# Patient Record
Sex: MALE | Race: WHITE | NOT HISPANIC OR LATINO | Employment: UNEMPLOYED | URBAN - METROPOLITAN AREA
[De-identification: names, ages, dates, MRNs, and addresses within clinical notes are randomized per-mention and may not be internally consistent; named-entity substitution may affect disease eponyms.]

---

## 2017-02-01 ENCOUNTER — ALLSCRIPTS OFFICE VISIT (OUTPATIENT)
Dept: OTHER | Facility: OTHER | Age: 22
End: 2017-02-01

## 2017-04-03 DIAGNOSIS — E78.1 PURE HYPERGLYCERIDEMIA: ICD-10-CM

## 2017-04-03 DIAGNOSIS — F20.81 SCHIZOPHRENIFORM DISORDER (HCC): ICD-10-CM

## 2017-04-03 DIAGNOSIS — R53.83 OTHER FATIGUE: ICD-10-CM

## 2017-04-03 DIAGNOSIS — F84.0 AUTISTIC DISORDER: ICD-10-CM

## 2017-06-29 ENCOUNTER — ALLSCRIPTS OFFICE VISIT (OUTPATIENT)
Dept: OTHER | Facility: OTHER | Age: 22
End: 2017-06-29

## 2017-09-06 ENCOUNTER — ALLSCRIPTS OFFICE VISIT (OUTPATIENT)
Dept: OTHER | Facility: OTHER | Age: 22
End: 2017-09-06

## 2017-10-08 ENCOUNTER — OFFICE VISIT (OUTPATIENT)
Dept: URGENT CARE | Facility: CLINIC | Age: 22
End: 2017-10-08
Payer: COMMERCIAL

## 2017-10-08 PROCEDURE — G0382 LEV 3 HOSP TYPE B ED VISIT: HCPCS

## 2017-10-09 NOTE — PROGRESS NOTES
Assessment  1  Rhinosinusitis (473 9) (J32 9)   2  Bronchitis (490) (J40)    Plan  Bronchitis    · Amoxicillin-Pot Clavulanate 875-125 MG Oral Tablet; TAKE 1 TABLET EVERY 12  HOURS DAILY    Discussion/Summary  Discussion Summary:   to motheer and child  Medication Side Effects Reviewed: Possible side effects of new medications were reviewed with the patient/guardian today  Understands and agrees with treatment plan: The treatment plan was reviewed with the patient/guardian  The patient/guardian understands and agrees with the treatment plan   Counseling Documentation With Imm: The patient, patient's family was counseled regarding  Chief Complaint  1  Cough  Chief Complaint Free Text Note Form: Pt c/o sinus pressure, productive cough and a sore throat for two weeks  History of Present Illness  HPI: young adult here with mother  sinus drainage and pressure x several days  now with sore throat, yellow grey green drainage nose and sputum  -fever or chills  [facility] Practices Required Assessment:   Pain Assessment   the patient states they have pain  The pain is located in the sinus  The patient describes the pain as dull  (on a scale of 0 to 10, the patient rates the pain at 3 )   Abuse And Domestic Violence Screen    Yes, the patient is safe at home -The patient states no one is hurting them  Depression And Suicide Screen  No, the patient has not had thoughts of hurting themself  No, the patient has not felt depressed in the past 7 days  Prefered Language is  [place]  Primary Language is  English  Readiness To Learn: Receptive  Barriers To Learning: none  Education Completed: disease/condition   Teaching Method: verbal   Person Taught: patient   Evaluation Of Learning: verbalized/demonstrated understanding      Review of Systems  Focused-Male:   Constitutional: no fever or chills, feels well, no tiredness, no recent weight loss or weight gain  ENT: as noted in HPI  Cardiovascular: no complaints of slow or fast heart rate, no chest pain, no palpitations, no leg claudication or lower extremity edema  Respiratory: no complaints of shortness of breath, no wheezing or cough, no dyspnea on exertion, no orthopnea or PND  Gastrointestinal: no complaints of abdominal pain, no constipation, no nausea or vomiting, no diarrhea or bloody stools  Musculoskeletal: no complaints of arthralgia, no myalgia, no joint swelling or stiffness, no limb pain or swelling  Integumentary: no complaints of skin rash or lesion, no itching or dry skin, no skin wounds  ROS Reviewed:   ROS reviewed  Active Problems  1  Autistic disorder, active (299 00) (F84 0)   2  Depression (311) (F32 9)   3  Encounter for PPD test (V74 1) (Z11 1)   4  Essential hypertriglyceridemia (272 1) (E78 1)   5  Fatigue (780 79) (R53 83)   6  Hyperlipidemia (272 4) (E78 5)   7  Left-sided Bell's palsy (351 0) (G51 0)   8  Mood disorder (296 90) (F39)   9  Need for hepatitis B vaccination (V05 3) (Z23)   10  Need for influenza vaccination (V04 81) (Z23)   11  Need for Menactra vaccination (V03 89) (Z23)   12  Need for MMR vaccine (V06 4) (Z23)   13  Obese (278 00) (E66 9)   14  Otitis externa, left (380 10) (H60 92)   15  PDD (pervasive developmental disorder) (299 90) (F84 9)   16  Plantar warts (078 12) (B07 0)   17  Schizophreniform disorder, 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) (F20 81)   18  Sleep apnea (780 57) (G47 30)   19  Vitamin D deficiency (268 9) (E55 9)    Past Medical History  1  Acute upper respiratory infection (465 9) (J06 9)   2  History of Mononucleosis (075) (B27 90)  Active Problems And Past Medical History Reviewed: The active problems and past medical history were reviewed and updated today  Family History  Father    1  No pertinent family history  Family History    2  Family history of Cancer   3  Family history of Diabetes Mellitus (V18 0)   4  Family history of Dyslipidemia   5   Family history of Stroke Syndrome (V17 1)  Family History Reviewed: The family history was reviewed and updated today  Social History   · Never A Smoker   · Never Drank Alcohol   · Uses Safety Equipment - Seatbelts  Social History Reviewed: The social history was reviewed and updated today  Surgical History  1  History of Tonsillectomy With Adenoidectomy  Surgical History Reviewed: The surgical history was reviewed and updated today  Current Meds   1  ClomiPRAMINE HCl - 50 MG Oral Capsule; TAKE 2 CAPSULE Bedtime; Therapy: (Recorded:25Nov2013) to Recorded   2  CloZAPine 100 MG Oral Tablet; TAKE 2 TABLETS AT BEDTIME; Therapy: [id number] to Recorded   3  CloZAPine 25 MG Oral Tablet; Take 1 tablet in AM and 2 tablets at 4PM;   Therapy: (Recorded:52Hev3400) to Recorded   4  Fluticasone Propionate 50 MCG/ACT Nasal Suspension; USE 2 SPRAYS IN EACH   NOSTRIL ONCE DAILY; Therapy: 32ORN5803 to (Last Rx:12Jan2016)  Requested for: 12Jan2016 Ordered   5  Triamcinolone Acetonide 0 1 % External Cream; Apply sparingly to affected area 2-3   times daily; Therapy: 99ICP5470 to (Last Rx:25Jan2016)  Requested for: 25Jan2016 Ordered    Allergies  1  predniSONE    Vitals  Signs   Recorded: 14DDP1330 09:59AM   Temperature: 97 2 F  Heart Rate: 105  Respiration: 20  Systolic: 986  Diastolic: 76  Height: 5 ft 3 in  Weight: 211 lb 13 78 oz  BMI Calculated: 37 53  BSA Calculated: 1 98  O2 Saturation: 95    Physical Exam    Constitutional   General appearance: No acute distress, well appearing and well nourished  Eyes   Conjunctiva and lids: No swelling, erythema, or discharge  Pupils and irises: Equal, round and reactive to light  Ears, Nose, Mouth, and Throat   External inspection of ears and nose: Normal  -mild ceruminosis  Nasal mucosa, septum, and turbinates: Abnormal  -yellow crusting internally, profuse post nasal drip, pharynx redded without exudate     Oropharynx: Abnormal     Pulmonary   Auscultation of lungs: Abnormal  -bilat insp ronchi with wheeze scattered  Cardiovascular   Auscultation of heart: Normal rate and rhythm, normal S1 and S2, without murmurs  Skin   Skin and subcutaneous tissue: Normal without rashes or lesions      Psychiatric   Orientation to person, place and time: Normal        Signatures   Electronically signed by : Uma Andrade DO; Oct  8 2017 10:30AM EST                       (Author)

## 2017-12-28 ENCOUNTER — ALLSCRIPTS OFFICE VISIT (OUTPATIENT)
Dept: OTHER | Facility: OTHER | Age: 22
End: 2017-12-28

## 2018-01-03 ENCOUNTER — GENERIC CONVERSION - ENCOUNTER (OUTPATIENT)
Dept: FAMILY MEDICINE CLINIC | Facility: CLINIC | Age: 23
End: 2018-01-03

## 2018-01-04 NOTE — PROGRESS NOTES
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) (F20 81)   5  PDD (pervasive developmental disorder) (299 90) (F84 9)   6  Tachycardia (785 0) (R00 0)    Plan   Fatigue, Tachycardia    · ECG 12-LEAD; Status:Active; Requested for:35Gwr8600;     Discussion/Summary      Trial OTC hydrocortisone for the eczema, f/u after testing  The patient, patient's family was counseled regarding instructions for management,-- patient and family education,-- impressions  The treatment plan was reviewed with the patient/guardian  The patient/guardian understands and agrees with the treatment plan      Chief Complaint   Pt is here for a check up  History of Present Illness   here for scheduled OV with his mom requests recommendation for nutritionist to help him with diet and controlling cholesterol he likes fast food- states just had chol testing with labs from specialist couple of weeks ago      Review of Systems        Constitutional: No fever or chills, feels well, no tiredness, no recent weight gain or weight loss  Eyes: No complaints of eye pain, no red eyes, no discharge from eyes, no itchy eyes  Cardiovascular: No complaints of slow heart rate, no fast heart rate, no chest pain, no palpitations, no leg claudication, no lower extremity  Respiratory: No complaints of shortness of breath, no wheezing, no cough, no SOB on exertion, no orthopnea or PND  Musculoskeletal: No complaints of arthralgia, no myalgias, no joint swelling or stiffness, no limb pain or swelling  Integumentary: dry skin  Neurological: No compliants of headache, no confusion, no convulsions, no numbness or tingling, no dizziness or fainting, no limb weakness, no difficulty walking        Endocrine: No complaints of proptosis, no hot flashes, no muscle weakness, no erectile dysfunction, no deepening of the voice, no feelings of weakness  Hematologic/Lymphatic: No complaints of swollen glands, no swollen glands in the neck, does not bleed easily, no easy bruising  Active Problems   1  Autistic disorder, active (299 00) (F84 0)   2  Depression (311) (F32 9)   3  Encounter for PPD test (V74 1) (Z11 1)   4  Essential hypertriglyceridemia (272 1) (E78 1)   5  Fatigue (780 79) (R53 83)   6  Hyperlipidemia (272 4) (E78 5)   7  Left-sided Bell's palsy (351 0) (G51 0)   8  Mood disorder (296 90) (F39)   9  Need for hepatitis B vaccination (V05 3) (Z23)   10  Need for influenza vaccination (V04 81) (Z23)   11  Need for Menactra vaccination (V03 89) (Z23)   12  Need for MMR vaccine (V06 4) (Z23)   13  Obese (278 00) (E66 9)   14  PDD (pervasive developmental disorder) (299 90) (F84 9)   15  Plantar warts (078 12) (B07 0)   16  Schizophreniform disorder, 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) (F20 81)   17  Sleep apnea (780 57) (G47 30)   18  Vitamin D deficiency (268 9) (E55 9)    Past Medical History   1  Acute upper respiratory infection (465 9) (J06 9)   2  History of bronchitis (V12 69) (Z87 09)   3  History of Mononucleosis (075) (B27 90)   4  History of Otitis externa, left (380 10) (H60 92)   5  History of Rhinosinusitis (473 9) (J32 9)     The active problems and past medical history were reviewed and updated today  Surgical History   1  History of Tonsillectomy With Adenoidectomy     The surgical history was reviewed and updated today  Family History   Father    1  No pertinent family history  Family History    2  Family history of Cancer   3  Family history of Diabetes Mellitus (V18 0)   4  Family history of Dyslipidemia   5  Family history of Stroke Syndrome (V17 1)     The family history was reviewed and updated today  Social History    · Never A Smoker   · Never Drank Alcohol   · Uses Safety Equipment - Seatbelts  The social history was reviewed and updated today   The social history was reviewed and is unchanged  Current Meds    1  ClomiPRAMINE HCl - 50 MG Oral Capsule; TAKE 2 CAPSULE Bedtime; Therapy: (Recorded:25Nov2013) to Recorded   2  CloZAPine 100 MG Oral Tablet; TAKE 2 TABLETS AT BEDTIME; Therapy: 04CPG1312 to Recorded   3  CloZAPine 25 MG Oral Tablet; Take 1 tablet in AM and 2 tablets at 4PM;     Therapy: (Recorded:02Cut2073) to Recorded   4  Fluticasone Propionate 50 MCG/ACT Nasal Suspension; USE 2 SPRAYS IN EACH     NOSTRIL ONCE DAILY; Therapy: 76ISM1821 to (Last Rx:12Jan2016)  Requested for: 12Jan2016 Ordered   5  Triamcinolone Acetonide 0 1 % External Cream; Apply sparingly to affected area 2-3     times daily; Therapy: 74BQO2325 to (Last Rx:25Jan2016)  Requested for: 25Jan2016 Ordered     The medication list was reviewed and updated today  Allergies   1  predniSONE    Vitals   Vital Signs    Recorded: 08Zdv7826 04:57PM   Temperature 99 F   Heart Rate 113   Respiration 17   Systolic 378   Diastolic 78   Height 5 ft 3 in   Weight 215 lb 6 oz   BMI Calculated 38 15   BSA Calculated 2   O2 Saturation 97     Physical Exam        Constitutional      General appearance: No acute distress, well appearing and well nourished  -- except BMI  Eyes      Conjunctiva and lids: No swelling, erythema, or discharge  Pupils and irises: Equal, round and reactive to light  Ears, Nose, Mouth, and Throat      Oropharynx: Normal with no erythema, edema, exudate or lesions  Pulmonary      Respiratory effort: No increased work of breathing or signs of respiratory distress  Auscultation of lungs: Clear to auscultation, equal breath sounds bilaterally, no wheezes, no rales, no rhonci  Cardiovascular      Auscultation of heart: Normal rate and rhythm, normal S1 and S2, without murmurs  Examination of extremities for edema and/or varicosities: Normal        Abdomen      Abdomen: Non-tender, no masses  -- except girth        Liver and spleen: No hepatomegaly or splenomegaly  Lymphatic      Palpation of lymph nodes in neck: No lymphadenopathy  Musculoskeletal      Gait and station: Normal        Skin      Skin and subcutaneous tissue: Abnormal   Clinical impression: eczema  Psychiatric      Mood and affect: Abnormal   Mood and Affect: appropriate mood-- and-- flat  Results/Data   PHQ-9 Adult Depression Screening 30Kta9130 05:41PM User, Janeth      Test Name Result Flag Reference   PHQ-9 Adult Depression Score 8     Over the last two weeks, how often have you been bothered by any of the following problems? Little interest or pleasure in doing things: More than half the days - 2     Feeling down, depressed, or hopeless: Several days - 1     Trouble falling or staying asleep, or sleeping too much: Not at all - 0     Feeling tired or having little energy: Several days - 1     Poor appetite or over eating: Several days - 1     Feeling bad about yourself - or that you are a failure or have let yourself or your family down: Several days - 1     Trouble concentrating on things, such as reading the newspaper or watching television: Several days - 1     Moving or speaking so slowly that other people could have noticed   Or the opposite -  being so fidgety or restless that you have been moving around a lot more than usual: Not at all - 0     Thoughts that you would be better off dead, or of hurting yourself in some way: Several days - 1   PHQ-9 Adult Depression Screening Negative     PHQ-9 Difficulty Level Not difficult at all     PHQ-9 Severity Mild Depression          Signatures    Electronically signed by : Mak Beauchamp DO; [place]  3 2018  4:29PM EST                       (Author)

## 2018-01-12 NOTE — PROGRESS NOTES
Assessment    1  Encounter for preventive health examination (V70 0) (Z00 00)   2  Autistic disorder, active (299 00) (F84 0)    Plan  Return PRN     Discussion/Summary  Impression: health maintenance visit, healthy adult male  This will need immunization records in order to complete the second form  Chief Complaint  Patient presents for well physical       History of Present Illness  HM, Adult Male: The patient is being seen for a health maintenance evaluation  Social History: Household members include mother  He is unmarried  Work status: currently on disability  The patient has never smoked cigarettes and has never used smokeless tobacco  He reports never drinking alcohol  He has never used illicit drugs  General Health: The patient's health since the last visit is described as good  He has regular dental visits  He denies vision problems  He denies hearing loss  Immunizations status: not up to date   No immunization records  Lifestyle:  He has weight concerns  He does not exercise regularly  He does not use tobacco  He denies alcohol use  He denies drug use  Reproductive health:  the patient is not sexually active  Screening:      Review of Systems    Constitutional: no fever, not feeling poorly, no chills and not feeling tired  ENT: no earache, no nosebleeds and no nasal discharge  Cardiovascular: fast heart rate  Respiratory: no shortness of breath and no cough  Gastrointestinal: no abdominal pain, no nausea and no diarrhea  Genitourinary: no dysuria  Musculoskeletal: no arthralgias and no myalgias  Integumentary: no rashes  Neurological: no headache and no dizziness  Psychiatric: depression  Active Problems    1  Autistic disorder, active (299 00) (F84 0)   2  Depression (311) (F32 9)   3  Encounter for PPD test (V74 1) (Z11 1)   4  Essential hypertriglyceridemia (272 1) (E78 1)   5  Fatigue (780 79) (R53 83)   6  Hyperlipidemia (272 4) (E78 5)   7   Left-sided Bell's palsy (351 0) (G51 0)   8  Mood disorder (296 90) (F39)   9  Need for hepatitis B vaccination (V05 3) (Z23)   10  Need for influenza vaccination (V04 81) (Z23)   11  Need for MMR vaccine (V06 4) (Z23)   12  Obese (278 00) (E66 9)   13  Otitis externa, left (380 10) (H60 92)   14  PDD (pervasive developmental disorder) (299 90) (F84 9)   15  Plantar warts (078 12) (B07 0)   16  Schizophreniform disorder, 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) (F20 81)   17  Sleep apnea (780 57) (G47 30)   18  Vitamin D deficiency (268 9) (E55 9)    Past Medical History    · Acute upper respiratory infection (465 9) (J06 9)   · History of Mononucleosis (075) (B27 90)    Surgical History    · History of Tonsillectomy With Adenoidectomy    Family History  Father    · No pertinent family history  Family History    · Family history of Cancer   · Family history of Diabetes Mellitus (V18 0)   · Family history of Dyslipidemia   · Family history of Stroke Syndrome (V17 1)    Social History    · Never A Smoker   · Never Drank Alcohol   · Uses Safety Equipment - Seatbelts    Current Meds   1  ClomiPRAMINE HCl - 50 MG Oral Capsule; TAKE 2 CAPSULE Bedtime; Therapy: (Recorded:25Nov2013) to Recorded   2  CloZAPine 100 MG Oral Tablet; TAKE 2 TABLETS AT BEDTIME; Therapy: 29NUX6280 to Recorded   3  CloZAPine 25 MG Oral Tablet; Take 1 tablet in AM and 2 tablets at 4PM;   Therapy: (Recorded:66Eyd7988) to Recorded   4  Fluticasone Propionate 50 MCG/ACT Nasal Suspension; USE 2 SPRAYS IN EACH   NOSTRIL ONCE DAILY; Therapy: 75DXN0900 to (Last Rx:12Jan2016)  Requested for: 12Jan2016 Ordered   5  Triamcinolone Acetonide 0 1 % External Cream; Apply sparingly to affected area 2-3   times daily;    Therapy: 77PFV6280 to (Last Rx:25Jan2016)  Requested for: 25Jan2016 Ordered    Allergies    1  predniSONE    Vitals   Recorded: 84QGP3493 03:44PM   Temperature 98 9 F   Heart Rate 728   Systolic 716   Diastolic 88   Height 5 ft 3 in   Weight 213 lb    BMI Calculated 37 73   BSA Calculated 1 99   O2 Saturation 95     Physical Exam    Constitutional   General appearance: No acute distress, well appearing and well nourished  Head and Face   Head and face: Normal     Palpation of the face and sinuses: No sinus tenderness  Eyes   Conjunctiva and lids: No erythema, swelling or discharge  Pupils and irises: Equal, round, reactive to light  Ears, Nose, Mouth, and Throat   External inspection of ears and nose: Normal     Otoscopic examination: Abnormal   The right tympanic membrane was obscured  The left tympanic membrane was obscured  The right external canal was normal  The left external canal was normal    Hearing: Normal     Nasal mucosa, septum, and turbinates: Normal without edema or erythema  Lips, teeth, and gums: Normal, good dentition  Oropharynx: Normal with no erythema, edema, exudate or lesions  Neck   Neck: Supple, symmetric, trachea midline, no masses  Thyroid: Normal, no thyromegaly  Pulmonary   Auscultation of lungs: Clear to auscultation  Cardiovascular   Auscultation of heart: Normal rate and rhythm, normal S1 and S2, no murmurs  Carotid pulses: 2+ bilaterally  Abdomen   Abdomen: Abnormal   The abdomen was obese  Bowel sounds were normal  The abdomen was soft and nontender  No rebound tenderness  No guarding  There was a negative Blake's sign  no masses palpated  The abdomen was normal to percussion  no CVA tenderness   Liver and spleen: No hepatomegaly or splenomegaly  Lymphatic   Palpation of lymph nodes in neck: No lymphadenopathy  Musculoskeletal   Gait and station: Normal     Inspection/palpation of digits and nails: Normal without clubbing or cyanosis  Inspection/palpation of joints, bones, and muscles: Normal     Range of motion: Normal     Stability: Normal     Muscle strength/tone: Normal     Skin   Skin and subcutaneous tissue: Normal without rashes or lesions      Psychiatric   Mood and affect: Normal   Mood and Affect: bewildered and quiet  Procedure    Procedure: Indication: routine screening  Inforrmation supplied by a Snellen chart  Results: 20/25 in both eyes without corrective device, 20/25 in the right eye without corrective device, 20/25 in the left eye without corrective device normal in both eyes        Signatures   Electronically signed by : Mali Rodriguez DO; Sep  6 2017  4:39PM EST                       (Author)

## 2018-01-13 VITALS
OXYGEN SATURATION: 95 % | TEMPERATURE: 98.9 F | HEIGHT: 63 IN | WEIGHT: 213 LBS | BODY MASS INDEX: 37.74 KG/M2 | DIASTOLIC BLOOD PRESSURE: 88 MMHG | HEART RATE: 123 BPM | SYSTOLIC BLOOD PRESSURE: 124 MMHG

## 2018-01-14 VITALS
DIASTOLIC BLOOD PRESSURE: 64 MMHG | WEIGHT: 210.25 LBS | HEIGHT: 62 IN | RESPIRATION RATE: 18 BRPM | TEMPERATURE: 98.9 F | OXYGEN SATURATION: 97 % | BODY MASS INDEX: 38.69 KG/M2 | HEART RATE: 124 BPM | SYSTOLIC BLOOD PRESSURE: 120 MMHG

## 2018-01-15 ENCOUNTER — APPOINTMENT (OUTPATIENT)
Dept: NUTRITION | Facility: HOSPITAL | Age: 23
End: 2018-01-15
Payer: COMMERCIAL

## 2018-01-15 VITALS
DIASTOLIC BLOOD PRESSURE: 84 MMHG | WEIGHT: 214 LBS | TEMPERATURE: 98.8 F | BODY MASS INDEX: 39.38 KG/M2 | HEART RATE: 121 BPM | HEIGHT: 62 IN | OXYGEN SATURATION: 96 % | SYSTOLIC BLOOD PRESSURE: 116 MMHG

## 2018-01-15 DIAGNOSIS — E78.1 HYPERTRIGLYCERIDEMIA, ESSENTIAL: ICD-10-CM

## 2018-01-15 PROCEDURE — 97802 MEDICAL NUTRITION INDIV IN: CPT

## 2018-01-17 NOTE — PROGRESS NOTES
Assessment    1  Latex allergy, contact dermatitis (692 4) (L25 3)    Plan  Latex allergy, contact dermatitis    · Triamcinolone Acetonide 0 1 % External Cream; Apply sparingly to affected area  2-3 times daily   · Follow-up visit in 1 week Evaluation and Treatment  Follow-up  Status: Hold For -  Scheduling  Requested for: 25Jan2016   · The following may help your itching and prevent it from returning ; Status:Complete;    Done: [id number]   · Call [phone] if: You have signs of infection in or around the affected area ;  Status:Complete;   Done: [id number]     Return when necessary     Discussion/Summary    The patient's employer has changed gloves for this patient but the patient doesn't know if there are nonlatex or not  His mother will make sure that the employer knows that he is allergic to latex  Chief Complaint    1  Rash  Patient has a rash on bilateral hands  Mom states that he must wear gloves at work  She says that they just switched his brand of gloves, they tried putting on antibiotic ointment and they have tried lotion  The rash started several weeks ago  And everything they have tried has made it better but nothing takes it away  History of Present Illness  HPI: The patient developed a rash on the dorsum of both hands about 3 months ago  He has been putting Neosporin on it without any relief however occasionally it resolves on its own and then recurs  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  Rash:   MARIAMA GAUTHIER presents with complaints of gradual onset of intermittent episodes of moderate bilateral hand rash  Episodes started about 3 months ago  His symptoms are probably caused by direct skin contact  Symptoms are made worse by constant allergen contact and itch-scratch cycle  Symptoms are unchanged     Associated symptoms include cracking, crusting, pruritus, skin redness and skin scaling, but no skin blistering, no skin bumps, no draining, no skin dryness, no pain, no skin swelling and no excoriations  Review of Systems    Constitutional: no fever and no chills  Cardiovascular: no chest pain and no palpitations  Respiratory: no cough, no wheezing and no shortness of breath during exertion  Gastrointestinal: no abdominal pain and no diarrhea  Musculoskeletal: no arthralgias and no myalgias  Integumentary: a rash and itching, but as noted in HPI  Neurological: no headache and no dizziness  Active Problems    1  Asthmatic bronchitis (493 90) (J45 909)   2  Autistic disorder (299 00) (F84 0)   3  Bronchitis, acute (466 0) (J20 9)   4  Cough (786 2) (R05)   5  Depression (311) (F32 9)   6  Encounter for PPD test (V74 1) (Z11 1)   7  Fatigue (780 79) (R53 83)   8  Flu vaccine need (V04 81) (Z23)   9  Hyperlipidemia (272 4) (E78 5)   10  Left-sided Bell's palsy (351 0) (G51 0)   11  Otitis externa, left (380 10) (H60 92)   12  Plantar warts (078 12) (B07 0)   13  Routine child health exam (V20 2) (Z00 129)   14  Sleep apnea (780 57) (G47 30)   15  Urinary tract infection (599 0) (N39 0)   16  Vitamin D deficiency (268 9) (E55 9)    Past Medical History    1  Acute upper respiratory infection (465 9) (J06 9)   2  History of Mononucleosis (075) (B27 90)  Active Problems And Past Medical History Reviewed: The active problems and past medical history were reviewed and updated today  Family History    1  No pertinent family history    2  Family history of Cancer   3  Family history of Diabetes Mellitus (V18 0)   4  Family history of Dyslipidemia   5  Family history of Stroke Syndrome (V17 1)  Family History Reviewed: The family history was reviewed and updated today  Social History    · Never A Smoker   · Never Drank Alcohol   · Uses Safety Equipment - Seatbelts  The social history was reviewed and updated today  The social history was reviewed and is unchanged  Surgical History    1   History of Tonsillectomy With Adenoidectomy  Surgical History Reviewed: The surgical history was reviewed and updated today  Current Meds   1  ClomiPRAMINE HCl - 50 MG Oral Capsule; TAKE 2 CAPSULE Bedtime; Therapy: (Recorded:25Nov2013) to Recorded   2  CloZAPine 100 MG Oral Tablet; TAKE 2 TABLETS AT BEDTIME; Therapy: 09KIR2428 to Recorded   3  CloZAPine 25 MG Oral Tablet; Take 1 tablet twice daily; Therapy: (Darrol Abu) to Recorded   4  Fluticasone Propionate 50 MCG/ACT Nasal Suspension; USE 2 SPRAYS IN EACH   NOSTRIL ONCE DAILY; Therapy: 22HXP7813 to (Last Rx:12Jan2016)  Requested for: 12Jan2016 Ordered    The medication list was reviewed and updated today  Allergies    1  predniSONE    Vitals   Recorded: 38XGC5723 04:15PM   Temperature 98 3 F   Heart Rate 193   Systolic 448   Diastolic 92   Height 5 ft 2 5 in   Weight 217 lb 4 00 oz   BMI Calculated 39 1   BSA Calculated 2   O2 Saturation 96     Physical Exam    Constitutional   General appearance: No acute distress, well appearing and well nourished  Pulmonary   Auscultation of lungs: Clear to auscultation, equal breath sounds bilaterally, no wheezes, no rales, no rhonci  Skin   Skin and subcutaneous tissue: Abnormal   With erythematous confluent rash on the dorsum of both hands worse on the right          Signatures   Electronically signed by : Noé Bautista DO; Jan 25 2016  7:33PM EST                       (Author)

## 2018-01-17 NOTE — PROGRESS NOTES
Assessment    1  Encounter for preventive health examination (V70 0) (Z00 00)   2  Obese (278 00) (E66 9)    Plan   Health Maintenance    · (Q) HEPATITIS B IMMUNITY PANEL; Status:Active; Requested for:13Btx7357;     [address]; Status:Active - Perform Order; Requested for:30Lsw0288;   Due:12Amr8149;Ordered; Today;    For:Health Maintenance; Ordered By:Janette Duncan;   SCREEN AUDIOGRAM- POC; Status:Active - Perform Order; Requested for:25Ccw8342;   Due:46Jzt5492;Ordered; Today;    For:Health Maintenance; Ordered By:Janette Duncan;      Discussion/Summary  Impression: health maintenance visit  Prostate cancer screening: PSA is not indicated  Testicular cancer screening: the risks and benefits of testicular cancer screening were discussed, monthly self testicular exam was advised and the patient declines testicular cancer screening  Patient discussion: discussed with the patient, discussed with the patient's family  Pt deferred  exam today, though I did review pt ed re: testicular cancer screening  Chief Complaint  Pt is here for a physical for [facility] [employer] services  History of Present Illness  HM, Adult Male: The patient is being seen for a health maintenance evaluation  General Health:   Screening:      Review of Systems    Constitutional: No fever or chills, feels well, no tiredness, no recent weight gain or weight loss  Eyes: No complaints of eye pain, no red eyes, no discharge from eyes, no itchy eyes  ENT: no complaints of earache, no hearing loss, no nosebleeds, no nasal discharge, no sore throat, no hoarseness  Cardiovascular: No complaints of slow heart rate, no fast heart rate, no chest pain, no palpitations, no leg claudication, no lower extremity  Respiratory: No complaints of shortness of breath, no wheezing, no cough, no SOB on exertion, no orthopnea or PND     Gastrointestinal: No complaints of abdominal pain, no constipation, no nausea or vomiting, no diarrhea or bloody stools  Genitourinary: No complaints of dysuria, no incontinence, no hesitancy, no nocturia, no genital lesion, no testicular pain  Musculoskeletal: No complaints of arthralgia, no myalgias, no joint swelling or stiffness, no limb pain or swelling  Integumentary: No complaints of skin rash or skin lesions, no itching, no skin wound, no dry skin  Neurological: No compliants of headache, no confusion, no convulsions, no numbness or tingling, no dizziness or fainting, no limb weakness, no difficulty walking  Psychiatric: Is not suicidal, no sleep disturbances, no anxiety or depression, no change in personality, no emotional problems  Endocrine: No complaints of proptosis, no hot flashes, no muscle weakness, no erectile dysfunction, no deepening of the voice, no feelings of weakness  Hematologic/Lymphatic: No complaints of swollen glands, no swollen glands in the neck, does not bleed easily, no easy bruising  Active Problems    1  Autistic disorder (299 00) (F84 0)   2  Depression (311) (F32 9)   3  Fatigue (780 79) (R53 83)   4  Hyperlipidemia (272 4) (E78 5)   5  Left-sided Bell's palsy (351 0) (G51 0)   6  Otitis externa, left (380 10) (H60 92)   7  Plantar warts (078 12) (B07 0)   8  Sleep apnea (780 57) (G47 30)   9  Vitamin D deficiency (268 9) (E55 9)    Past Medical History    · Acute upper respiratory infection (465 9) (J06 9)   · History of Mononucleosis (075) (B27 90)    Surgical History    · History of Tonsillectomy With Adenoidectomy    Family History  Father    · No pertinent family history  Family History    · Family history of Cancer   · Family history of Diabetes Mellitus (V18 0)   · Family history of Dyslipidemia   · Family history of Stroke Syndrome (V17 1)    Social History    · Never A Smoker   · Never Drank Alcohol   · Uses Safety Equipment - Seatbelts    Current Meds   1  ClomiPRAMINE HCl - 50 MG Oral Capsule; TAKE 2 CAPSULE Bedtime;    Therapy: (Betina Clutter) to Recorded   2  CloZAPine 100 MG Oral Tablet; TAKE 2 TABLETS AT BEDTIME; Therapy: 05WFM6360 to Recorded   3  CloZAPine 25 MG Oral Tablet; Take 1 tablet twice daily; Therapy: (Betina Clutter) to Recorded   4  Fluticasone Propionate 50 MCG/ACT Nasal Suspension; USE 2 SPRAYS IN EACH   NOSTRIL ONCE DAILY; Therapy: 41QHQ9435 to (Last Rx:12Jan2016)  Requested for: 12Jan2016 Ordered   5  Triamcinolone Acetonide 0 1 % External Cream; Apply sparingly to affected area 2-3   times daily; Therapy: 85STX3021 to (Last Rx:25Jan2016)  Requested for: 25Jan2016 Ordered    Allergies    1  predniSONE    Physical Exam    Constitutional   General appearance: Abnormal   appears healthy, obese, not hirsute, rested, not exhausted, well hydrated and appearance reflects stated age  Head and Face   Head and face: Normal     Eyes   Conjunctiva and lids: No erythema, swelling or discharge  Pupils and irises: Equal, round, reactive to light  Ears, Nose, Mouth, and Throat   External inspection of ears and nose: Normal     Otoscopic examination: Tympanic membranes translucent with normal light reflex  Canals patent without erythema  Hearing: Normal     Lips, teeth, and gums: Normal, good dentition  Oropharynx: Normal with no erythema, edema, exudate or lesions  Neck   Neck: Supple, symmetric, trachea midline, no masses  Thyroid: Normal, no thyromegaly  Pulmonary   Respiratory effort: No increased work of breathing or signs of respiratory distress  Percussion of chest: Normal     Auscultation of lungs: Clear to auscultation  Cardiovascular   Auscultation of heart: Normal rate and rhythm, normal S1 and S2, no murmurs  Carotid pulses: 2+ bilaterally  Abdominal aorta: Normal     Pedal pulses: 2+ bilaterally  Peripheral vascular exam: Normal     Examination of extremities for edema and/or varicosities: Normal     Chest   Breasts: Normal, no dimpling or skin changes appreciated  Chest: Normal     Abdomen   Abdomen: Non-tender, no masses  Liver and spleen: No hepatomegaly or splenomegaly  Examination for hernias: No hernias appreciated  No ventral hernia was palpated  No umbilical hernia was palpated  Lymphatic   Palpation of lymph nodes in neck: No lymphadenopathy  Palpation of lymph nodes in axillae: No lymphadenopathy  Palpation of lymph nodes in groin: No lymphadenopathy  Palpation of lymph nodes in other areas: No lymphadenopathy  Musculoskeletal   Gait and station: Normal     Inspection/palpation of digits and nails: Normal without clubbing or cyanosis  Skin   Skin and subcutaneous tissue: Normal without rashes or lesions  Palpation of skin and subcutaneous tissue: Normal turgor  Neurologic   Cranial nerves: Cranial nerves 2-12 intact  Cortical function: Normal mental status  Reflexes: 2+ and symmetric  Sensation: No sensory loss  Psychiatric   Judgment and insight: Normal     Orientation to person, place and time: Normal     Recent and remote memory: Intact  Mood and affect: Normal        Results/Data  PHQ-9 Adult Depression Screening 90Uqg4343 03:29PM User, BooRahs     Test Name Result Flag Reference   PHQ-9 Adult Depression Score 15     Over the last two weeks, how often have you been bothered by any of the following problems?   Little interest or pleasure in doing things: More than half the days - 2  Feeling down, depressed, or hopeless: More than half the days - 2  Trouble falling or staying asleep, or sleeping too much: Not at all - 0  Feeling tired or having little energy: Nearly every day - 3  Poor appetite or over eating: More than half the days - 2  Feeling bad about yourself - or that you are a failure or have let yourself or your family down: More than half the days - 2  Trouble concentrating on things, such as reading the newspaper or watching television: Not at all - 0  Moving or speaking so slowly that other people could have noticed  Or the opposite -  being so fidgety or restless that you have been moving around a lot more than usual: More than half the days - 2  Thoughts that you would be better off dead, or of hurting yourself in some way: More than half the days - 2   PHQ-9 Adult Depression Screening Positive     PHQ-9 Difficulty Level Not difficult at all     PHQ-9 Severity      Moderately Severe Depression       Procedure    Procedure: Hearing Acuity Test    Indication: Routine screeing  Audiometry: Normal bilaterally  Hearing in the right ear: 25 decibals at 500 hertz, 25 decibals at 1000 hertz, 25 decibals at 2000 hertz and 25 decibals at 4000 hertz  Hearing in the left ear: 25 decibals at 500 hertz, 25 decibals at 1000 hertz, 25 decibals at 2000 hertz and 25 decibals at 4000 hertz  Procedure: Visual Acuity Test    Indication: routine screening  Inforrmation supplied by att a Snellen chart  Results: 20/20 in the right eye without corrective device, 20/20 in the left eye without corrective device normal in both eyes        Future Appointments    Date/Time Provider Specialty Site   10/06/2016 04:30 PM [place], Nurse Schedule  [email or web address]     Signatures   Electronically signed by : Garry Ace DO; Oct  5 2016  6:54PM EST                       (Author)

## 2018-01-22 VITALS
OXYGEN SATURATION: 97 % | RESPIRATION RATE: 17 BRPM | BODY MASS INDEX: 38.16 KG/M2 | SYSTOLIC BLOOD PRESSURE: 125 MMHG | HEART RATE: 113 BPM | TEMPERATURE: 99 F | HEIGHT: 63 IN | DIASTOLIC BLOOD PRESSURE: 78 MMHG | WEIGHT: 215.38 LBS

## 2018-01-29 ENCOUNTER — CLINICAL SUPPORT (OUTPATIENT)
Dept: NUTRITION | Facility: HOSPITAL | Age: 23
End: 2018-01-29
Payer: COMMERCIAL

## 2018-01-29 VITALS — BODY MASS INDEX: 37.83 KG/M2 | HEIGHT: 63 IN | WEIGHT: 213.5 LBS

## 2018-01-29 DIAGNOSIS — E78.1 PURE HYPERGLYCERIDEMIA: ICD-10-CM

## 2018-01-29 DIAGNOSIS — E78.5 HYPERLIPIDEMIA, UNSPECIFIED HYPERLIPIDEMIA TYPE: ICD-10-CM

## 2018-01-29 PROCEDURE — 97803 MED NUTRITION INDIV SUBSEQ: CPT

## 2018-01-29 NOTE — PROGRESS NOTES
Follow-Up Nutrition Assessment Form    Patient Name: Hedy Pruett    YOB: 1995    Sex: Male      Follow Up Date: 1/29/2018  Start Time: 4:00pm Stop Time: 4:30pm Total Minutes: 30min     Data:  Present at session: Self and mother Darlene   Patient Concerns: Oliver Kaye states he wants to "get healthy to feel better"   Medical Dx/Reason for Referral: E78 1 Hypertriglyceridemia, E78 5 Hyperlipidemia   No past medical history on file  No current outpatient prescriptions on file  No current facility-administered medications for this visit  Additional Meds/Supplements: Vit D3   Barriers to Learning: Learning Disability   Labs: 1/4/18   Trig 431,HDL 27    Height: Ht Readings from Last 3 Encounters:   01/29/18 5' 3" (1 6 m)   12/28/17 5' 3" (1 6 m)   09/06/17 5' 3" (1 6 m)      Weight: Wt Readings from Last 3 Encounters:   01/29/18 96 8 kg (213 lb 8 oz)   12/28/17 97 7 kg (215 lb 6 oz)   09/06/17 96 6 kg (213 lb)      Wt  Change Since Last Visit: [x]Yes     []No  Amount: -2# in 2 weeks, desireable      Energy Needs: No calculations performed for this visit   Pain Screen: Are you having pain now? No      Goals Achieved: 1  Decreased consumption of Gatorade from 8-10 bottles a day to 1 bottle daily   2  Achieved a 2 lb weight loss in 2 weeks   3  States following smaller portion sizes of starches at meals and desserts after dinner, consuming broccoli  Initial PES: Altered nutrition related lab values related to food and nutrition knowledge deficit as evidenced by triglycerides, HDL and food recall      New PES: No Change      New Problem List:  1  Acheive 2 lb weight loss per week until; goal weight BMI 25  2 Consume 2-3 Cups Vegetables and 2 serving fruit daily   3  Achieve Trig levels wnl in 6-12 months and increase HDL        Assessment:  Oliver Kaye presents today with his Mom Alvarez Bailey for follow up and describes avoiding high intake of sports drinks, smaller portions of all foods especially desserts and initiated salad and broccoli into his dinners  Darlene states Cortney Lozada was accepted to a School for 2 months where he will have choices for all meals and snacks  Discussion focused on   my plate ,fist size portions of starches for meals and primary hydration source as water  Continue with 2# weight loss and increased physical activity by walking  Reviewed health benefits of healthy eating and weight loss for hypertriglyceridemia and hyperlipidemia  Support and encouragement provided       Medical Nutrition Therapy Intervention:  [x]Individualized Meal Plan-myplate [x]Understanding Lab Values   [x]Basic Pathophysiology of Disease []Food/Medication Interactions   [x]Food Diary-encouraged to check out myfitnesspal []Exercise   [x]Lifestyle/Behavior Modification Techniques []Medication, Mechanism of Action   [x]Label Reading-serv size/fiber/protein []Self Blood Glucose Monitoring   [x]Weight/BMI Goals [x]Other -  Provided 150 calorie high protein snack list   Other Notes:        Comprehension: []Excellent  []Very Good  [x]Good  []Fair   []Poor    Receptivity: []Excellent  []Very Good  [x]Good  []Fair   []Poor    Expected Compliance: []Excellent  []Very Good  [x]Good  []Fair   []Poor      Labs:  CMP  No results found for: NA, K, CL, CO2, ANIONGAP, BUN, CREATININE, GLUCOSE, GLUF, CALCIUM, CORRECTEDCA, AST, ALT, ALKPHOS, PROT, ALBUMIN, BILITOT, EGFR    BMP  No results found for: GLUCOSE, CALCIUM, NA, K, CO2, CL, BUN, CREATININE    Lipids  No results found for: CHOL  No results found for: HDL  No results found for: LDLCALC  No results found for: TRIG  No components found for: CHOLHDL    Hemoglobin A1C  No results found for: HGBA1C    Fasting Glucose  No results found for: GLUF    Insulin     Thyroid  No results found for: TSH, X9HELDQ, E7AINPC, THYROIDAB    Hepatic Function Panel  No results found for: ALT, AST, GGT, ALKPHOS, BILITOT    Celiac Disease Antibody Panel  No results found for: ENDOMYSIAL IGA, GLIADIN IGA, GLIADIN IGG, IGA, TISSUE TRANSGLUT AB, TTG IGA   Iron  No results found for: IRON, TIBC, FERRITIN    Vitamins  No results found for: VITAMIN B2   No results found for: NICOTINAMIDE, NICOTINIC ACID   No results found for: VITAMINB6  No results found for: DDQMPJIQ46  No results found for: VITB5  No results found for: D5IDEBUP  No results found for: THYROGLB  No results found for: VITAMIN K   No results found for: 25-HYDROXY VIT D   No results found for: VITAMINE     Mother will call for follow up appointment after Nieves Lorin returns from school program in April 2018 [address]  [address] [facility] [id number]

## 2018-09-27 ENCOUNTER — OFFICE VISIT (OUTPATIENT)
Dept: URGENT CARE | Facility: CLINIC | Age: 23
End: 2018-09-27
Payer: COMMERCIAL

## 2018-09-27 VITALS
SYSTOLIC BLOOD PRESSURE: 122 MMHG | WEIGHT: 213 LBS | RESPIRATION RATE: 16 BRPM | OXYGEN SATURATION: 96 % | BODY MASS INDEX: 37.74 KG/M2 | DIASTOLIC BLOOD PRESSURE: 78 MMHG | TEMPERATURE: 97.8 F | HEART RATE: 118 BPM | HEIGHT: 63 IN

## 2018-09-27 DIAGNOSIS — J01.40 ACUTE PANSINUSITIS, RECURRENCE NOT SPECIFIED: Primary | ICD-10-CM

## 2018-09-27 PROCEDURE — G0382 LEV 3 HOSP TYPE B ED VISIT: HCPCS | Performed by: NURSE PRACTITIONER

## 2018-09-27 RX ORDER — CLOMIPRAMINE HYDROCHLORIDE 50 MG/1
2 CAPSULE ORAL
COMMUNITY

## 2018-09-27 RX ORDER — AMOXICILLIN AND CLAVULANATE POTASSIUM 875; 125 MG/1; MG/1
1 TABLET, FILM COATED ORAL 2 TIMES DAILY
Qty: 14 TABLET | Refills: 0 | Status: SHIPPED | OUTPATIENT
Start: 2018-09-27 | End: 2018-10-09

## 2018-09-27 RX ORDER — CLOZAPINE 100 MG/1
2 TABLET ORAL 2 TIMES DAILY
COMMUNITY
Start: 2014-07-08 | End: 2022-05-25

## 2018-09-27 NOTE — PROGRESS NOTES
[facility]        NAME: Gaob Barry is a 26 y o  male  : 1995    MRN: [id number]  DATE: 2018  TIME: 4:49 PM    Assessment and Plan   Acute pansinusitis, recurrence not specified [J01 40]  1  Acute pansinusitis, recurrence not specified  amoxicillin-clavulanate (AUGMENTIN) 875-125 mg per tablet         Patient Instructions     The next DM as directed  Increase fluid intake  Follow up with PCP in 3-5 days  Proceed to  ER if symptoms worsen  Chief Complaint     Chief Complaint   Patient presents with    Sinusitis     x 2 weeks    Sore Throat         History of Present Illness       Sinusitis   Episode onset: 2 weeks  The problem is unchanged  There has been no fever  Associated symptoms include congestion, coughing (productive for yellow sputum ), headaches, sinus pressure and swollen glands  Past treatments include nothing  Sore Throat    Associated symptoms include congestion, coughing (productive for yellow sputum ), headaches and swollen glands  Review of Systems   Review of Systems   Constitutional: Negative  HENT: Positive for congestion, rhinorrhea, sinus pain and sinus pressure  Eyes: Negative  Respiratory: Positive for cough (productive for yellow sputum  )  Cardiovascular: Negative  Gastrointestinal: Negative  Endocrine: Negative  Genitourinary: Negative  Musculoskeletal: Negative  Skin: Negative  Neurological: Positive for headaches  Psychiatric/Behavioral: Negative            Current Medications       Current Outpatient Prescriptions:     clomiPRAMINE (ANAFRANIL) 50 mg capsule, Take 2 capsules by mouth, Disp: , Rfl:     cloZAPine (CLOZARIL) 100 mg tablet, Take 2 tablets by mouth, Disp: , Rfl:     amoxicillin-clavulanate (AUGMENTIN) 875-125 mg per tablet, Take 1 tablet by mouth 2 (two) times a day for 7 days, Disp: 14 tablet, Rfl: 0    Current Allergies     Allergies as of 2018 - Reviewed 2018   Allergen Reaction Noted    Prednisone  02/23/2015            The following portions of the patient's history were reviewed and updated as appropriate: allergies, current medications, past family history, past medical history, past social history, past surgical history and problem list      Past Medical History:   Diagnosis Date    Mononucleosis        Past Surgical History:   Procedure Laterality Date    TONSILLECTOMY AND ADENOIDECTOMY      Last assessed 7/8/2014       Family History   Problem Relation Age of Onset    No Known Problems Father     Cancer Family     Diabetes Family     Hyperlipidemia Family     Stroke Family          Medications have been verified  Objective   /78   Pulse (!) 118   Temp 97 8 °F (36 6 °C)   Resp 16   Ht 5' 3" (1 6 m)   Wt 96 6 kg (213 lb)   SpO2 96%   BMI 37 73 kg/m²        Physical Exam     Physical Exam   Constitutional: He is oriented to person, place, and time  He appears well-developed and well-nourished  No distress  HENT:   Head: Normocephalic and atraumatic  Right Ear: External ear normal    Left Ear: External ear normal    Nose: Mucosal edema and rhinorrhea present  Right sinus exhibits maxillary sinus tenderness and frontal sinus tenderness  Left sinus exhibits maxillary sinus tenderness and frontal sinus tenderness  Mouth/Throat: Posterior oropharyngeal erythema present  No oropharyngeal exudate or posterior oropharyngeal edema  Eyes: Pupils are equal, round, and reactive to light  Conjunctivae and EOM are normal    Neck: Normal range of motion  Neck supple  Cardiovascular: Normal rate, regular rhythm and normal heart sounds  Pulmonary/Chest: Effort normal and breath sounds normal  No respiratory distress  He has no wheezes  He has no rales  Abdominal: Soft  Bowel sounds are normal    Lymphadenopathy:     He has cervical adenopathy  Neurological: He is alert and oriented to person, place, and time  He has normal reflexes     Skin: Skin is warm and dry  He is not diaphoretic  Psychiatric: He has a normal mood and affect  His behavior is normal  Judgment and thought content normal    Nursing note and vitals reviewed

## 2018-09-27 NOTE — PATIENT INSTRUCTIONS
Rhinosinusitis   WHAT YOU NEED TO KNOW:   Rhinosinusitis (RS) is inflammation of your nose and sinuses  It commonly begins as a virus, often as a common cold  Viruses usually last 7 to 10 days and do not need treatment  When the virus does not get better on its own, you may have bacterial RS  This means that bacteria have begun to grow inside your sinuses  Acute RS lasts less than 4 weeks  Chronic RS lasts 12 weeks or more  Recurrent RS is when you have 4 or more episodes of RS in one year  DISCHARGE INSTRUCTIONS:   Return to the emergency department if:   · Your eye and eyelid are red, swollen, and painful  · You cannot open your eye  · You have double vision or you cannot see  · Your eyeball bulges out or you cannot move your eye  · You are more sleepy than normal or you notice changes in your ability to think, move, or talk  · You have a stiff neck, a fever, or a bad headache  · You have swelling of your forehead or scalp  Contact your healthcare provider if:   · Your symptoms are worse or do not improve after 3 to 5 days of treatment  · You have questions or concerns about your condition or care  Medicines: You may need any of the following:  · Acetaminophen  decreases pain and fever  It is available without a doctor's order  Ask how much to take and how often to take it  Follow directions  Acetaminophen can cause liver damage if not taken correctly  · NSAIDs , such as ibuprofen, help decrease swelling, pain, and fever  This medicine is available with or without a doctor's order  NSAIDs can cause stomach bleeding or kidney problems in certain people  If you take blood thinner medicine, always ask your healthcare provider if NSAIDs are safe for you  Always read the medicine label and follow directions  · Nasal steroid sprays  decrease inflammation in your nose and sinuses  · Decongestants  reduce swelling and drain mucus in the nose and sinuses   They may help you breathe easier  · Antihistamines  dry mucus in the nose and relieve sneezing  · Antibiotics  treat a bacterial infection and may be needed if your symptoms do not improve or they get worse  · Take your medicine as directed  Contact your healthcare provider if you think your medicine is not helping or if you have side effects  Tell him or her if you are allergic to any medicine  Keep a list of the medicines, vitamins, and herbs you take  Include the amounts, and when and why you take them  Bring the list or the pill bottles to follow-up visits  Carry your medicine list with you in case of an emergency  Self-care:   · Rinse your sinuses  Use a sinus rinse device to rinse your nasal passages with a saline (salt water) solution  This will help thin the mucus in your nose and rinse away pollen and dirt  It will also help reduce swelling so you can breathe normally  Ask your healthcare provider how often to do this  · Breathe in steam   Heat a bowl of water until you see steam  Lean over the bowl and make a tent over your head with a large towel  Breathe deeply for about 20 minutes  Be careful not to get too close to the steam or burn yourself  Do this 3 times a day  You can also breathe deeply when you take a hot shower  · Sleep with your head elevated  Place an extra pillow under your head before you go to sleep to help your sinuses drain  · Drink liquids as directed  Ask your healthcare provider how much liquid to drink each day and which liquids are best for you  Liquids will thin the mucus in your nose and help it drain  Avoid drinks that contain alcohol or caffeine  · Do not smoke, and avoid secondhand smoke  Nicotine and other chemicals in cigarettes and cigars can make your symptoms worse  Ask your healthcare provider for information if you currently smoke and need help to quit  E-cigarettes or smokeless tobacco still contain nicotine   Talk to your healthcare provider before you use these products  Follow up with your healthcare provider as directed: Follow up if your symptoms are worse or not better after 3 to 5 days of treatment  Write down your questions so you remember to ask them during your visits  © 2017 [employer] Information is for End User's use only and may not be sold, redistributed or otherwise used for commercial purposes  All illustrations and images included in CareNotes® are the copyrighted property of [employer]  or [employer]  The above information is an  only  It is not intended as medical advice for individual conditions or treatments  Talk to your doctor, nurse or pharmacist before following any medical regimen to see if it is safe and effective for you

## 2018-10-09 ENCOUNTER — OFFICE VISIT (OUTPATIENT)
Dept: FAMILY MEDICINE CLINIC | Facility: CLINIC | Age: 23
End: 2018-10-09
Payer: COMMERCIAL

## 2018-10-09 VITALS
HEIGHT: 63 IN | OXYGEN SATURATION: 98 % | WEIGHT: 208 LBS | RESPIRATION RATE: 17 BRPM | SYSTOLIC BLOOD PRESSURE: 124 MMHG | BODY MASS INDEX: 36.86 KG/M2 | DIASTOLIC BLOOD PRESSURE: 82 MMHG | TEMPERATURE: 97.5 F | HEART RATE: 110 BPM

## 2018-10-09 DIAGNOSIS — Z23 FLU VACCINE NEED: ICD-10-CM

## 2018-10-09 DIAGNOSIS — Z13.31 POSITIVE DEPRESSION SCREENING: ICD-10-CM

## 2018-10-09 DIAGNOSIS — Z00.00 ANNUAL PHYSICAL EXAM: Primary | ICD-10-CM

## 2018-10-09 DIAGNOSIS — Z01.10 ENCOUNTER FOR HEARING TEST: ICD-10-CM

## 2018-10-09 DIAGNOSIS — F84.0 AUTISTIC DISORDER, ACTIVE: ICD-10-CM

## 2018-10-09 DIAGNOSIS — R09.82 POSTNASAL DRIP: ICD-10-CM

## 2018-10-09 DIAGNOSIS — E78.1 ESSENTIAL HYPERTRIGLYCERIDEMIA: ICD-10-CM

## 2018-10-09 DIAGNOSIS — Z01.00 VISION TEST: ICD-10-CM

## 2018-10-09 DIAGNOSIS — R09.81 NASAL CONGESTION: ICD-10-CM

## 2018-10-09 DIAGNOSIS — R00.0 SINUS TACHYCARDIA: ICD-10-CM

## 2018-10-09 PROBLEM — L30.9 ECZEMA: Status: ACTIVE | Noted: 2017-12-28

## 2018-10-09 PROBLEM — R94.31 ABNORMAL EKG: Status: ACTIVE | Noted: 2018-01-06

## 2018-10-09 PROCEDURE — 99395 PREV VISIT EST AGE 18-39: CPT | Performed by: PHYSICIAN ASSISTANT

## 2018-10-09 PROCEDURE — 90686 IIV4 VACC NO PRSV 0.5 ML IM: CPT

## 2018-10-09 PROCEDURE — 90471 IMMUNIZATION ADMIN: CPT

## 2018-10-09 RX ORDER — FLUTICASONE PROPIONATE 50 MCG
1 SPRAY, SUSPENSION (ML) NASAL DAILY
Qty: 16 G | Refills: 0 | Status: SHIPPED | OUTPATIENT
Start: 2018-10-09 | End: 2019-09-04

## 2018-10-09 RX ORDER — LORATADINE 10 MG/1
10 TABLET ORAL DAILY
Qty: 30 TABLET | Refills: 0 | Status: SHIPPED | OUTPATIENT
Start: 2018-10-09 | End: 2022-05-25

## 2018-10-09 NOTE — PATIENT INSTRUCTIONS
Please start daily fish oil for triglycerides  Please keep in fridge  Heart Healthy Diet   AMBULATORY CARE:   A heart healthy diet  is an eating plan low in total fat, unhealthy fats, and sodium (salt)  A heart healthy diet helps decrease your risk for heart disease and stroke  Limit the amount of fat you eat to 25% to 35% of your total daily calories  Limit sodium to less than 2,300 mg each day  Healthy fats:  Healthy fats can help improve cholesterol levels  The risk for heart disease is decreased when cholesterol levels are normal  Choose healthy fats, such as the following:  · Unsaturated fat  is found in foods such as soybean, canola, olive, corn, and safflower oils  It is also found in soft tub margarine that is made with liquid vegetable oil  · Omega-3 fat  is found in certain fish, such as salmon, tuna, and trout, and in walnuts and flaxseed  Unhealthy fats:  Unhealthy fats can cause unhealthy cholesterol levels in your blood and increase your risk of heart disease  Limit unhealthy fats, such as the following:  · Cholesterol  is found in animal foods, such as eggs and lobster, and in dairy products made from whole milk  Limit cholesterol to less than 300 milligrams (mg) each day  You may need to limit cholesterol to 200 mg each day if you have heart disease  · Saturated fat  is found in meats, such as marroquin and hamburger  It is also found in chicken or turkey skin, whole milk, and butter  Limit saturated fat to less than 7% of your total daily calories  Limit saturated fat to less than 6% if you have heart disease or are at increased risk for it  · Trans fat  is found in packaged foods, such as potato chips and cookies  It is also in hard margarine, some fried foods, and shortening  Avoid trans fats as much as possible    Heart healthy foods and drinks to include:  Ask your dietitian or healthcare provider how many servings to have from each of the following food groups:  · Grains: ¨ Whole-wheat breads, cereals, and pastas, and brown rice    ¨ Low-fat, low-sodium crackers and chips    · Vegetables:      ¨ Broccoli, green beans, green peas, and spinach    ¨ Collards, kale, and lima beans    ¨ Carrots, sweet potatoes, tomatoes, and peppers    ¨ Canned vegetables with no salt added    · Fruits:      ¨ Bananas, peaches, pears, and pineapple    ¨ Grapes, raisins, and dates    ¨ Oranges, tangerines, grapefruit, orange juice, and grapefruit juice    ¨ Apricots, mangoes, melons, and papaya    ¨ Raspberries and strawberries    ¨ Canned fruit with no added sugar    · Low-fat dairy products:      ¨ Nonfat (skim) milk, 1% milk, and low-fat almond, cashew, or soy milks fortified with calcium    ¨ Low-fat cheese, regular or frozen yogurt, and cottage cheese    · Meats and proteins , such as lean cuts of beef and pork (loin, leg, round), skinless chicken and turkey, legumes, soy products, egg whites, and nuts  Foods and drinks to limit or avoid:  Ask your dietitian or healthcare provider about these and other foods that are high in unhealthy fat, sodium, and sugar:  · Snack or packaged foods , such as frozen dinners, cookies, macaroni and cheese, and cereals with more than 300 mg of sodium per serving    · Canned or dry mixes  for cakes, soups, sauces, or gravies    · Vegetables with added sodium , such as instant potatoes, vegetables with added sauces, or regular canned vegetables    · Other foods high in sodium , such as ketchup, barbecue sauce, salad dressing, pickles, olives, soy sauce, and miso    · High-fat dairy foods  such as whole or 2% milk, cream cheese, or sour cream, and cheeses     · High-fat protein foods  such as high-fat cuts of beef (T-bone steaks, ribs), chicken or turkey with skin, and organ meats, such as liver    · Cured or smoked meats , such as hot dogs, marroquin, and sausage    · Unhealthy fats and oils , such as butter, stick margarine, shortening, and cooking oils such as coconut or palm oil    · Food and drinks high in sugar , such as soft drinks (soda), sports drinks, sweetened tea, candy, cake, cookies, pies, and doughnuts  Other diet guidelines to follow:   · Eat more foods containing omega-3 fats  Eat fish high in omega-3 fats at least 2 times a week  · Limit alcohol  Too much alcohol can damage your heart and raise your blood pressure  Women should limit alcohol to 1 drink a day  Men should limit alcohol to 2 drinks a day  A drink of alcohol is 12 ounces of beer, 5 ounces of wine, or 1½ ounces of liquor  · Choose low-sodium foods  High-sodium foods can lead to high blood pressure  Add little or no salt to food you prepare  Use herbs and spices in place of salt  · Eat more fiber  to help lower cholesterol levels  Eat at least 5 servings of fruits and vegetables each day  Eat 3 ounces of whole-grain foods each day  Legumes (beans) are also a good source of fiber  Lifestyle guidelines:   · Do not smoke  Nicotine and other chemicals in cigarettes and cigars can cause lung and heart damage  Ask your healthcare provider for information if you currently smoke and need help to quit  E-cigarettes or smokeless tobacco still contain nicotine  Talk to your healthcare provider before you use these products  · Exercise regularly  to help you maintain a healthy weight and improve your blood pressure and cholesterol levels  Ask your healthcare provider about the best exercise plan for you  Do not start an exercise program without asking your healthcare provider  Follow up with your healthcare provider as directed:  Write down your questions so you remember to ask them during your visits  © 2017 [employer]  Information is for End User's use only and may not be sold, redistributed or otherwise used for commercial purposes  All illustrations and images included in CareNotes® are the copyrighted property of [employer]  or Carlos Malin    The above information is an  only  It is not intended as medical advice for individual conditions or treatments  Talk to your doctor, nurse or pharmacist before following any medical regimen to see if it is safe and effective for you  Suicide Prevention for Adults   AMBULATORY CARE:   What you need to know about suicide prevention:  A person may see suicide as the only way to escape emotional or physical pain and suffering  You can help provide emotional support for him or her and get the help he or she needs  Learn to recognize warning signs that the person may be considering suicide  Find resources to help prevent him or her from attempting to take his or her life  Call 911 if:   · The person has done something on purpose to hurt himself or herself  · The person tries to commit suicide  Seek care immediately if:   · The person tells you he or she made a plan to commit suicide  · The person acts out in anger, is reckless, or is abusing alcohol or drugs  · The person has serious thoughts of suicide, even with treatment  Contact the person's healthcare provider or therapist if:   · You begin to see warning signs that the person may be considering suicide  · The person has intense feelings of sadness, anger, revenge, or despair, or he cannot make decisions easily  · The person tells you he or she has more thoughts of suicide when they are alone  · The person withdraws from others  · The person stops eating, or begins to smoke or drink heavily  · The person feels he or she is a burden because of a disability or disease  · The person has trouble dealing with stress, such as a breakup or a job loss  · You have questions or concerns about the person's condition or care  What to do if the person is having thoughts of suicide:  Call 911 if you feel the person is at immediate risk of suicide, or if he or she talks about an active suicide plan   Assume that the person intends to carry out his or her plan  Resources are available to help you and the person  The following are some things you can do:  · Call the [facility] at 4-611-824-TALK (3956)   · Call the Suicide Hotline at 4-559-GLTRJFL (2-491.516.1547)   · Contact the person's therapist  His or her healthcare provider can give you a list of therapists if he or she does not have one  · Keep medicines, weapons, and alcohol out of the person's reach  Do not leave the person alone if he or she says they want to commit suicide  Do not leave the person alone if you think he or she may try it  Make sure you do not put yourself at risk if the person has a weapon  · Do not be afraid to ask if the person is thinking of ending his or her life  Ask if the person has a plan for hurting or killing himself or herself  Warning signs to watch for:   · Talking about a plan for committing suicide, or suddenly deciding to make a will    · Cutting himself or herself, burning the skin with cigarettes, or driving recklessly    · Drug or alcohol use, not taking prescribed medicine, or taking too much prescribed medicine    · Sudden anger, lashing out at others, or seeming hopeless, anxious, or angry and then suddenly becoming happy or peaceful    · Not wanting to spend time with others or doing things he or she usually enjoys    · Trouble at work, or not showing up for work    · A change in the way he or she eats, sleeps, or dresses    · Weight gain or loss or having less energy than usual    · Trouble sleeping or spending a lot of time sleeping    · Giving away or throwing away his or her belongings  Treatments the person may need:   · Medicines  may be given to prevent mood swings, or to decrease anxiety or depression  The person will need to take all medicines as directed  A sudden stop can be harmful  It may take 4 to 6 weeks for the medicine to help him or her feel better      · A therapist  can help the person identify and change negative feelings or beliefs about himself or herself  This may also help change the way the he or she feels and acts  A therapist can also help the person find ways to cope with things that cannot be changed  What you can do to help the person:   · Encourage the person to seek help for drug or alcohol abuse  Drugs and alcohol can increase suicidal thoughts and make the person more likely to act on them  · Help the person connect with others  Encourage him or her to become involved in the community  Some examples include tutoring a young student, volunteering at a [employer], or joining a group exercise program  The person may need help setting up a computer or creating an e-mail account to help him or her remain connected to others  · Exercise with the person  Exercise can lift his or her mood, increase energy, and make it easier to sleep at night  · Encourage the person to try new things  Adults who are open to new experiences handle stress and change better than those who are not  · Call, visit, or send postcards to the person often  Check on him or her after the loss of a pet, longtime friend, or child  Holidays, birthdays, and anniversaries can be difficult for a person after a loss  The loss of a spouse can be especially painful and lonely  · Help the person schedule a visit with his or her Uatsdin or spiritual leader  A Uatsdin or spiritual leader may be able to offer additional support and resources to the person  · Help the person get equipment that will increase his or her comfort and mobility  Examples are hearing aids, glasses, large print books, and walkers  These can help him or her enjoy activities and feel more independent  · Encourage the person to continue taking medicine and going to therapy  Medicine and therapy can help improve his or her mental health    For support and more information:   · National Suicide Prevention 2021 Nabil Gross , [address] [facility]  Phone: 0- 496 - 662-ZZKK (01 33 43 04 02)  Web Address: [place]  · Suicide Awareness Voices of Education  [address]  [address]  Phone: 9- 199 - 185-5541  Web Address: [employer]  Follow up with the person's healthcare provider and therapist as directed:  Write down your questions so you remember to ask them during your visits  © 2017 [employer] Information is for End User's use only and may not be sold, redistributed or otherwise used for commercial purposes  All illustrations and images included in CareNotes® are the copyrighted property of [employer]  or Carlos Malin  The above information is an  only  It is not intended as medical advice for individual conditions or treatments  Talk to your doctor, nurse or pharmacist before following any medical regimen to see if it is safe and effective for you  Wellness Visit for Adults   AMBULATORY CARE:   A wellness visit  is when you see your healthcare provider to get screened for health problems  You can also get advice on how to stay healthy  Write down your questions so you remember to ask them  Ask your healthcare provider how often you should have a wellness visit  What happens at a wellness visit:  Your healthcare provider will ask about your health, and your family history of health problems  This includes high blood pressure, heart disease, and cancer  He or she will ask if you have symptoms that concern you, if you smoke, and about your mood  You may also be asked about your intake of medicines, supplements, food, and alcohol  Any of the following may be done:  · Your weight  will be checked  Your height may also be checked so your body mass index (BMI) can be calculated  Your BMI shows if you are at a healthy weight  · Your blood pressure  and heart rate will be checked   Your temperature may also be checked  · Blood and urine tests  may be done  Blood tests may be done to check your cholesterol levels  Abnormal cholesterol levels increase your risk for heart disease and stroke  You may also need a blood or urine test to check for diabetes if you are at increased risk  Urine tests may be done to look for signs of an infection or kidney disease  · A physical exam  includes checking your heartbeat and lungs with a stethoscope  Your healthcare provider may also check your skin to look for sun damage  · Screening tests  may be recommended  A screening test is done to check for diseases that may not cause symptoms  The screening tests you may need depend on your age, gender, family history, and lifestyle habits  For example, colorectal screening may be recommended if you are 48years old or older  Screening tests you need if you are a woman:   · A Pap smear  is used to screen for cervical cancer  Pap smears are usually done every 3 to 5 years depending on your age  You may need them more often if you have had abnormal Pap smear test results in the past  Ask your healthcare provider how often you should have a Pap smear  · A mammogram  is an x-ray of your breasts to screen for breast cancer  Experts recommend mammograms every 2 years starting at age 48 years  You may need a mammogram at age 52 years or younger if you have an increased risk for breast cancer  Talk to your healthcare provider about when you should start having mammograms and how often you need them  Vaccines you may need:   · Get an influenza vaccine  every year  The influenza vaccine protects you from the flu  Several types of viruses cause the flu  The viruses change over time, so new vaccines are made each year  · Get a tetanus-diphtheria (Td) booster vaccine  every 10 years  This vaccine protects you against tetanus and diphtheria  Tetanus is a severe infection that may cause painful muscle spasms and lockjaw   Diphtheria is a severe bacterial infection that causes a thick covering in the back of your mouth and throat  · Get a human papillomavirus (HPV) vaccine  if you are female and aged 23 to 32 or male 23 to 24 and never received it  This vaccine protects you from HPV infection  HPV is the most common infection spread by sexual contact  HPV may also cause vaginal, penile, and anal cancers  · Get a pneumococcal vaccine  if you are aged 72 years or older  The pneumococcal vaccine is an injection given to protect you from pneumococcal disease  Pneumococcal disease is an infection caused by pneumococcal bacteria  The infection may cause pneumonia, meningitis, or an ear infection  · Get a shingles vaccine  if you are aged 61 or older, even if you have had shingles before  The shingles vaccine is an injection to protect you from the varicella-zoster virus  This is the same virus that causes chickenpox  Shingles is a painful rash that develops in people who had chickenpox or have been exposed to the virus  How to eat healthy:  My Plate is a model for planning healthy meals  It shows the types and amounts of foods that should go on your plate  Fruits and vegetables make up about half of your plate, and grains and protein make up the other half  A serving of dairy is included on the side of your plate  The amount of calories and serving sizes you need depends on your age, gender, weight, and height  Examples of healthy foods are listed below:  · Eat a variety of vegetables  such as dark green, red, and orange vegetables  You can also include canned vegetables low in sodium (salt) and frozen vegetables without added butter or sauces  · Eat a variety of fresh fruits , canned fruit in 100% juice, frozen fruit, and dried fruit  · Include whole grains  At least half of the grains you eat should be whole grains   Examples include whole-wheat bread, wheat pasta, brown rice, and whole-grain cereals such as oatmeal     · Eat a variety of protein foods such as seafood (fish and shellfish), lean meat, and poultry without skin (turkey and chicken)  Examples of lean meats include pork leg, shoulder, or tenderloin, and beef round, sirloin, tenderloin, and extra lean ground beef  Other protein foods include eggs and egg substitutes, beans, peas, soy products, nuts, and seeds  · Choose low-fat dairy products such as skim or 1% milk or low-fat yogurt, cheese, and cottage cheese  · Limit unhealthy fats  such as butter, hard margarine, and shortening  Exercise:  Exercise at least 30 minutes per day on most days of the week  Some examples of exercise include walking, biking, dancing, and swimming  You can also fit in more physical activity by taking the stairs instead of the elevator or parking farther away from stores  Include muscle strengthening activities 2 days each week  Regular exercise provides many health benefits  It helps you manage your weight, and decreases your risk for type 2 diabetes, heart disease, stroke, and high blood pressure  Exercise can also help improve your mood  Ask your healthcare provider about the best exercise plan for you  General health and safety guidelines:   · Do not smoke  Nicotine and other chemicals in cigarettes and cigars can cause lung damage  Ask your healthcare provider for information if you currently smoke and need help to quit  E-cigarettes or smokeless tobacco still contain nicotine  Talk to your healthcare provider before you use these products  · Limit alcohol  A drink of alcohol is 12 ounces of beer, 5 ounces of wine, or 1½ ounces of liquor  · Lose weight, if needed  Being overweight increases your risk of certain health conditions  These include heart disease, high blood pressure, type 2 diabetes, and certain types of cancer  · Protect your skin  Do not sunbathe or use tanning beds  Use sunscreen with a SPF 15 or higher   Apply sunscreen at least 15 minutes before you go outside  Reapply sunscreen every 2 hours  Wear protective clothing, hats, and sunglasses when you are outside  · Drive safely  Always wear your seatbelt  Make sure everyone in your car wears a seatbelt  A seatbelt can save your life if you are in an accident  Do not use your cell phone when you are driving  This could distract you and cause an accident  Pull over if you need to make a call or send a text message  · Practice safe sex  Use latex condoms if are sexually active and have more than one partner  Your healthcare provider may recommend screening tests for sexually transmitted infections (STIs)  · Wear helmets, lifejackets, and protective gear  Always wear a helmet when you ride a bike or motorcycle, go skiing, or play sports that could cause a head injury  Wear protective equipment when you play sports  Wear a lifejacket when you are on a boat or doing water sports  © 2017 [facility] Information is for End User's use only and may not be sold, redistributed or otherwise used for commercial purposes  All illustrations and images included in CareNotes® are the copyrighted property of [employer]  or Carlos Malin  The above information is an  only  It is not intended as medical advice for individual conditions or treatments  Talk to your doctor, nurse or pharmacist before following any medical regimen to see if it is safe and effective for you

## 2018-10-09 NOTE — PROGRESS NOTES
H&P    Jayy Fowler 26 y o  male   Date:  10/10/2018      Assessment and Plan:    Hernan Valdez was seen today for nasal congestion, cold like symptoms and well check  Diagnoses and all orders for this visit:    Annual physical exam    Flu vaccine need  -     SYRINGE/SINGLE-DOSE VIAL: influenza vaccine, [id number], quadrivalent, 0 5 mL, preservative-free, for patients 3+ yr (FLUZONE)    Encounter for hearing test    Vision test    Nasal congestion  -     fluticasone (FLONASE) 50 mcg/act nasal spray; 1 spray into each nostril daily  -     loratadine (CLARITIN) 10 mg tablet; Take 1 tablet (10 mg total) by mouth daily    Postnasal drip  -     fluticasone (FLONASE) 50 mcg/act nasal spray; 1 spray into each nostril daily  -     loratadine (CLARITIN) 10 mg tablet; Take 1 tablet (10 mg total) by mouth daily    Essential hypertriglyceridemia  - start fish oil, continue exercise and low fat diet     Sinus tachycardia  -     TSH, 3rd generation with Free T4 reflex; Future  -     ECG 12 lead; Future  -     Comprehensive metabolic panel; Future    Autistic disorder, active; Positive depression screening  - pt will be starting therapy tomorrow, continue every 3 month f/u with [facility]   - given crisis/suicide prevention information          HPI:  Chief Complaint   Patient presents with    Nasal Congestion    Cold Like Symptoms    Well Check     due for yearly physical per mom     HPI   Patient is a 25 yo male who presents for routine physical  He follows regularly with Martine Gonzalez at [facility]  in [facility] every 3 months, who manages his medication for autism and schizoaffective disorder  No changes in social situation  Patient concerns: He has had postnasal drip s/p sinus infection after being treated with augment  No fevers or chills  He just has a lot of persistent sinus congestion  Mom was unsure of what was safe to take with his current home medications   Patient did have a positive depression screen for which he follows psych for and will be starting therapy up again tomorrow  Mom is aware and there is good open communication with the son  He also was working with nutrition and has been improving to a low fat diet which has helped his TG which were recently measured by psych (in care everywhere)  He does have elevated HR but denies palpitations, feeling of heart racing, chest pain, lightheadedness, dizziness  Sleep: no issues   Elimination: no issues   Oral health: UTD on dental   Seat belt: yes   Guns at home: none   Pets: Dog   Smoke detectors: yes   Passive smoke exposure: none    ROS: Review of Systems   Constitutional: Negative for chills, fatigue, fever and unexpected weight change  HENT: Positive for congestion and postnasal drip  Negative for ear pain, hearing loss, nosebleeds, sore throat and trouble swallowing  Eyes: Negative for pain, discharge and visual disturbance  Respiratory: Negative for cough, shortness of breath and wheezing  Cardiovascular: Negative for chest pain, palpitations and leg swelling  Gastrointestinal: Negative for abdominal pain, blood in stool, constipation, diarrhea, nausea and vomiting  Endocrine: Negative for cold intolerance and heat intolerance  Genitourinary: Negative for difficulty urinating, dysuria and hematuria  Musculoskeletal: Negative for arthralgias, gait problem and myalgias  Skin: Negative for color change, rash and wound  Neurological: Negative for dizziness, syncope, weakness, light-headedness and headaches  Hematological: Negative for adenopathy  Does not bruise/bleed easily  Psychiatric/Behavioral: Positive for dysphoric mood  Negative for confusion, hallucinations, self-injury and sleep disturbance          Austistic       Past Medical History:   Diagnosis Date    Mononucleosis      Patient Active Problem List   Diagnosis    Abnormal EKG    Autistic disorder, active    Depression    Eczema    Essential hypertriglyceridemia    Hyperlipidemia    Left-sided Bell's palsy    Obese    PDD (pervasive developmental disorder)    Plantar warts    [facility] schizophreniform disorder ([facility] [address] )    Sleep apnea    Vitamin D deficiency       Past Surgical History:   Procedure Laterality Date    TONSILLECTOMY AND ADENOIDECTOMY      Last assessed 7/8/2014       Social History     Social History    Marital status: Single     Spouse name: N/A    Number of children: N/A    Years of education: N/A     Social History Main Topics    Smoking status: Never Smoker    Smokeless tobacco: Not on file    Alcohol use No    Drug use: Unknown    Sexual activity: Not on file     Other Topics Concern    Not on file     Social History Narrative    Uses seatbelts       Family History   Problem Relation Age of Onset    No Known Problems Father     Cancer Family     Diabetes Family     Hyperlipidemia Family     Stroke Family        Allergies   Allergen Reactions    Prednisone          Current Outpatient Prescriptions:     clomiPRAMINE (ANAFRANIL) 50 mg capsule, Take 2 capsules by mouth, Disp: , Rfl:     cloZAPine (CLOZARIL) 100 mg tablet, Take 2 tablets by mouth, Disp: , Rfl:     fluticasone (FLONASE) 50 mcg/act nasal spray, 1 spray into each nostril daily, Disp: 16 g, Rfl: 0    loratadine (CLARITIN) 10 mg tablet, Take 1 tablet (10 mg total) by mouth daily, Disp: 30 tablet, Rfl: 0      Physical Exam:  /82   Pulse (!) 110   Temp 97 5 °F (36 4 °C)   Resp 17   Ht 5' 3 39" (1 61 m)   Wt 94 3 kg (208 lb)   SpO2 98%   BMI 36 40 kg/m²     Physical Exam   Constitutional: He is oriented to person, place, and time  Vital signs are normal  He appears well-developed and well-nourished  No distress  HENT:   Head: Normocephalic and atraumatic  Right Ear: Tympanic membrane, external ear and ear canal normal    Left Ear: Tympanic membrane, external ear and ear canal normal    Nose: Mucosal edema present     Mouth/Throat: Uvula is midline, oropharynx is clear and moist and mucous membranes are normal  No oropharyngeal exudate or posterior oropharyngeal edema  Postnasal drip in posterior oropharynx   Eyes: Pupils are equal, round, and reactive to light  Conjunctivae and lids are normal    Neck: Trachea normal and normal range of motion  Neck supple  No thyromegaly present  Cardiovascular: Normal rate, regular rhythm, S1 normal, S2 normal and intact distal pulses  Exam reveals no gallop  No murmur heard  Pulmonary/Chest: Breath sounds normal  No respiratory distress  He has no wheezes  He has no rhonchi  He has no rales  Abdominal: Soft  Normal appearance and bowel sounds are normal  He exhibits no mass  There is no hepatosplenomegaly  There is no tenderness  Musculoskeletal: Normal range of motion  He exhibits no edema or deformity  Lymphadenopathy:     He has no cervical adenopathy  Neurological: He is alert and oriented to person, place, and time  He has normal reflexes  No cranial nerve deficit or sensory deficit  Skin: Skin is warm and dry  No rash noted  No cyanosis  No pallor  Nails show no clubbing     Psychiatric: His behavior is normal  Cognition and memory are normal    Stable mood   Slightly flat affect          Labs:  Lab Results   Component Value Date    WBC 6 98 09/03/2016    HGB 16 0 09/03/2016    HCT 45 3 09/03/2016    MCV 88 09/03/2016     09/03/2016

## 2018-10-12 ENCOUNTER — TRANSCRIBE ORDERS (OUTPATIENT)
Dept: ADMINISTRATIVE | Facility: HOSPITAL | Age: 23
End: 2018-10-12

## 2018-10-12 ENCOUNTER — HOSPITAL ENCOUNTER (OUTPATIENT)
Dept: NON INVASIVE DIAGNOSTICS | Facility: HOSPITAL | Age: 23
Discharge: HOME | End: 2018-10-12
Payer: COMMERCIAL

## 2018-10-12 ENCOUNTER — APPOINTMENT (OUTPATIENT)
Dept: LAB | Facility: HOSPITAL | Age: 23
End: 2018-10-12
Payer: COMMERCIAL

## 2018-10-12 DIAGNOSIS — R00.0 SINUS TACHYCARDIA: ICD-10-CM

## 2018-10-12 LAB
ALBUMIN SERPL BCP-MCNC: 4.9 G/DL (ref 3.5–5.7)
ALP SERPL-CCNC: 68 U/L (ref 40–150)
ALT SERPL W P-5'-P-CCNC: 62 U/L (ref 7–52)
ANION GAP SERPL CALCULATED.3IONS-SCNC: 7 MMOL/L (ref 4–13)
AST SERPL W P-5'-P-CCNC: 31 U/L (ref 13–39)
ATRIAL RATE: 100 BPM
BILIRUB SERPL-MCNC: 0.7 MG/DL (ref 0.2–1)
BUN SERPL-MCNC: 14 MG/DL (ref 7–25)
CALCIUM SERPL-MCNC: 9.9 MG/DL (ref 8.6–10.5)
CHLORIDE SERPL-SCNC: 100 MMOL/L (ref 98–107)
CO2 SERPL-SCNC: 30 MMOL/L (ref 21–31)
CREAT SERPL-MCNC: 1 MG/DL (ref 0.7–1.3)
GFR SERPL CREATININE-BSD FRML MDRD: 106 ML/MIN/1.73SQ M
GLUCOSE P FAST SERPL-MCNC: 88 MG/DL (ref 65–99)
P AXIS: 44 DEGREES
POTASSIUM SERPL-SCNC: 4.3 MMOL/L (ref 3.5–5.5)
PR INTERVAL: 134 MS
PROT SERPL-MCNC: 7.3 G/DL (ref 6.4–8.9)
QRS AXIS: 23 DEGREES
QRSD INTERVAL: 94 MS
QT INTERVAL: 346 MS
QTC INTERVAL: 446 MS
SODIUM SERPL-SCNC: 137 MMOL/L (ref 134–143)
T WAVE AXIS: 50 DEGREES
TSH SERPL DL<=0.05 MIU/L-ACNC: 2.21 UIU/ML (ref 0.45–5.33)
VENTRICULAR RATE: 100 BPM

## 2018-10-12 PROCEDURE — 80053 COMPREHEN METABOLIC PANEL: CPT

## 2018-10-12 PROCEDURE — 36415 COLL VENOUS BLD VENIPUNCTURE: CPT

## 2018-10-12 PROCEDURE — 93005 ELECTROCARDIOGRAM TRACING: CPT

## 2018-10-12 PROCEDURE — 84443 ASSAY THYROID STIM HORMONE: CPT

## 2018-10-12 PROCEDURE — 93010 ELECTROCARDIOGRAM REPORT: CPT | Performed by: INTERNAL MEDICINE

## 2018-10-18 DIAGNOSIS — R74.01 ELEVATED ALT MEASUREMENT: Primary | ICD-10-CM

## 2018-11-01 ENCOUNTER — HOSPITAL ENCOUNTER (EMERGENCY)
Facility: HOSPITAL | Age: 23
DRG: 389 | End: 2018-11-01
Attending: EMERGENCY MEDICINE | Admitting: EMERGENCY MEDICINE
Payer: COMMERCIAL

## 2018-11-01 ENCOUNTER — HOSPITAL ENCOUNTER (INPATIENT)
Facility: HOSPITAL | Age: 23
LOS: 4 days | Discharge: HOME HEALTH | DRG: 389 | End: 2018-11-05
Attending: INTERNAL MEDICINE | Admitting: INTERNAL MEDICINE
Payer: COMMERCIAL

## 2018-11-01 ENCOUNTER — APPOINTMENT (EMERGENCY)
Dept: RADIOLOGY | Facility: HOSPITAL | Age: 23
DRG: 389 | End: 2018-11-01
Payer: COMMERCIAL

## 2018-11-01 ENCOUNTER — APPOINTMENT (EMERGENCY)
Dept: CT IMAGING | Facility: HOSPITAL | Age: 23
DRG: 389 | End: 2018-11-01
Payer: COMMERCIAL

## 2018-11-01 VITALS
TEMPERATURE: 98.8 F | RESPIRATION RATE: 18 BRPM | HEIGHT: 63 IN | SYSTOLIC BLOOD PRESSURE: 140 MMHG | DIASTOLIC BLOOD PRESSURE: 87 MMHG | OXYGEN SATURATION: 93 % | BODY MASS INDEX: 37.92 KG/M2 | HEART RATE: 134 BPM | WEIGHT: 214 LBS

## 2018-11-01 DIAGNOSIS — R10.9 ACUTE ABDOMINAL PAIN: Primary | ICD-10-CM

## 2018-11-01 DIAGNOSIS — R00.0 TACHYCARDIA: ICD-10-CM

## 2018-11-01 DIAGNOSIS — E86.9 VOLUME DEPLETION: ICD-10-CM

## 2018-11-01 DIAGNOSIS — K56.7 ILEUS (HCC): Primary | ICD-10-CM

## 2018-11-01 DIAGNOSIS — R11.10 VOMITING: ICD-10-CM

## 2018-11-01 PROBLEM — E86.0 ACUTE DEHYDRATION: Chronic | Status: ACTIVE | Noted: 2018-11-01

## 2018-11-01 PROBLEM — E87.2 LACTIC ACIDOSIS: Chronic | Status: ACTIVE | Noted: 2018-11-01

## 2018-11-01 PROBLEM — R10.33 PERIUMBILICAL ABDOMINAL PAIN: Chronic | Status: ACTIVE | Noted: 2018-11-01

## 2018-11-01 PROBLEM — R10.33 PERIUMBILICAL ABDOMINAL PAIN: Status: ACTIVE | Noted: 2018-11-01

## 2018-11-01 PROBLEM — E86.0 ACUTE DEHYDRATION: Status: ACTIVE | Noted: 2018-11-01

## 2018-11-01 PROBLEM — E87.20 LACTIC ACIDOSIS: Status: ACTIVE | Noted: 2018-11-01

## 2018-11-01 PROBLEM — K52.9 ACUTE GASTROENTERITIS: Chronic | Status: ACTIVE | Noted: 2018-11-01

## 2018-11-01 PROBLEM — K52.9 ACUTE GASTROENTERITIS: Status: ACTIVE | Noted: 2018-11-01

## 2018-11-01 PROBLEM — E87.20 LACTIC ACIDOSIS: Chronic | Status: ACTIVE | Noted: 2018-11-01

## 2018-11-01 PROBLEM — E87.2 LACTIC ACIDOSIS: Status: ACTIVE | Noted: 2018-11-01

## 2018-11-01 LAB
ALBUMIN SERPL BCP-MCNC: 4.8 G/DL (ref 3.5–5.7)
ALP SERPL-CCNC: 64 U/L (ref 40–150)
ALT SERPL W P-5'-P-CCNC: 47 U/L (ref 7–52)
ANION GAP SERPL CALCULATED.3IONS-SCNC: 18 MMOL/L (ref 4–13)
APTT PPP: 36 SECONDS (ref 24–36)
AST SERPL W P-5'-P-CCNC: 19 U/L (ref 13–39)
BACTERIA UR QL AUTO: ABNORMAL /HPF
BASOPHILS # BLD AUTO: 0 THOUSANDS/ΜL (ref 0–0.1)
BASOPHILS NFR BLD AUTO: 0 % (ref 0–1)
BILIRUB SERPL-MCNC: 0.9 MG/DL (ref 0.2–1)
BILIRUB UR QL STRIP: NEGATIVE
BUN SERPL-MCNC: 27 MG/DL (ref 7–25)
CALCIUM SERPL-MCNC: 9.7 MG/DL (ref 8.6–10.5)
CHLORIDE SERPL-SCNC: 101 MMOL/L (ref 98–107)
CLARITY UR: CLEAR
CO2 SERPL-SCNC: 16 MMOL/L (ref 21–31)
COLOR UR: YELLOW
CREAT SERPL-MCNC: 1.21 MG/DL (ref 0.7–1.3)
EOSINOPHIL # BLD AUTO: 0 THOUSAND/ΜL (ref 0–0.61)
EOSINOPHIL NFR BLD AUTO: 0 % (ref 0–6)
ERYTHROCYTE [DISTWIDTH] IN BLOOD BY AUTOMATED COUNT: 13.4 % (ref 11.6–15.1)
GFR SERPL CREATININE-BSD FRML MDRD: 84 ML/MIN/1.73SQ M
GLUCOSE SERPL-MCNC: 137 MG/DL (ref 65–140)
GLUCOSE UR STRIP-MCNC: NEGATIVE MG/DL
HCT VFR BLD AUTO: 50.9 % (ref 42–52)
HGB BLD-MCNC: 17.4 G/DL (ref 12–17)
HGB UR QL STRIP.AUTO: NEGATIVE
INR PPP: 1.18 (ref 0.9–1.5)
KETONES UR STRIP-MCNC: NEGATIVE MG/DL
LACTATE SERPL-SCNC: 2.2 MMOL/L (ref 0.5–2)
LACTATE SERPL-SCNC: 3.5 MMOL/L (ref 0.5–2)
LEUKOCYTE ESTERASE UR QL STRIP: NEGATIVE
LIPASE SERPL-CCNC: <10 U/L (ref 11–82)
LYMPHOCYTES # BLD AUTO: 0.9 THOUSANDS/ΜL (ref 0.6–4.47)
LYMPHOCYTES NFR BLD AUTO: 11 % (ref 14–44)
MCH RBC QN AUTO: 31 PG (ref 26.8–34.3)
MCHC RBC AUTO-ENTMCNC: 34.2 G/DL (ref 31.4–37.4)
MCV RBC AUTO: 91 FL (ref 82–98)
MONOCYTES # BLD AUTO: 1.3 THOUSAND/ΜL (ref 0.17–1.22)
MONOCYTES NFR BLD AUTO: 17 % (ref 4–12)
NEUTROPHILS # BLD AUTO: 5.9 THOUSANDS/ΜL (ref 1.85–7.62)
NEUTS SEG NFR BLD AUTO: 73 % (ref 43–75)
NITRITE UR QL STRIP: NEGATIVE
NON-SQ EPI CELLS URNS QL MICRO: ABNORMAL /HPF
NRBC BLD AUTO-RTO: 0 /100 WBCS
PH UR STRIP.AUTO: 6.5 [PH] (ref 5–8)
PLATELET # BLD AUTO: 317 THOUSANDS/UL (ref 149–390)
PMV BLD AUTO: 8.3 FL (ref 8.9–12.7)
POTASSIUM SERPL-SCNC: 3.9 MMOL/L (ref 3.5–5.5)
PROT SERPL-MCNC: 7.5 G/DL (ref 6.4–8.9)
PROT UR STRIP-MCNC: ABNORMAL MG/DL
PROTHROMBIN TIME: 13.6 SECONDS (ref 10.1–12.9)
RBC # BLD AUTO: 5.62 MILLION/UL (ref 3.88–5.62)
RBC #/AREA URNS AUTO: ABNORMAL /HPF
SODIUM SERPL-SCNC: 135 MMOL/L (ref 134–143)
SP GR UR STRIP.AUTO: 1.01 (ref 1–1.03)
TROPONIN I SERPL-MCNC: <0.03 NG/ML
UROBILINOGEN UR QL STRIP.AUTO: 0.2 E.U./DL
WBC # BLD AUTO: 8.1 THOUSAND/UL (ref 4.31–10.16)
WBC #/AREA URNS AUTO: ABNORMAL /HPF

## 2018-11-01 PROCEDURE — 80053 COMPREHEN METABOLIC PANEL: CPT | Performed by: EMERGENCY MEDICINE

## 2018-11-01 PROCEDURE — 96365 THER/PROPH/DIAG IV INF INIT: CPT

## 2018-11-01 PROCEDURE — C9113 INJ PANTOPRAZOLE SODIUM, VIA: HCPCS | Performed by: NURSE PRACTITIONER

## 2018-11-01 PROCEDURE — 99223 1ST HOSP IP/OBS HIGH 75: CPT | Performed by: NURSE PRACTITIONER

## 2018-11-01 PROCEDURE — 83605 ASSAY OF LACTIC ACID: CPT | Performed by: NURSE PRACTITIONER

## 2018-11-01 PROCEDURE — 93005 ELECTROCARDIOGRAM TRACING: CPT

## 2018-11-01 PROCEDURE — 83605 ASSAY OF LACTIC ACID: CPT | Performed by: INTERNAL MEDICINE

## 2018-11-01 PROCEDURE — 74018 RADEX ABDOMEN 1 VIEW: CPT

## 2018-11-01 PROCEDURE — 85610 PROTHROMBIN TIME: CPT | Performed by: EMERGENCY MEDICINE

## 2018-11-01 PROCEDURE — 85025 COMPLETE CBC W/AUTO DIFF WBC: CPT | Performed by: EMERGENCY MEDICINE

## 2018-11-01 PROCEDURE — 83605 ASSAY OF LACTIC ACID: CPT | Performed by: EMERGENCY MEDICINE

## 2018-11-01 PROCEDURE — 96366 THER/PROPH/DIAG IV INF ADDON: CPT

## 2018-11-01 PROCEDURE — 36415 COLL VENOUS BLD VENIPUNCTURE: CPT | Performed by: EMERGENCY MEDICINE

## 2018-11-01 PROCEDURE — 83690 ASSAY OF LIPASE: CPT | Performed by: EMERGENCY MEDICINE

## 2018-11-01 PROCEDURE — 87040 BLOOD CULTURE FOR BACTERIA: CPT | Performed by: EMERGENCY MEDICINE

## 2018-11-01 PROCEDURE — 85730 THROMBOPLASTIN TIME PARTIAL: CPT | Performed by: EMERGENCY MEDICINE

## 2018-11-01 PROCEDURE — 81001 URINALYSIS AUTO W/SCOPE: CPT | Performed by: EMERGENCY MEDICINE

## 2018-11-01 PROCEDURE — 96361 HYDRATE IV INFUSION ADD-ON: CPT

## 2018-11-01 PROCEDURE — 74177 CT ABD & PELVIS W/CONTRAST: CPT

## 2018-11-01 PROCEDURE — 84484 ASSAY OF TROPONIN QUANT: CPT | Performed by: EMERGENCY MEDICINE

## 2018-11-01 PROCEDURE — 99285 EMERGENCY DEPT VISIT HI MDM: CPT

## 2018-11-01 PROCEDURE — 71045 X-RAY EXAM CHEST 1 VIEW: CPT

## 2018-11-01 RX ORDER — LEVOFLOXACIN 5 MG/ML
750 INJECTION, SOLUTION INTRAVENOUS ONCE
Status: COMPLETED | OUTPATIENT
Start: 2018-11-01 | End: 2018-11-01

## 2018-11-01 RX ORDER — ONDANSETRON 2 MG/ML
4 INJECTION INTRAMUSCULAR; INTRAVENOUS EVERY 6 HOURS PRN
Status: DISCONTINUED | OUTPATIENT
Start: 2018-11-01 | End: 2018-11-05 | Stop reason: HOSPADM

## 2018-11-01 RX ORDER — LORAZEPAM 2 MG/ML
0.5 INJECTION INTRAMUSCULAR ONCE
Status: COMPLETED | OUTPATIENT
Start: 2018-11-01 | End: 2018-11-01

## 2018-11-01 RX ORDER — PANTOPRAZOLE SODIUM 40 MG/1
40 INJECTION, POWDER, FOR SOLUTION INTRAVENOUS EVERY 12 HOURS SCHEDULED
Status: DISCONTINUED | OUTPATIENT
Start: 2018-11-01 | End: 2018-11-05

## 2018-11-01 RX ORDER — SODIUM CHLORIDE 9 MG/ML
75 INJECTION, SOLUTION INTRAVENOUS CONTINUOUS
Status: DISCONTINUED | OUTPATIENT
Start: 2018-11-01 | End: 2018-11-05 | Stop reason: HOSPADM

## 2018-11-01 RX ADMIN — SODIUM CHLORIDE 125 ML/HR: 9 INJECTION, SOLUTION INTRAVENOUS at 23:35

## 2018-11-01 RX ADMIN — LEVOFLOXACIN 750 MG: 5 INJECTION, SOLUTION INTRAVENOUS at 17:16

## 2018-11-01 RX ADMIN — IOHEXOL 80 ML: 350 INJECTION, SOLUTION INTRAVENOUS at 18:01

## 2018-11-01 RX ADMIN — SODIUM CHLORIDE 3000 ML: 0.9 INJECTION, SOLUTION INTRAVENOUS at 17:17

## 2018-11-01 RX ADMIN — LORAZEPAM 0.5 MG: 2 INJECTION INTRAMUSCULAR; INTRAVENOUS at 23:05

## 2018-11-01 RX ADMIN — PANTOPRAZOLE SODIUM 40 MG: 40 INJECTION, POWDER, FOR SOLUTION INTRAVENOUS at 23:36

## 2018-11-01 NOTE — ED NOTES
Patient had a moderate amount of brown/yellow liquid stool  Was assisted to bedside commode  Patient was unable to give a urine at this time        Darryl Capellan RN  11/01/18 2352

## 2018-11-01 NOTE — ED PROVIDER NOTES
History  Chief Complaint   Patient presents with    Vomiting     Started last night multiple times, decreased apetite, had half of a slice of toast      Back Pain     low back pain in the middle, feels sharp pain, took tylenol   Abdominal Pain     diffuse, feels bloated  21-year-old male brought by his mother (Pt is autistic) for constant periumbilical pain with some radiation to his back x2 days with multiple episodes of vomiting from 3:00 a m  Yesterday until today and 3 episodes of diarrhea  No fever chills  No dysuria or frequency  No testicular pain  Minimal nonproductive cough  No chest pain or shortness of breath        History provided by:  Patient and parent  Vomiting   Severity:  Moderate  Progression:  Worsening  Relieved by:  Nothing  Worsened by:  Nothing  Associated symptoms: abdominal pain, cough and diarrhea    Associated symptoms: no chills, no fever, no headaches, no myalgias and no sore throat    Back Pain   Associated symptoms: abdominal pain    Associated symptoms: no chest pain, no fever and no headaches    Abdominal Pain   Associated symptoms: cough, diarrhea and vomiting    Associated symptoms: no chest pain, no chills, no fever, no shortness of breath and no sore throat        Prior to Admission Medications   Prescriptions Last Dose Informant Patient Reported? Taking?    cloZAPine (CLOZARIL) 100 mg tablet   Yes No   Sig: Take 2 tablets by mouth   clomiPRAMINE (ANAFRANIL) 50 mg capsule   Yes No   Sig: Take 2 capsules by mouth   fluticasone (FLONASE) 50 mcg/act nasal spray Past Month at Unknown time  No Yes   Si spray into each nostril daily   Patient not taking: Reported on 2018    loratadine (CLARITIN) 10 mg tablet Past Month at Unknown time  No Yes   Sig: Take 1 tablet (10 mg total) by mouth daily      Facility-Administered Medications: None       Past Medical History:   Diagnosis Date    Autism     Mononucleosis     Schizophrenia [facility])        Past Surgical History:   Procedure Laterality Date    DENTAL SURGERY      TONSILLECTOMY AND ADENOIDECTOMY      Last assessed 7/8/2014       Family History   Problem Relation Age of Onset    No Known Problems Father     Cancer Family     Diabetes Family     Hyperlipidemia Family     Stroke Family      I have reviewed and agree with the history as documented  Social History   Substance Use Topics    Smoking status: Never Smoker    Smokeless tobacco: Never Used    Alcohol use No        Review of Systems   Constitutional: Negative for chills and fever  HENT: Negative for rhinorrhea and sore throat  Respiratory: Positive for cough  Negative for shortness of breath  Cardiovascular: Negative for chest pain  Gastrointestinal: Positive for abdominal pain, diarrhea and vomiting  Genitourinary: Negative for scrotal swelling and testicular pain  Musculoskeletal: Positive for back pain  Negative for myalgias  Neurological: Positive for dizziness  Negative for headaches  Psychiatric/Behavioral: Negative for confusion  All other systems reviewed and are negative  Physical Exam  Physical Exam   Constitutional: He is oriented to person, place, and time  He appears well-developed and well-nourished  HENT:   Nose: Nose normal    Mouth/Throat: No oropharyngeal exudate  Pharynx clear with slightly dry mucous membranes   Eyes: Pupils are equal, round, and reactive to light  Conjunctivae are normal  No scleral icterus  Neck: Normal range of motion  Neck supple  No JVD present  Cardiovascular: Regular rhythm and normal heart sounds  No murmur heard  Tachycardic   Pulmonary/Chest: Effort normal  No respiratory distress  He has no wheezes  He has no rales  Slightly decreased breath sounds at the right base, no definitive rales   Abdominal: Soft  There is tenderness (Tender mildly diffusely but increased tenderness periumbilically)  There is no guarding     Hypoactive bowel sounds   Genitourinary: Genitourinary Comments: No testicular swelling or tenderness   Musculoskeletal: Normal range of motion  He exhibits no edema or tenderness  Neurological: He is alert and oriented to person, place, and time  No cranial nerve deficit or sensory deficit  He exhibits normal muscle tone  5/5 motor, nl sens   Skin: Skin is warm  Diaphoretic   Psychiatric: He has a normal mood and affect  His behavior is normal    Nursing note and vitals reviewed        Vital Signs  ED Triage Vitals [11/01/18 1659]   Temperature Pulse Respirations Blood Pressure SpO2   98 7 °F (37 1 °C) (!) 148 22 149/99 97 %      Temp Source Heart Rate Source Patient Position - Orthostatic VS BP Location FiO2 (%)   Tympanic Monitor Lying Left arm --      Pain Score       8           Vitals:    11/01/18 2030 11/01/18 2048 11/01/18 2114 11/01/18 2132   BP: 148/66 138/74 137/79 140/87   Pulse: (!) 136 (!) 138 (!) 132 (!) 134   Patient Position - Orthostatic VS: Lying Lying Lying        Visual Acuity      ED Medications  Medications   sodium chloride 0 9 % bolus 3,000 mL (0 mL Intravenous Stopped 11/1/18 1947)   levofloxacin (LEVAQUIN) IVPB (premix) 750 mg (0 mg Intravenous Stopped 11/1/18 1848)   iohexol (OMNIPAQUE) 350 MG/ML injection (SINGLE-DOSE) 100 mL (80 mL Intravenous Given 11/1/18 1801)       Diagnostic Studies  Results Reviewed     Procedure Component Value Units Date/Time    Urine Microscopic [[id number]]  (Abnormal) Collected:  11/01/18 1954    Lab Status:  Final result Specimen:  Urine from Urine, Clean Catch Updated:  11/01/18 2021     RBC, UA None Seen /hpf      WBC, UA 1-2 (A) /hpf      Epithelial Cells Occasional /hpf      Bacteria, UA Occasional /hpf     UA w Reflex to Microscopic w Reflex to Culture [[id number]]  (Abnormal) Collected:  11/01/18 1954    Lab Status:  Final result Specimen:  Urine from Urine, Clean Catch Updated:  11/01/18 2007     Color, UA Yellow     Clarity, UA Clear     Specific Gravity, UA 1 010     pH, UA 6 5 Leukocytes, UA Negative     Nitrite, UA Negative     Protein, UA Trace (A) mg/dl      Glucose, UA Negative mg/dl      Ketones, UA Negative mg/dl      Urobilinogen, UA 0 2 E U /dl      Bilirubin, UA Negative     Blood, UA Negative    Lactic acid, plasma [[id number]]  (Abnormal) Collected:  11/01/18 1915    Lab Status:  Final result Specimen:  Blood from Arm, Left Updated:  11/01/18 1955     LACTIC ACID 2 2 (HH) mmol/L     Narrative:         Result may be elevated if tourniquet was used during collection  Troponin I [[id number]]  (Normal) Collected:  11/01/18 1720    Lab Status:  Final result Specimen:  Blood from Arm, Right Updated:  11/01/18 1744     Troponin I <0 03 ng/mL     Lipase [[id number]]  (Abnormal) Collected:  11/01/18 1710    Lab Status:  Final result Specimen:  Blood from Arm, Left Updated:  11/01/18 1744     Lipase <10 (L) u/L     Comprehensive metabolic panel [[id number]]  (Abnormal) Collected:  11/01/18 1710    Lab Status:  Final result Specimen:  Blood from Arm, Left Updated:  11/01/18 1744     Sodium 135 mmol/L      Potassium 3 9 mmol/L      Chloride 101 mmol/L      CO2 16 (L) mmol/L      ANION GAP 18 (H) mmol/L      BUN 27 (H) mg/dL      Creatinine 1 21 mg/dL      Glucose 137 mg/dL      Calcium 9 7 mg/dL      AST 19 U/L      ALT 47 U/L      Alkaline Phosphatase 64 U/L      Total Protein 7 5 g/dL      Albumin 4 8 g/dL      Total Bilirubin 0 90 mg/dL      eGFR 84 ml/min/1 73sq m     Narrative:         National Kidney Disease Education Program recommendations are as follows:  GFR calculation is accurate only with a steady state creatinine  Chronic Kidney disease less than 60 ml/min/1 73 sq  meters  Kidney failure less than 15 ml/min/1 73 sq  meters  Lactic Acid x2 [[id number]]  (Abnormal) Collected:  11/01/18 1713    Lab Status:  Final result Specimen:  Blood Updated:  11/01/18 1743     LACTIC ACID 3 5 (HH) mmol/L     Narrative:         Result may be elevated if tourniquet was used during collection  APTT [[id number]]  (Normal) Collected:  11/01/18 1710    Lab Status:  Final result Specimen:  Blood from Arm, Left Updated:  11/01/18 1733     PTT 36 seconds     Protime-INR [[id number]]  (Abnormal) Collected:  11/01/18 1710    Lab Status:  Final result Specimen:  Blood from Arm, Left Updated:  11/01/18 1733     Protime 13 6 (H) seconds      INR 1 18    Blood culture #2 [[id number]] Collected:  11/01/18 1719    Lab Status: In process Specimen:  Blood from Arm, Right Updated:  11/01/18 1723    CBC and differential [[id number]]  (Abnormal) Collected:  11/01/18 1710    Lab Status:  Final result Specimen:  Blood from Arm, Left Updated:  11/01/18 1723     WBC 8 10 Thousand/uL      RBC 5 62 Million/uL      Hemoglobin 17 4 (H) g/dL      Hematocrit 50 9 %      MCV 91 fL      MCH 31 0 pg      MCHC 34 2 g/dL      RDW 13 4 %      MPV 8 3 (L) fL      Platelets 222 Thousands/uL      nRBC 0 /100 WBCs      Neutrophils Relative 73 %      Lymphocytes Relative 11 (L) %      Monocytes Relative 17 (H) %      Eosinophils Relative 0 %      Basophils Relative 0 %      Neutrophils Absolute 5 90 Thousands/µL      Lymphocytes Absolute 0 90 Thousands/µL      Monocytes Absolute 1 30 (H) Thousand/µL      Eosinophils Absolute 0 00 Thousand/µL      Basophils Absolute 0 00 Thousands/µL     Blood culture #1 [[id number]] Collected:  11/01/18 1710    Lab Status: In process Specimen:  Blood from Arm, Left Updated:  11/01/18 1713                 CT abdomen pelvis with contrast   Final Result by Abdi Yonug (11/01 1859)   Findings most consistent with gastroenteritis with associated   moderate/severe ileus  No free air collections  Follow-up plain   radiographs may be useful to evaluate the bowel gas pattern  Signed by Abdi Young MD      XR chest 1 view portable   Final Result by Radha Joseph (11/01 1818)   Moderate/marked underaeration probably accounting for minimal bibasilar   subsegmental atelectasis             Signed by Ana Villavicencio MD      XR abdomen 1 view kub   Final Result by Bryson Li (11/01 1811)   Intestinal ileus suspected  Distal colonic obstruction not absolutely   excluded  Obstruction series would be helpful  Signed by Cris Salas MD                 Procedures  Procedures       Phone Contacts  ED Phone Contact    ED Course  ED Course as of Nov 02 0733   Thu Nov 01, 2018   1703 EKG ST 150bpm, nl QRS, NSSTTW changes    1718 MDM:  Pt austistic but able to give Hx along with mother  +Abd pain with tachycardia to 150bpm   Probable sepsis with volume depletion, possible acute intra-abdominal process such as Appy  Sepsis work-up initiated incl IVF and levaquin    Will obtain CXR/KUB to r/o free air or PNA    1903 Case signed over to Dr Malik Malik - will require transfer to [facility] for continued hydration, r/o early sepsis                                MDM  CritCare Time    Disposition  Final diagnoses:   Acute abdominal pain   Vomiting   Volume depletion   Tachycardia     Time reflects when diagnosis was documented in both MDM as applicable and the Disposition within this note     Time User Action Codes Description Comment    11/1/2018  6:28 PM Areta Greening A Add [R10 9] Acute abdominal pain     11/1/2018  6:28 PM Baliey Coca Add [R11 10] Vomiting     11/1/2018  6:28 PM Bailey Coca Add [E86 9] Volume depletion     11/1/2018  6:28 PM Bailey Coca Add [R00 0] Tachycardia       ED Disposition     ED Disposition Condition Comment    Transfer to Another [address] [place] [address] should be transferred out to [facility]      MD Documentation      Most Recent Value   Patient Condition  The patient has been stabilized such that within reasonable medical probability, no material deterioration of the patient condition or the condition of the unborn child(marva) is likely to result from the transfer, An emergency transfer is being made prior to stabilization due to the need for definitive care and the benefit of transfer outweighs the risk Reason for Transfer  Level of Care needed not available at this facility, No bed available at level of patient's needs   Risks of Transfer  Potential for delay in receiving treatment, Potential deterioration of medical condition, Loss of IV, Increased discomfort during transfer, Possible worsening of condition or death during transfer   Accepting Physician  Dr Andriy Rudolph Name, [facility]   Sending MD Dr Aneudy Nayak   Provider Certification  General risk, such as traffic hazards, adverse weather conditions, rough terrain or turbulence, possible failure of equipment (including vehicle or aircraft), or consequences of actions of persons outside the control of the transport personnel      RN Documentation      Most [address] [place] Name, [facility]   Bed Assignment  111-1   Report Given to  THE [facility] RN   Medications Reviewed with Next Provider of Service  Yes   Transport Mode  Ambulance   Level of Care  Advanced life support   Copies of Medical Records Sent  History and Physical, Progress note, Transfer form, Nursing note   Patient Belongings Disposition  Sent with family   Transfer Date  11/01/18   Transfer Time  2135      Follow-up Information    None         Discharge Medication List as of 11/1/2018  9:44 PM      CONTINUE these medications which have NOT CHANGED    Details   fluticasone (FLONASE) 50 mcg/act nasal spray 1 spray into each nostril daily, Starting Tue 10/9/2018, Normal      loratadine (CLARITIN) 10 mg tablet Take 1 tablet (10 mg total) by mouth daily, Starting Tue 10/9/2018, Normal      clomiPRAMINE (ANAFRANIL) 50 mg capsule Take 2 capsules by mouth, Historical Med      cloZAPine (CLOZARIL) 100 mg tablet Take 2 tablets by mouth, Starting Tue 7/8/2014, Historical Med           No discharge procedures on file      ED Provider  Electronically Signed by           Alexandrea Preston MD  11/02/18 5968

## 2018-11-02 ENCOUNTER — APPOINTMENT (INPATIENT)
Dept: RADIOLOGY | Facility: HOSPITAL | Age: 23
DRG: 389 | End: 2018-11-02
Payer: COMMERCIAL

## 2018-11-02 LAB
ALBUMIN SERPL BCP-MCNC: 4.1 G/DL (ref 3.5–5.7)
ALP SERPL-CCNC: 42 U/L (ref 40–150)
ALT SERPL W P-5'-P-CCNC: 31 U/L (ref 7–52)
ANION GAP SERPL CALCULATED.3IONS-SCNC: 12 MMOL/L (ref 4–13)
AST SERPL W P-5'-P-CCNC: 13 U/L (ref 13–39)
ATRIAL RATE: 147 BPM
BASOPHILS # BLD AUTO: 0 THOUSANDS/ΜL (ref 0–0.1)
BASOPHILS NFR BLD AUTO: 0 % (ref 0–2)
BILIRUB SERPL-MCNC: 0.7 MG/DL (ref 0.2–1)
BUN SERPL-MCNC: 17 MG/DL (ref 7–25)
CALCIUM SERPL-MCNC: 8.8 MG/DL (ref 8.6–10.5)
CHLORIDE SERPL-SCNC: 106 MMOL/L (ref 98–107)
CO2 SERPL-SCNC: 20 MMOL/L (ref 21–31)
CREAT SERPL-MCNC: 0.82 MG/DL (ref 0.7–1.3)
EOSINOPHIL # BLD AUTO: 0 THOUSAND/ΜL (ref 0–0.61)
EOSINOPHIL NFR BLD AUTO: 0 % (ref 0–5)
ERYTHROCYTE [DISTWIDTH] IN BLOOD BY AUTOMATED COUNT: 13.4 % (ref 11.5–14.5)
GFR SERPL CREATININE-BSD FRML MDRD: 126 ML/MIN/1.73SQ M
GLUCOSE SERPL-MCNC: 102 MG/DL (ref 65–99)
HCT VFR BLD AUTO: 42.6 % (ref 36.5–49.3)
HGB BLD-MCNC: 14.8 G/DL (ref 14–18)
INR PPP: 1.29 (ref 0.9–1.5)
LACTATE SERPL-SCNC: 1.3 MMOL/L (ref 0.5–2)
LYMPHOCYTES # BLD AUTO: 1 THOUSANDS/ΜL (ref 0.6–4.47)
LYMPHOCYTES NFR BLD AUTO: 20 % (ref 21–51)
MAGNESIUM SERPL-MCNC: 1.6 MG/DL (ref 1.9–2.7)
MCH RBC QN AUTO: 31.8 PG (ref 26–34)
MCHC RBC AUTO-ENTMCNC: 34.7 G/DL (ref 31–37)
MCV RBC AUTO: 92 FL (ref 81–99)
MONOCYTES # BLD AUTO: 1 THOUSAND/ΜL (ref 0.17–1.22)
MONOCYTES NFR BLD AUTO: 20 % (ref 2–12)
NEUTROPHILS # BLD AUTO: 2.8 THOUSANDS/ΜL (ref 1.4–6.5)
NEUTS SEG NFR BLD AUTO: 60 % (ref 42–75)
NRBC BLD AUTO-RTO: 0 /100 WBCS
P AXIS: 45 DEGREES
PLATELET # BLD AUTO: 268 THOUSANDS/UL (ref 149–390)
PMV BLD AUTO: 8.3 FL (ref 8.6–11.7)
POTASSIUM SERPL-SCNC: 3.5 MMOL/L (ref 3.5–5.5)
PR INTERVAL: 116 MS
PROT SERPL-MCNC: 6.6 G/DL (ref 6.4–8.9)
PROTHROMBIN TIME: 15 SECONDS (ref 10.1–12.9)
QRS AXIS: 55 DEGREES
QRSD INTERVAL: 86 MS
QT INTERVAL: 274 MS
QTC INTERVAL: 428 MS
RBC # BLD AUTO: 4.64 MILLION/UL (ref 4.3–5.9)
SODIUM SERPL-SCNC: 138 MMOL/L (ref 134–143)
T WAVE AXIS: 51 DEGREES
VENTRICULAR RATE: 147 BPM
WBC # BLD AUTO: 4.8 THOUSAND/UL (ref 4.8–10.8)

## 2018-11-02 PROCEDURE — 83735 ASSAY OF MAGNESIUM: CPT | Performed by: NURSE PRACTITIONER

## 2018-11-02 PROCEDURE — C9113 INJ PANTOPRAZOLE SODIUM, VIA: HCPCS | Performed by: NURSE PRACTITIONER

## 2018-11-02 PROCEDURE — 99253 IP/OBS CNSLTJ NEW/EST LOW 45: CPT | Performed by: SPECIALIST

## 2018-11-02 PROCEDURE — 97530 THERAPEUTIC ACTIVITIES: CPT

## 2018-11-02 PROCEDURE — 97163 PT EVAL HIGH COMPLEX 45 MIN: CPT

## 2018-11-02 PROCEDURE — 97167 OT EVAL HIGH COMPLEX 60 MIN: CPT

## 2018-11-02 PROCEDURE — G8987 SELF CARE CURRENT STATUS: HCPCS

## 2018-11-02 PROCEDURE — 85610 PROTHROMBIN TIME: CPT | Performed by: NURSE PRACTITIONER

## 2018-11-02 PROCEDURE — 74018 RADEX ABDOMEN 1 VIEW: CPT

## 2018-11-02 PROCEDURE — 99232 SBSQ HOSP IP/OBS MODERATE 35: CPT | Performed by: PHYSICIAN ASSISTANT

## 2018-11-02 PROCEDURE — 80053 COMPREHEN METABOLIC PANEL: CPT | Performed by: NURSE PRACTITIONER

## 2018-11-02 PROCEDURE — G8988 SELF CARE GOAL STATUS: HCPCS

## 2018-11-02 PROCEDURE — G8978 MOBILITY CURRENT STATUS: HCPCS

## 2018-11-02 PROCEDURE — 93010 ELECTROCARDIOGRAM REPORT: CPT | Performed by: INTERNAL MEDICINE

## 2018-11-02 PROCEDURE — 85025 COMPLETE CBC W/AUTO DIFF WBC: CPT | Performed by: NURSE PRACTITIONER

## 2018-11-02 PROCEDURE — G8979 MOBILITY GOAL STATUS: HCPCS

## 2018-11-02 RX ORDER — MAGNESIUM SULFATE HEPTAHYDRATE 40 MG/ML
2 INJECTION, SOLUTION INTRAVENOUS ONCE
Status: COMPLETED | OUTPATIENT
Start: 2018-11-02 | End: 2018-11-02

## 2018-11-02 RX ADMIN — PANTOPRAZOLE SODIUM 40 MG: 40 INJECTION, POWDER, FOR SOLUTION INTRAVENOUS at 08:42

## 2018-11-02 RX ADMIN — SODIUM CHLORIDE 125 ML/HR: 9 INJECTION, SOLUTION INTRAVENOUS at 13:53

## 2018-11-02 RX ADMIN — MAGNESIUM SULFATE HEPTAHYDRATE 2 G: 40 INJECTION, SOLUTION INTRAVENOUS at 13:51

## 2018-11-02 RX ADMIN — PANTOPRAZOLE SODIUM 40 MG: 40 INJECTION, POWDER, FOR SOLUTION INTRAVENOUS at 21:50

## 2018-11-02 NOTE — EMTALA/ACUTE CARE TRANSFER
[facility]  [address] [place] [id number]  [phone]  Dept: [phone]      CAUPJN TRANSFER CONSENT    NAME Christopher LUTHER 1995                              MRN [id number]    I have been informed of my rights regarding examination, treatment, and transfer   by Dr Beth att  providers found    Benefits:      Risks: Potential for delay in receiving treatment, Potential deterioration of medical condition, Loss of IV, Increased discomfort during transfer, Possible worsening of condition or death during transfer      Transfer Request   I acknowledge that my medical condition has been evaluated and explained to me by the emergency department physician or other qualified medical person and/or my attending physician who has recommended and offered to me further medical examination and treatment  I understand the Hospital's obligation with respect to the treatment and stabilization of my emergency medical condition  I nevertheless request to be transferred  I release the Hospital, the doctor, and any other persons caring for me from all responsibility or liability for any injury or ill effects that may result from my transfer and agree to accept all responsibility for the consequences of my choice to transfer, rather than receive stabilizing treatment at the Hospital  I understand that because the transfer is my request, my insurance may not provide reimbursement for the services  The Hospital will assist and direct me and my family in how to make arrangements for transfer, but the hospital is not liable for any fees charged by the transport service  In spite of this understanding, I refuse to consent to further medical examination and treatment which has been offered to me, and request transfer to  Alexia Jackson Name, [address] : [facility] [address] [facility]   I authorize the performance of emergency medical procedures and treatments upon me in both transit and upon arrival at the receiving facility  Additionally, I authorize the release of any and all medical records to the receiving facility and request they be transported with me, if possible  I authorize the performance of emergency medical procedures and treatments upon me in both transit and upon arrival at the receiving facility  Additionally, I authorize the release of any and all medical records to the receiving facility and request they be transported with me, if possible  I understand that the safest mode of transportation during a medical emergency is an ambulance and that the Hospital advocates the use of this mode of transport  Risks of traveling to the receiving facility by car, including absence of medical control, life sustaining equipment, such as oxygen, and medical personnel has been explained to me and I fully understand them  (JUVE CORRECT BOX BELOW)  [  ]  I consent to the stated transfer and to be transported by ambulance/helicopter  [  ]  I consent to the stated transfer, but refuse transportation by ambulance and accept full responsibility for my transportation by car  I understand the risks of non-ambulance transfers and I exonerate the Hospital and its staff from any deterioration in my condition that results from this refusal     X___________________________________________    DATE  18  TIME________  Signature of patient or legally responsible individual signing on patient behalf           RELATIONSHIP TO PATIENT_________________________          Provider Certification    NAME Kiana Epstein                                        [facility] 1995                              MRN [id number]    A medical screening exam was performed on the above named patient  Based on the examination:    Condition Necessitating Transfer The primary encounter diagnosis was Acute abdominal pain   Diagnoses of Vomiting, Volume depletion, and Tachycardia were also pertinent to this visit  Patient Condition: The patient has been stabilized such that within reasonable medical probability, no material deterioration of the patient condition or the condition of the unborn child(marva) is likely to result from the transfer, An emergency transfer is being made prior to stabilization due to the need for definitive care and the benefit of transfer outweighs the risk    Reason for Transfer: Level of Care needed not available at this facility, No bed available at level of patient's needs    Transfer Requirements: [address]   · Space available and qualified personnel available for treatment as acknowledged by    · Agreed to accept transfer and to provide appropriate medical treatment as acknowledged by       Dr Ken Allison  · Appropriate medical records of the examination and treatment of the patient are provided at the time of transfer   [address], [address] _______  · Transfer will be performed by qualified personnel from    and appropriate transfer equipment as required, including the use of necessary and appropriate life support measures      Provider Certification: I have examined the patient and explained the following risks and benefits of being transferred/refusing transfer to the patient/family:  General risk, such as traffic hazards, adverse weather conditions, rough terrain or turbulence, possible failure of equipment (including vehicle or aircraft), or consequences of actions of persons outside the control of the transport personnel      Based on these reasonable risks and benefits to the patient and/or the unborn child(marva), and based upon the information available at the time of the patients examination, I certify that the medical benefits reasonably to be expected from the provision of appropriate medical treatments at another medical facility outweigh the increasing risks, if any, to the individuals medical condition, and in the case of labor to the unborn child, from effecting the transfer      X____________________________________________ DATE 11/01/18        TIME_______      ORIGINAL - SEND TO MEDICAL RECORDS   COPY - SEND WITH PATIENT DURING TRANSFER

## 2018-11-02 NOTE — ASSESSMENT & PLAN NOTE
· Patient had been vomiting, and had episodes of diarrhea since yesterday  · CT of the abdomen and pelvis showed gastroenteritis  · Patient was given Levaquin 750 mg IV in the [facility] emergency department  · P r n   Zofran

## 2018-11-02 NOTE — NURSING NOTE
NG OUTPUT 1200 CC OF BROWN RETURN  CONTAINER NOW REPLACED AND NG TO LOW CONTINUOUS WALL SUCTION CONTINUES  PT SLEEPING HEARTRATE REMAINS  137/MIN AND SINUS TACH ON MONITOR  K BETO IS AWARE  IVF CONTINUE  LACTIC ACID RESULT #3 IMPROVED   MOTHER ASLEEP AT BEDSIDE

## 2018-11-02 NOTE — ASSESSMENT & PLAN NOTE
· This is an acute finding secondary to the dehydration  · We are monitoring his lactic acid levels  · They are currently trending down

## 2018-11-02 NOTE — EMTALA/ACUTE CARE TRANSFER
[facility]  [address] [place] [id number] [phone]  Dept: [phone]      [facility] TRANSFER CONSENT    NAME Griffin LUTHER 1995                              MRN [id number]    I have been informed of my rights regarding examination, treatment, and transfer   by Dr Efe Etienne MD    Benefits:      Risks: Potential for delay in receiving treatment, Potential deterioration of medical condition, Loss of IV, Increased discomfort during transfer, Possible worsening of condition or death during transfer      Transfer Request   I acknowledge that my medical condition has been evaluated and explained to me by the emergency department physician or other qualified medical person and/or my attending physician who has recommended and offered to me further medical examination and treatment  I understand the Hospital's obligation with respect to the treatment and stabilization of my emergency medical condition  I nevertheless request to be transferred  I release the Hospital, the doctor, and any other persons caring for me from all responsibility or liability for any injury or ill effects that may result from my transfer and agree to accept all responsibility for the consequences of my choice to transfer, rather than receive stabilizing treatment at the Hospital  I understand that because the transfer is my request, my insurance may not provide reimbursement for the services  The Hospital will assist and direct me and my family in how to make arrangements for transfer, but the hospital is not liable for any fees charged by the transport service  In spite of this understanding, I refuse to consent to further medical examination and treatment which has been offered to me, and request transfer to  Alexia Jackson Name, [address] : [facility] [address] [facility]   I authorize the performance of emergency medical procedures and treatments upon me in both transit and upon arrival at the receiving facility  Additionally, I authorize the release of any and all medical records to the receiving facility and request they be transported with me, if possible  I authorize the performance of emergency medical procedures and treatments upon me in both transit and upon arrival at the receiving facility  Additionally, I authorize the release of any and all medical records to the receiving facility and request they be transported with me, if possible  I understand that the safest mode of transportation during a medical emergency is an ambulance and that the Hospital advocates the use of this mode of transport  Risks of traveling to the receiving facility by car, including absence of medical control, life sustaining equipment, such as oxygen, and medical personnel has been explained to me and I fully understand them  (JUVE CORRECT BOX BELOW)  [  ]  I consent to the stated transfer and to be transported by ambulance/helicopter  [  ]  I consent to the stated transfer, but refuse transportation by ambulance and accept full responsibility for my transportation by car  I understand the risks of non-ambulance transfers and I exonerate the Hospital and its staff from any deterioration in my condition that results from this refusal     X___________________________________________    DATE  18  TIME________  Signature of patient or legally responsible individual signing on patient behalf           RELATIONSHIP TO PATIENT_________________________          Provider Certification    NAME Dl Torres                                        [facility] 1995                              MRN [id number]    A medical screening exam was performed on the above named patient  Based on the examination:    Condition Necessitating Transfer The primary encounter diagnosis was Acute abdominal pain   Diagnoses of Vomiting, Volume depletion, and Tachycardia were also pertinent to this visit  Patient Condition: The patient has been stabilized such that within reasonable medical probability, no material deterioration of the patient condition or the condition of the unborn child(marva) is likely to result from the transfer, An emergency transfer is being made prior to stabilization due to the need for definitive care and the benefit of transfer outweighs the risk    Reason for Transfer: Level of Care needed not available at this facility, No bed available at level of patient's needs    Transfer Requirements: [address]   · Space available and qualified personnel available for treatment as acknowledged by    · Agreed to accept transfer and to provide appropriate medical treatment as acknowledged by          · Appropriate medical records of the examination and treatment of the patient are provided at the time of transfer   [address] _______  · Transfer will be performed by qualified personnel from    and appropriate transfer equipment as required, including the use of necessary and appropriate life support measures      Provider Certification: I have examined the patient and explained the following risks and benefits of being transferred/refusing transfer to the patient/family:  General risk, such as traffic hazards, adverse weather conditions, rough terrain or turbulence, possible failure of equipment (including vehicle or aircraft), or consequences of actions of persons outside the control of the transport personnel      Based on these reasonable risks and benefits to the patient and/or the unborn child(marva), and based upon the information available at the time of the patients examination, I certify that the medical benefits reasonably to be expected from the provision of appropriate medical treatments at another medical facility outweigh the increasing risks, if any, to the individuals medical condition, and in the case of labor to the unborn child, from effecting the transfer      X____________________________________________ DATE 11/01/18        TIME_______      ORIGINAL - SEND TO MEDICAL RECORDS   COPY - SEND WITH PATIENT DURING TRANSFER

## 2018-11-02 NOTE — NURSING NOTE
Pt alert and orientated, hx of autism, parents at bedside most of shift  NG tube in right nare, draining green/brown liquid  Had KUB this morning, dr Antonia Avelar in to see pt  Clamp ng for 1/2 hour every 4 and ambulate  Parents are currently not at bedside, however they will be back, pt would like to ambulate with them  Advised to use call bell to notify staff parents are back  NS running per order  Pt denies any pain or nausea

## 2018-11-02 NOTE — PROGRESS NOTES
Progress Note - Eladio Still 1995, 26 y o  male MRN: [id number]    Unit/Bed#: -02 Encounter: [id number]    Primary Care Provider: Travis Kern DO   Date and time admitted to hospital: 11/1/2018  9:55 PM        * [facility])   Assessment & Plan    · Noted on CT of the abdomen and pelvis  · Surgery consult pending  · NG tube for decompression, low intermittent suction  · Make the patient NPO  · Protonix IV twice a day  · Continue pain medication  · Xray abdomen pending  Acute gastroenteritis   Assessment & Plan    · Patient had been vomiting, and had episodes of diarrhea since yesterday  · CT of the abdomen and pelvis showed gastroenteritis  · Patient was given Levaquin 750 mg IV in the [facility] emergency department  · P r n  Zofran     Lactic acidosis   Assessment & Plan    · This is an acute finding secondary to the dehydration  · resolved     Periumbilical abdominal pain   Assessment & Plan    · Acute episode  · Secondary to gastroenteritis and ileus  · Continue with pain control     Tachycardia   Assessment & Plan    · Secondary to underlying gastroenteritis and ileus    · Monitor patient on telemetry  · Continue IV fluids  · Continue pain medications     Acute dehydration   Assessment & Plan    · Secondary to volume loss with vomiting and diarrhea  · Continue IV fluids at 125ml/hr  · Patient will remain NPO     Vitamin D deficiency   Assessment & Plan    · Chronic       Chronic schizophreniform disorder ([facility] [address] )   Assessment & Plan    · Chronic  · The patient is medications will be on hold while he is NPO with the NG tube we will monitor him     Hyperlipidemia   Assessment & Plan    · Chronic  · Patient currently NPO     Depression   Assessment & Plan    · Chronic  · Resume home meds when able to take PO     Autistic disorder, active   Assessment & Plan    · Chronic  · Supportive care         VTE Prophylaxis: venodynes    Patient Centered Rounds: I have performed bedside rounds with nursing staff today  Discussions with Specialists or Other Care Team Provider: await surgery eval  Education and Discussions with Family / Patient: parents at bedside    Current Length of Stay: 1 day(s)    Current Patient Status: Inpatient   Certification Statement: The patient will continue to require additional inpatient hospital stay due to ileus with NG tube in place    Discharge Plan: depending hospital course    Code Status: Level 1 - Full Code    Subjective:   25 y/o male, lethargic, opened eyes briefly reported belly is a little better  His family reports that he has had periodic pain for a little while, but Tuesday he started with change in appetite  Yesterday he reported back pain and sunken eyes per his family  Objective:     Vitals:   Temp (24hrs), Av 7 °F (37 1 °C), Min:97 4 °F (36 3 °C), Max:99 8 °F (37 7 °C)    Temp:  [97 4 °F (36 3 °C)-99 8 °F (37 7 °C)] 98 4 °F (36 9 °C)  HR:  [131-148] 131  Resp:  [18-22] 18  BP: (117-150)/(66-99) 144/78  SpO2:  [91 %-99 %] 91 %  Body mass index is 37 26 kg/m²  Input and Output Summary (last 24 hours): Intake/Output Summary (Last 24 hours) at 18 1033  Last data filed at 18 0401   Gross per 24 hour   Intake                0 ml   Output             1700 ml   Net            -1700 ml       Physical Exam:     Physical Exam   Constitutional: He appears well-developed and well-nourished  HENT:   Head: Normocephalic and atraumatic  NG tube in place   Eyes: Right eye exhibits no discharge  Left eye exhibits no discharge  Neck: Normal range of motion  No tracheal deviation present  Cardiovascular: Normal rate and regular rhythm  Exam reveals no gallop and no friction rub  No murmur heard  tachy   Pulmonary/Chest: Effort normal and breath sounds normal  No respiratory distress  He has no wheezes  He has no rales  Abdominal: He exhibits distension  There is tenderness  Musculoskeletal: Normal range of motion   He exhibits no edema, tenderness or deformity  Neurological:   Lethargic, opens eyes briefly   Skin: Skin is warm and dry  No rash noted  No erythema  No pallor  Psychiatric: He has a normal mood and affect  His behavior is normal  Judgment and thought content normal    Nursing note and vitals reviewed  Additional Data:   Labs:    Results from last 7 days  Lab Units 11/02/18  0525   WBC Thousand/uL 4 80   HEMOGLOBIN g/dL 14 8   HEMATOCRIT % 42 6   PLATELETS Thousands/uL 268   NEUTROS PCT % 60   LYMPHS PCT % 20*   MONOS PCT % 20*   EOS PCT % 0       Results from last 7 days  Lab Units 11/02/18  0525   POTASSIUM mmol/L 3 5   CHLORIDE mmol/L 106   CO2 mmol/L 20*   BUN mg/dL 17   CREATININE mg/dL 0 82   CALCIUM mg/dL 8 8   ALK PHOS U/L 42   ALT U/L 31   AST U/L 13       Results from last 7 days  Lab Units 11/02/18  0526   INR  1 29       * I Have Reviewed All Lab Data Listed Above  * Additional Pertinent Lab Tests Reviewed: Chato  Admission  Reviewed    Imaging:  Imaging Reports Reviewed Today Include: CT reviewed, KUB pending      Last 24 Hours Medication List:     Current Facility-Administered Medications:  acetaminophen 325 mg Rectal Q4H PRN Sejal Cornejo, CRNP    magnesium sulfate 2 g Intravenous Once Ruth Mckeon PA-C    ondansetron 4 mg Intravenous Q6H PRN BRUNA BanguraNP    pantoprazole 40 mg Intravenous Q12H [facility] BRUNA BanguraNP    sodium chloride 125 mL/hr Intravenous Continuous Sejal Cornejo, CRNP Last Rate: 125 mL/hr (11/01/18 4996)        Today, Patient Was Seen By: Ruth Mckeon PA-C    ** Please Note: Dictation voice to text software may have been used in the creation of this document   **

## 2018-11-02 NOTE — ASSESSMENT & PLAN NOTE
· Noted on CT of the abdomen and pelvis  · Surgery consult pending  · NG tube for decompression, low intermittent suction  · Make the patient NPO  · Protonix IV twice a day  · Continue pain medication  · Xray abdomen pending

## 2018-11-02 NOTE — PLAN OF CARE
Problem: GASTROINTESTINAL - ADULT  Goal: Minimal or absence of nausea and/or vomiting  INTERVENTIONS:  - Administer IV fluids as ordered to ensure adequate hydration  - Maintain NPO status until nausea and vomiting are resolved  - Nasogastric tube as ordered  - Administer ordered antiemetic medications as needed  - Provide nonpharmacologic comfort measures as appropriate  - Advance diet as tolerated, if ordered  - Nutrition services referral to assist patient with adequate nutrition and appropriate food choices   Outcome: Progressing  2300 A #16 F NG TUBE WAS INSERTED  AS ORDERED AND TO LOW CONTINUOUS WALL SUCTION FOR LRG AMJAZMYNE WILCOX

## 2018-11-02 NOTE — NURSING NOTE
[facility] ED, WITH DX SEVERE ILIEUS  HE WAS PLACED IN ROOM 111-2  HE IS ALERT AND ORIENTED X4  HE HAS A HX OF AUTISM, AND SCHITZOPHRENIA, BUT IS ABLE TO TELL NURSING HIS NEEDS AND MOST OF HIS HX  TELE WAS PLACED ON PT, PER ORDERS, HEART RATE HAS BEEN 'S, SINCE HE WAS A PT IN [facility] INSTIT[facility]  HE IS SINUS TACH VIA MONITOR  HEART IS REG AND FAST  LUNGS CLEAR  NO SOB  ABD IS DISTENDED AND FIRM  PT C/O OF MID ABDOMINAL TENDERNESS  HE HAS BEEN SICK SINCE LAST Wednesday, WHEN HE PRESENTED WITH VOMITTING  HE VOIDS FREELY , BS ARE VERY HYPOACTIVE  AVANI PÉREZ IS HERE TO ASSESS PT, AND ORDERED AN NG TO LOW CONTINUOUS WALL SUCTION AFTER SPEAKING TO DR Paddy Bain  WE ARE AWAITING PT'S FAMILY TO COME, HIS PARENTS, FOR EMOTIONAL SUPPORT, BEFORE PLACING TUBE IN  LBM, WAS A SMALL AMT IN ED, AT [place]  HE RATES HIS PAIN 7/10, AND STATES IT IS CRAMPY  HE HAS NO EDEMA  POS PEDALS X2 NOTED   IV X2, WITH #20 TO RT AC AND LAC  2 RAILS OF BED UP  NPO STATUS

## 2018-11-02 NOTE — CONSULTS
Consultation - General Surgery   Robin Moy 26 y o  male MRN: [id number]  Unit/Bed#: -02 Encounter: [id number]    Assessment/Plan     Assessment:  27 yo WM with no significant PMH presenting with abdominal pain, nausea and vomiting  Symptoms began as another household member was recovering from a similar illness  Work up consistent with acute gastroenteritis  Much improved after placement of NG tube, CT scan shows dilated stomach, SB and colon without discrete transition point  Plan:  Intermittently clamp NG tube, as bowel function returns remove NG and start diet  Chlorseptic for irritation from NG tube  History of Present Illness   History, ROS and PFSH obtained from patient, records and family members  HPI:  Robin Moy is a 26 y o  male who presents with Nausea, Vomiting and Abdominal Pain of 2 day duration  3 episodes of diarrhea on day of presentation  No hematemesis or BRBPR  Symptoms much improved with placement of NG tube which has drained 1700 cc of bilious fluid  F/U obstruction series today continues to show dilated bowel  Inpatient consult to Acute Care Surgery  Consult performed by: Ubaldo Quiles ordered by: Jatinder Abbasi A          Review of Systems   Constitutional: Positive for appetite change, chills and fever  HENT: Negative  Respiratory: Negative  Cardiovascular: Negative  Gastrointestinal: Positive for abdominal distention, abdominal pain, diarrhea, nausea and vomiting  Negative for hematemesis   Endocrine: Negative  Genitourinary: Negative  Musculoskeletal: Negative  Skin: Negative  Neurological: Negative      Psychiatric/Behavioral:        Stable autism spectrum disorder issues       Historical Information   Past Medical History:   Diagnosis Date    Autism     Mononucleosis     Schizophrenia ([facility] [address] )      Past Surgical History:   Procedure Laterality Date    DENTAL SURGERY      TONSILLECTOMY AND ADENOIDECTOMY      Last assessed 2014     Social History   History   Alcohol Use No     History   Drug Use No     History   Smoking Status    Never Smoker   Smokeless Tobacco    Never Used     Family History: non-contributory    Meds/Allergies   PTA meds:   Prior to Admission Medications   Prescriptions Last Dose Informant Patient Reported? Taking?    cloZAPine (CLOZARIL) 100 mg tablet 10/31/2018 at 2100  Yes No   Sig: Take 2 tablets by mouth   clomiPRAMINE (ANAFRANIL) 50 mg capsule 10/31/2018 at 2100  Yes No   Sig: Take 2 capsules by mouth   fluticasone (FLONASE) 50 mcg/act nasal spray Not Taking at Unknown time  No No   Si spray into each nostril daily   Patient not taking: Reported on 2018    loratadine (CLARITIN) 10 mg tablet Unknown at Unknown time  No No   Sig: Take 1 tablet (10 mg total) by mouth daily      Facility-Administered Medications: None     Allergies   Allergen Reactions    Prednisone      DEPRESSION       Objective   First Vitals:   Blood Pressure: 144/80 (18)  Pulse: (!) 135 (18)  Temperature: 98 2 °F (36 8 °C) (18)  Temp Source: Tympanic (18)  Respirations: 18 (18)  Height: 5' 3" (160 cm) (18)  Weight - Scale: 95 4 kg (210 lb 5 oz) (18)  SpO2: 92 % (18)    Current Vitals:   Blood Pressure: 144/78 (18)  Pulse: (!) 131 (18)  Temperature: 98 4 °F (36 9 °C) (18)  Temp Source: Tympanic (18)  Respirations: 18 (18)  Height: 5' 3" (160 cm) (18)  Weight - Scale: 95 4 kg (210 lb 5 1 oz) (18 0600)  SpO2: 91 % (18)      Intake/Output Summary (Last 24 hours) at 18 1518  Last data filed at 18 1300   Gross per 24 hour   Intake                0 ml   Output             1700 ml   Net            -1700 ml       Invasive Devices     Peripheral Intravenous Line            Peripheral IV 18 Left Antecubital less than 1 day    Peripheral IV 11/01/18 Right Antecubital less than 1 day          Drain            NG/OG/Enteral Tube Nasogastric 16 Fr Right nares less than 1 day                Physical Exam   Constitutional: He is oriented to person, place, and time  He appears well-developed and well-nourished  HENT:   Head: Normocephalic  Eyes: Pupils are equal, round, and reactive to light  Neck: Neck supple  Cardiovascular: Normal rate, regular rhythm and normal heart sounds  Pulmonary/Chest: Effort normal and breath sounds normal    Abdominal: He exhibits distension  He exhibits no mass  There is no tenderness  There is no rebound and no guarding  Bowel sounds hypoactive   Genitourinary:   Genitourinary Comments: deferred   Musculoskeletal: Normal range of motion  Neurological: He is alert and oriented to person, place, and time  Skin: Skin is warm and dry  Psychiatric:   Rather flat affect       Lab Results:   CBC:   Lab Results   Component Value Date    WBC 4 80 11/02/2018    HGB 14 8 11/02/2018    HCT 42 6 11/02/2018    MCV 92 11/02/2018     11/02/2018    MCH 31 8 11/02/2018    MCHC 34 7 11/02/2018    RDW 13 4 11/02/2018    MPV 8 3 (L) 11/02/2018    NRBC 0 11/02/2018   , CMP:   Lab Results   Component Value Date    K 3 5 11/02/2018     11/02/2018    CO2 20 (L) 11/02/2018    BUN 17 11/02/2018    CREATININE 0 82 11/02/2018    CALCIUM 8 8 11/02/2018    AST 13 11/02/2018    ALT 31 11/02/2018    ALKPHOS 42 11/02/2018    EGFR 126 11/02/2018     Imaging: I have personally reviewed pertinent films in PACS  EKG, Pathology, and Other Studies: I have personally reviewed pertinent reports  Counseling / Coordination of Care  Total floor / unit time spent today 30 minutes  Greater than 50% of total time was spent with the patient and / or family counseling and / or coordination of care  A description of the counseling / coordination of care: discussing treatment with patient and family

## 2018-11-02 NOTE — H&P
H&P- Kodi Huynh 1995, 26 y o  male MRN: [id number]    Unit/Bed#: -01 Encounter: [id number]    Primary Care Provider: Diaz Bernardo DO   Date and time admitted to hospital: 11/1/2018  9:55 PM        Periumbilical abdominal pain   Assessment & Plan    · Acute episode  · Secondary to gastroenteritis and ileus  · Continue with pain control     * Ileus ([address] )   Assessment & Plan    · Per CT of the abdomen and pelvis  · Consult to surgery  · I personally spoke to Dr Magaly Haines this evening  · Will place an NG tube for decompression, low intermittent suction  · Make the patient NPO  · Protonix IV twice a day  · Continue pain medication     Acute gastroenteritis   Assessment & Plan    · Patient had been vomiting, and had episodes of diarrhea since yesterday  · CT of the abdomen and pelvis showed gastroenteritis  · Patient was given Levaquin 750 mg IV in the [facility] emergency department  · P r n  Zofran     Acute dehydration   Assessment & Plan    · Secondary to volume loss with vomiting and diarrhea  · Continue IV fluids at 125ml/hr  · Patient will remain NPO     Tachycardia   Assessment & Plan    · Secondary to underlying gastroenteritis and ileus  · Monitor patient on telemetry  · Continue IV fluids  · Continue pain medications     Autistic disorder, active   Assessment & Plan    · Chronic  · Stable     Chronic schizophreniform disorder (HCC)   Assessment & Plan    · Chronic  · Stable  · The patient is medications will be on hold while he is NPO with the NG tube we will monitor him     Depression   Assessment & Plan    · Chronic  · Stable     Hyperlipidemia   Assessment & Plan    · Chronic  · Stable     Vitamin D deficiency   Assessment & Plan    · Chronic  · Stable     Lactic acidosis   Assessment & Plan    · This is an acute finding secondary to the dehydration  · We are monitoring his lactic acid levels  · They are currently trending down             VTE Prophylaxis: Heparin  Code Status: Full  POLST: POLST is not applicable to this patient  Discussion with family:  Place an NG tube for stomach decompression    Anticipated Length of Stay:  Patient will be admitted on an Inpatient basis with an anticipated length of stay of  > 2 midnights  Justification for Hospital Stay:   Possible requirement of surgical intervention    Total Time for Visit, including Counseling / Coordination of Care: 70   Greater than 50% of this total time spent on direct patient counseling and coordination of care  Chief Complaint:     Abdominal pain    History of Present Illness:    Jo Kinney is a 26 y o  male who presents with abdominal pain  He [facility] to the [facility] emergency department with his parents, he has had abdominal pain since approximately 3:00 a m  on 10/31/2018  His mother states that he had vomited 3 times, and had been nauseated on and off  He has been unable to keep any food or liquids down  He also has had 3 bouts of diarrhea at home  And 1 yellow orange diarrhea episode in the emergency department  The patient has attempted to drink water and eat a piece of toast however he did vomit that back up  The patient does complain of umbilical area tenderness he rates it a 3/10  In the emergency department he was given 3 L of  normal saline solution, and Levaquin 750 mg IV x1  I did call to Dr Hussain Rivas from surgery and discussed any further requirements for a patient with an ileus  The patient was informed as well as his parents that he will be receiving an NG tube, and what all that entails  We did discuss the patient's home medications for his depression, and the parents agree that he would be NPO at this time  Review of Systems:  Review of Systems   Constitutional: Positive for appetite change and fever  HENT: Negative  Eyes: Negative  Respiratory: Negative  Cardiovascular: Negative      Gastrointestinal: Positive for abdominal distention, abdominal pain, diarrhea, nausea and vomiting  Endocrine: Negative  Genitourinary: Negative  Musculoskeletal: Negative  Skin: Negative  Allergic/Immunologic: Negative  Neurological: Negative  Hematological: Negative  Psychiatric/Behavioral: Negative  Past Medical and Surgical History:   Past Medical History:   Diagnosis Date    Autism     Mononucleosis     Schizophrenia [facility])        Past Surgical History:   Procedure Laterality Date    DENTAL SURGERY      TONSILLECTOMY AND ADENOIDECTOMY      Last assessed 7/8/2014       Meds/Allergies:  Prior to Admission medications    Medication Sig Start Date End Date Taking? Authorizing Provider   clomiPRAMINE (ANAFRANIL) 50 mg capsule Take 2 capsules by mouth    Historical Provider, MD   cloZAPine (CLOZARIL) 100 mg tablet Take 2 tablets by mouth 7/8/14   Historical Provider, MD   fluticasone (FLONASE) 50 mcg/act nasal spray 1 spray into each nostril daily 10/9/18   Darryl Morales PA-C   loratadine (CLARITIN) 10 mg tablet Take 1 tablet (10 mg total) by mouth daily 10/9/18   Darryl Morales PA-C     I have reviewed home medications with patient family member  Allergies:    Allergies   Allergen Reactions    Prednisone      DEPRESSION       Social History:  Marital Status: Single   Occupation:   Disabled  Patient Pre-hospital Living Situation:   At home with parents  Patient Pre-hospital Level of Mobility:   Full  Patient Pre-hospital Diet Restrictions:   Regular  Substance Use History:     History   Alcohol Use No     History   Smoking Status    Never Smoker   Smokeless Tobacco    Never Used     History   Drug Use No       Family History:  I have reviewed the patients family history    Physical Exam:   Vitals:   Blood Pressure: 144/80 (11/01/18 2158)  Pulse: (!) 135 (11/01/18 2158)  Temperature: 98 2 °F (36 8 °C) (11/01/18 2158)  Temp Source: Tympanic (11/01/18 2158)  Respirations: 18 (11/01/18 2158)  Height: 5' 3" (160 cm) (11/01/18 2158)  Weight - Scale: 95 4 kg (210 lb 5 oz) (11/01/18 2158)  SpO2: 92 % (11/01/18 2158)    Physical Exam   Constitutional: He is oriented to person, place, and time  He appears well-developed and well-nourished  He is cooperative  HENT:   Head: Normocephalic and atraumatic  Nose: Nose normal    Mouth/Throat: Mucous membranes are dry  Eyes: Pupils are equal, round, and reactive to light  Conjunctivae and EOM are normal    Neck: Normal range of motion and full passive range of motion without pain  Neck supple  Cardiovascular: Regular rhythm, normal heart sounds and normal pulses  Tachycardia present  Pulmonary/Chest: Effort normal and breath sounds normal    Abdominal: Soft  Bowel sounds are decreased  There is tenderness in the periumbilical area  Will be placing NG tube, to low intermittent suction, with a m abdominal KUB  Musculoskeletal: Normal range of motion  Neurological: He is alert and oriented to person, place, and time  History of autism, schizophrenia   Skin: Skin is warm and dry  Psychiatric: His speech is normal and behavior is normal  His mood appears anxious  Additional Data:   Lab Results: I have personally reviewed pertinent reports  Results from last 7 days  Lab Units 11/01/18  1710   WBC Thousand/uL 8 10   HEMOGLOBIN g/dL 17 4*   HEMATOCRIT % 50 9   PLATELETS Thousands/uL 317   NEUTROS PCT % 73   LYMPHS PCT % 11*   MONOS PCT % 17*   EOS PCT % 0       Results from last 7 days  Lab Units 11/01/18  1710   POTASSIUM mmol/L 3 9   CHLORIDE mmol/L 101   CO2 mmol/L 16*   BUN mg/dL 27*   CREATININE mg/dL 1 21   CALCIUM mg/dL 9 7   ALK PHOS U/L 64   ALT U/L 47   AST U/L 19       Results from last 7 days  Lab Units 11/01/18  1710   INR  1 18               Imaging: I have personally reviewed pertinent reports       CT abdomen pelvis with contrast   Status: Final result   PACS Images     Show images for CT abdomen pelvis with contrast   Order Report      Order Details   Order Questions     Question Answer Comment   What is the patient's sedation requirement? No Sedation    Contrast Information: IV           Reason For Exam     Abdominal pain, unspecified; tachycardic   Study Result     INDICATION:  Generalized abdominal pain with vomiting and diarrhea      ORDERING PROVIDER:  LUCIA CASTILLO      TECHNIQUE:  CT of the abdomen and pelvis was performed with intravenous  contrast   Omnipaque 350 80 mL was administered intravenously        Automated mA/kV exposure control was utilized and patient examination was  performed in strict accordance with principles of ALARA      RADIATION AMOUNT:  891 40 mGy-cm      COMPARISON:  Abdomen XR 11/1/2018      FINDINGS:   Abdomen: There is breathing artifact on the exam   Mild atelectasis in the right  lung base  The stomach is dilated and fluid-filled  The liver is  unremarkable  Gallbladder is unremarkable  No pancreatitis  Spleen is  not enlarged  The adrenal glands and kidneys demonstrate no acute  pathology  No hydronephrosis      There is no free air or lymph node enlargement  Abdominal aorta is not  aneurysmal      Pelvis: There is no bowel wall thickening  There is mild dilatation of the colon  containing both fluid and air  Moderately dilated small bowel loops  A  discrete point of bowel obstruction is not identified  Normal appendix  There is no free fluid  Lymph nodes are not enlarged  Urinary bladder is  unremarkable      Skeleton:    There are no acute fractures  No suspicious bony lesions      IMPRESSION:  Findings most consistent with gastroenteritis with associated  moderate/severe ileus  No free air collections    Follow-up plain  radiographs may be useful to evaluate the bowel gas pattern         Signed by Yovanny Albarran MD     XR abdomen 1 view kub   Status: Final result   PACS Images     Show images for XR abdomen 1 view kub   Order Report      Order Details   Order Questions     Question Answer Comment   Exam reason abdominal pain - portable please    Note:  Enter reason for exam          Reason For Exam     abdominal pain - portable please   Study Result     INDICATION:  Generalized abdomen pain  Shortness of breath      ORDERING PROVIDER:  LUCIA CASTILLO      TECHNIQUE:  Two view(s) of the abdomen      COMPARISON:  None Available      FINDINGS:    Gas throughout the large and small intestine  Small bowel appears  slightly dilated  There are no convincing calculi or abnormal  calcifications  No focal osseous abnormalities        IMPRESSION:  Intestinal ileus suspected  Distal colonic obstruction not absolutely  excluded  Obstruction series would be helpful         Signed by Latrice Faria MD     XR chest 1 view portable   Status: Final result   PACS Images     Show images for XR chest 1 view portable   Order Report      Order Details   Order Questions     Question Answer Comment   Exam reason tachycardia    Note:  Enter reason for exam          Reason For Exam     tachycardia   Study Result     INDICATION:  Tachycardia  Generalized abdomen pain  Shortness of breath      ORDERING PROVIDER:  LUCIA CASTILLO      TECHNIQUE:  Frontal chest was obtained at 17:36 hours      COMPARISON:  11/07/15 XR      FINDINGS:    The cardiomediastinal silhouette is normal in size        Moderate/marked underaeration probably accounting for minimal bibasilar  subsegmental atelectasis  There is no consolidation in either lung  There are no pleural effusions  There is no pneumothorax  No acute  osseous process        IMPRESSION:  Moderate/marked underaeration probably accounting for minimal bibasilar  subsegmental atelectasis        Signed by Latrice Faria MD         EKG, Pathology, and Other Studies Reviewed on Admission:   · EKG:     NetAccess/Epic Records Reviewed: Yes     ** Please Note: This note has been constructed using a voice recognition system   **

## 2018-11-02 NOTE — PHYSICAL THERAPY NOTE
Physical Therapy Evaluation     Patient's Name: Shekhar Chow    Admitting Diagnosis  Ileus [facility]) [K56 7]    Problem List  Patient Active Problem List   Diagnosis    Abnormal EKG    Autistic disorder, active    Depression    Eczema    Essential hypertriglyceridemia    Hyperlipidemia    Left-sided Bell's palsy    Obese    PDD (pervasive developmental disorder)    Plantar warts    Chronic schizophreniform disorder (HCC)    Sleep apnea    Vitamin D deficiency    Ileus ([facility] [address]    Acute gastroenteritis    Acute dehydration    Lactic acidosis    Tachycardia    Periumbilical abdominal pain       Past Medical History  Past Medical History:   Diagnosis Date    Autism     Mononucleosis     [facility]        Past Surgical History  Past Surgical History:   Procedure Laterality Date    DENTAL SURGERY      TONSILLECTOMY AND ADENOIDECTOMY      Last assessed 7/8/2014 11/02/18 1250   Note Type   Note type Eval/Treat   Pain Assessment   Pain Assessment 0-10   Pain Score 6   Pain Type Acute pain   Pain Location Abdomen   Pain Orientation Mid   Home Living   Type of Home House   Home Layout Performs ADLs on one level;Stairs to enter without rails; Able to live on main level with bedroom/bathroom; Two level  (3 MAN)   Bathroom Shower/Tub Tub/shower unit   Bathroom Toilet Standard   Prior Function   Level of [place] Independent with ADLs and functional mobility   Lives With [employer] Help From Family   ADL Assistance Independent   IADLs Independent   Falls in the last 6 months 0   Vocational Unemployed   Restrictions/Precautions   Weight Bearing Precautions Per Order No   Other Precautions Fall Risk;Pain; Impulsive  (Patient enacts movement before directions are given)   General   Additional Pertinent History Patient has high-level functioning Autism    Family/Caregiver Present Yes   Cognition   Overall Cognitive Status Unable to assess   Arousal/Participation Responsive   Orientation Level Oriented to person   Memory Unable to assess   Following Commands Follows one step commands with increased time or repetition   Comments ( initially very lethargic, arousal increased through session)   RUE Assessment   RUE Assessment WFL   LUE Assessment   LUE Assessment WFL   RLE Assessment   RLE Assessment X   Strength RLE   RLE Overall Strength 3/5   LLE Assessment   LLE Assessment X   Strength LLE   LLE Overall Strength 3/5   Coordination   Movements are Fluid and Coordinated 0   Coordination and Movement Description sporatic and segmental movements for bed mobility, tactile cues necessary   Bed Mobility   Rolling R 5  Supervision   Additional items Assist x 1;Bedrails; Increased time required   Rolling L 5  Supervision   Additional items Assist x 1; Increased time required; Bedrails;Verbal cues   Supine to Sit 5  Supervision   Sit to Supine 5  Supervision   Additional items Bedrails; Increased time required   Transfers   Sit to Stand 5  Supervision   Additional items Impulsive;Assist x 1;Bedrails   Stand to Sit 4  Minimal assistance   Additional items Assist x 1;Verbal cues; Impulsive   Stand pivot 4  Minimal assistance   Additional items Assist x 1   Balance   Static Sitting Fair +   Dynamic Sitting Fair -   Static Standing Fair -   Dynamic Standing Fair -   Ambulatory Zero   Endurance Deficit   Endurance Deficit Yes   Endurance Deficit Description (Reported "yes" when asked if he was tired)   Activity Tolerance   Activity Tolerance Treatment limited secondary to medical complications (Comment); Patient limited by fatigue   Nurse [address] [place] [address]    Assessment   Prognosis Fair   Problem List Decreased strength;Decreased endurance; Impaired balance;Decreased mobility; Decreased coordination;Decreased cognition; Impaired judgement   Assessment Pt is 26 y o  male seen for PT evaluation s/p admit to [address] [place] [address] on 11/1/2018 w/ Ileus ([address] )  PT consulted to assess pt's functional mobility and d/c needs   Order placed for PT eval and tx, w/ activity as tolerated order  Comorbidities affecting pt's physical performance at time of assessment include: Illeus, periumbical abdominal pain, tachycardia, lactic acidosis, dehydration, acute gastroenteritis, Autistic Disorder  PTA, pt was independent w/ all functional mobility w/ o AD, ambulates community distances and elevations, ambulates unrestricted distances and all terrain, ambulates household distances, lives in multi-level home and unemployed  Personal factors affecting pt at time of IE include: lives in 2 story house, stairs to enter home, communication issues, inability to ambulate household distances, inability to navigate community distances, inability to navigate level surfaces w/o external assistance, unable to perform dynamic tasks in community, decreased cognition, impulsivity, inability to perform IADLs, inability to perform ADLs and inability to live alone  Please find objective findings from PT assessment regarding body systems outlined above with impairments and limitations including weakness, impaired balance, decreased endurance, impaired coordination, pain, decreased activity tolerance, decreased functional mobility tolerance, impaired judgement, fall risk and decreased cognition  The following objective measures performed on IE also reveal limitations: Barthel Index: 25/100  Pt's clinical presentation is currently unstable/unpredictable  Pt to benefit from continued PT tx to address deficits as defined above and maximize level of functional independent mobility and consistency  From PT/mobility standpoint, recommendation at time of d/c would be STR pending progress in order to facilitate return to PLOF  Goals   Patient Goals Mother asked to help him get better   STG Expiration Date 11/07/18   Short Term Goal #1 1 ) Patient will complete bed mobility with MOD I for decrease need for caregiver assistance, decrease burden of care   2 ) Patient will complete transfers with supervision of 1 to decrease risk of falls, facilitate upright standing posture  3 ) BLE strength to greater than/equal to 4/5 gross musculature to increase ability to safely transfer, control descent to chair  4 ) Patient will exhibit increase static standing balance to Fair+ , for 2-3 minutes without LOB and supervision of 1 to improve activity tolerance  5 ) Patient will exhibit increase dynamic ambulatory balance to Fair with Min A x1 w/ RW for 50ft to improve ability to mobilize to toilet, chair and decrease risk for additional medical complications  6 ) Patient will exhibit good self monitoring and ability to follow 2 step commands to increase complexity of tasks and resume ADL's without LOB  LTG Expiration Date 11/12/18   Long Term Goal #1 1 ) Patient will complete bed mobility Independently for decrease need for caregiver assistance, decrease burden of care  2 ) Patient will complete transfers with [place] to decrease risk of falls, facilitate upright standing posture  3 ) BLE strength to greater than/equal to 4/5 gross musculature to increase ability to safely transfer, control descent to chair  4 ) Patient will exhibit increase static standing balance to Good , for 2-3 minutes without LOB and [place] to improve activity tolerance  5 ) Patient will exhibit increase dynamic ambulatory balance to Good with MOD I w/ RW for 150ft to improve ability to mobilize to toilet, chair and decrease risk for additional medical complications  6 ) Patient will exhibit good self monitoring and ability to follow 2 step commands to increase complexity of tasks and resume ADL's without LOB  Treatment Day 1   Plan   Treatment/Interventions Functional transfer training; Therapeutic exercise; Endurance training;Cognitive reorientation;Patient/family training;Equipment eval/education;Gait training   PT Frequency 5x/wk   Recommendation   Recommendation Short-term skilled PT   Equipment Recommended Kal Rome PT - OK to Discharge Yes  (Pending outcome of current medical status, to STR )   Additional Comments Patient was returned back to bed with head of bed elevated,and nurse contacted regarding IV   Barthel Index   Feeding 0   Bathing 0   Grooming Score 0   Dressing Score 5   Bladder Score 5   Bowels Score 5   Toilet Use Score 5   Transfers (Bed/Chair) Score 5   Mobility (Level Surface) Score 0   Stairs Score 0   Barthel Index Score 25     Additional skilled intervention: Therapeutic Activity including, bed mobility, sit to stand, and stand pivot transfer to chair       Krista Guallpa,SPT

## 2018-11-02 NOTE — PLAN OF CARE
Problem: PHYSICAL THERAPY ADULT  Goal: Performs mobility at highest level of function for planned discharge setting  See evaluation for individualized goals  Treatment/Interventions: Functional transfer training, Therapeutic exercise, Endurance training, Cognitive reorientation, Patient/family training, Equipment eval/education, Gait training  Equipment Recommended: [employer] Fang       See flowsheet documentation for full assessment, interventions and recommendations  Romaine Lambert    Prognosis: Fair  Problem List: Decreased strength, Decreased endurance, Impaired balance, Decreased mobility, Decreased coordination, Decreased cognition, Impaired judgement  Assessment: Pt is 26 y o  male seen for PT evaluation s/p admit to [address] [place] [address] on 11/1/2018 w/ Ileus ([facility] [address] )  PT consulted to assess pt's functional mobility and d/c needs  Order placed for PT eval and tx, w/ activity as tolerated order  Comorbidities affecting pt's physical performance at time of assessment include: Illeus, periumbical abdominal pain, tachycardia, lactic acidosis, dehydration, acute gastroenteritis, Autistic Disorder  PTA, pt was independent w/ all functional mobility w/ o AD, ambulates community distances and elevations, ambulates unrestricted distances and all terrain, ambulates household distances, lives in multi-level home and unemployed  Personal factors affecting pt at time of IE include: lives in 2 story house, stairs to enter home, communication issues, inability to ambulate household distances, inability to navigate community distances, inability to navigate level surfaces w/o external assistance, unable to perform dynamic tasks in community, decreased cognition, impulsivity, inability to perform IADLs, inability to perform ADLs and inability to live alone   Please find objective findings from PT assessment regarding body systems outlined above with impairments and limitations including weakness, impaired balance, decreased endurance, impaired coordination, pain, decreased activity tolerance, decreased functional mobility tolerance, impaired judgement, fall risk and decreased cognition  The following objective measures performed on IE also reveal limitations: Barthel Index: 25/100  Pt's clinical presentation is currently unstable/unpredictable  Pt to benefit from continued PT tx to address deficits as defined above and maximize level of functional independent mobility and consistency  From PT/mobility standpoint, recommendation at time of d/c would be STR pending progress in order to facilitate return to PLOF  Recommendation: Short-term skilled PT     PT - OK to Discharge: Yes (Pending outcome of current medical status, to STR )    See flowsheet documentation for full assessment     Romaine Lambert

## 2018-11-02 NOTE — ASSESSMENT & PLAN NOTE
· Secondary to underlying gastroenteritis and ileus    · Monitor patient on telemetry  · Continue IV fluids  · Continue pain medications

## 2018-11-02 NOTE — ASSESSMENT & PLAN NOTE
· Chronic  · The patient is medications will be on hold while he is NPO with the NG tube we will monitor him

## 2018-11-02 NOTE — ASSESSMENT & PLAN NOTE
· Secondary to volume loss with vomiting and diarrhea  · Continue IV fluids at 125ml/hr  · Patient will remain NPO

## 2018-11-02 NOTE — NURSING NOTE
Pt ambulated around the unit again with aide, for total of approximately 400 feet  NG tube hooked up from 1830 to 1910  Pt states feeling better  Will ambulate again prior to bedtime

## 2018-11-02 NOTE — UTILIZATION REVIEW
Initial Clinical Review    Admission: Date/Time/Statement: 11/1/18 @ 2224 INPATIENT  Patient presented at [place] ED on 11/1/18 with abdominal pain, back pain and vomiting  Transferred by EMS to [facility] for [facility]  Orders Placed This Encounter   Procedures    Inpatient Admission     Standing Status:   Standing     Number of Occurrences:   1     Order Specific Question:   Admitting Physician     Answer:   Geoffrey Sanders [[id number]]     Order Specific Question:   Level of Care     Answer:   Med Surg [16]     Order Specific Question:   Estimated length of stay     Answer:   More than 2 Midnights     Order Specific Question:   Certification     Answer:   I certify that inpatient services are medically necessary for this patient for a duration of greater than two midnights  See H&P and MD Progress Notes for additional information about the patient's course of treatment  Chief Complaint: 67-year-old male brought by his mother (Pt is autistic) for constant periumbilical pain with some radiation to his back x2 days with multiple episodes of vomiting from 3:00 a m  Yesterday until today and 3 episodes of diarrhea         History of Illness:      Vital Signs:   Temperature Pulse Respirations Blood Pressure SpO2   11/01/18 2158 11/01/18 2158 11/01/18 2158 11/01/18 2158 11/01/18 2158   98 2 °F (36 8 °C) (!) 135 18 144/80 92 %   Pain Score 7/10      11/01/18 2216        Wt Readings from Last 1 Encounters:   11/02/18 95 4 kg (210 lb 5 1 oz)       Vital Signs (abnormal):       Date/Time   Pulse    11/02/18 0754    131    11/02/18 0303    136    11/01/18 2158    135        Abnormal Labs/Diagnostic Test Results:       CT abdomen and pelvis: Findings most consistent with gastroenteritis with associated  moderate/severe ileus  Abdomen x-ray: Intestinal ileus suspected  Distal colonic obstruction not absolutely  excluded  Lactic acid = 3 5, 2 2      Past Medical/Surgical History:    Active Ambulatory Problems     Diagnosis Date Noted    Abnormal EKG 01/06/2018    Autistic disorder, active 11/25/2013    Depression 11/25/2013    Eczema 12/28/2017    Essential hypertriglyceridemia 02/01/2017    Hyperlipidemia 03/30/2015    Left-sided Bell's palsy 07/10/2015    Obese 10/05/2016    PDD (pervasive developmental disorder) 08/30/2016    Plantar warts 07/08/2014    [facility] 08/30/2016    Sleep apnea 04/08/2014    Vitamin D deficiency 04/13/2015       Past Medical History:   Diagnosis Date    Autism     Mononucleosis     Sch[facility]ia ([facility] )        Admitting Diagnosis: Ileus ([place] [address] ) [K56 7]    Age/Sex: 26 y o  male    Assessment/Plan:  26 y o  male who presented to the ED with abdominal pain  Patient is autistic  His mother states that he had vomited 3 times, and had been nauseated on and off  He has been unable to keep any food or liquids down  He also has had 3 bouts of diarrhea at home  CT imaging shows gastroenteritis and ileus  Plan: NPO, NGT to LIS, Protonix IV, pain control, consult Surgery, IV antiemetics, IV fluids at 125 mh/hour, telemetry to monitor tachycardia, monitor lactic acid levels  Admission Orders: Acute Care Surgery consult, NPO, NG tube to LCS, blood cultures, BMP, Mag and Phos in a m , Lactic acid Q2H, continuous cardiac monitoring, PT eval and treat, ambulate patient  Scheduled Meds:   Current Facility-Administered Medications:  acetaminophen 325 mg Rectal Q4H PRN   magnesium sulfate 2 g Intravenous Once   ondansetron 4 mg Intravenous Q6H PRN   pantoprazole 40 mg Intravenous Q12H [facility]     Continuous Infusions:   sodium chloride 125 mL/hr Last Rate: 125 mL/hr (11/01/18 2335)               Thank you,  Felipe Kevin Utilization Review Department  Phone: [phone];  Fax [phone]  ATTENTION: Please call with any questions or concerns to [phone]  and carefully follow the prompts so that you are directed to the right person  Send all requests for admission clinical reviews, approved or denied determinations and any other requests to fax [phone]   All voicemails are confidential

## 2018-11-02 NOTE — ED NOTES
To [facility] room 111 1 report to be called to [phone]  Admitting Dr Anca Mitchell   [facility]FFILI[facility] WITH [facility] ambulance to provide transportation approximately 2100       Mary Johnson RN  11/01/18 2032

## 2018-11-02 NOTE — ASSESSMENT & PLAN NOTE
· Per CT of the abdomen and pelvis  · Consult to surgery  · I personally spoke to Dr Krystina Malcolm this evening  · Will place an NG tube for decompression, low intermittent suction  · Make the patient NPO  · Protonix IV twice a day  · Continue pain medication

## 2018-11-02 NOTE — NURSING NOTE
PT'S PARENTS ARRIVED IN ROOM AND WERE EDUCATED BY NURSING REGARDING NG PROCEDURE  ONLY PT'S MOTHER WILL REMAIN WITH PT AT BEDSIDE DURING PROCEDURE, HIS FATHER IS GOING HOME  PT WAS GIVEN 0 5 MG IV ATIVAN, TO LAC  IV NSS  TO LAC IS ABSORBING 125/HR TO LAC  PT WAS PLACED IN A HIGH FOWLERS POSITION IN BED  A ROBB PURCELL, #16F NG, WAS INSERTED, TO RT NARES, WITH NO COMPLICATION  IT WAS ADVANCED WITH EASE, TO CORRECT MARKING AND WAS VERIFIED FOR POSITIVE PLACEMENT, WITH KNOWN GASTRIC BROWN RETURN  IT WAS CONNECTED TO LOW CONTINUOUS WALL SUCTION, AND IS DRAINING LARGE AMTS OF BROWN LIQUID RETURN  PT DANN PROCEDURE WELL  HE WAS LOWERED TO SEMIFOWLERS IN BED  HIS NG, WAS SECURED WITH TAPE, TO HIS NARES

## 2018-11-02 NOTE — SOCIAL WORK
Chart reviewed by case management , assessment was completed at the bedside with the pt and parents present, pt went for kub today and has a ng tube in place, pt lives with his parents in a 2story home, br on 1st & 2nd floor, pt  Stays on the first floor, family will transport the pt home when stable for d/c , pt has an outpt cm Jadiel from [facility], [facility], pt has a rx plan at [facility] in [place], pt and family deny any d/c needs at this time, cm will continue to follow and assess for any additional d/c needs, d/c plan was d/c at d/c planning meeting today, CM reviewed d/c planning process including the following: identifying help at home, patient preference for d/c planning needs, availability of treatment team to discuss questions or concerns patient and/or family may have regarding understanding medications and recognizing signs and symptoms once discharged  CM also encouraged patient to follow up with all recommended appointments after discharge  Patient advised of importance for patient and family to participate in managing patients medical well being

## 2018-11-02 NOTE — OCCUPATIONAL THERAPY NOTE
Occupational Therapy Evaluation      Cristiane Valencia    11/2/2018    Patient Active Problem List   Diagnosis    Abnormal EKG    Autistic disorder, active    Depression    Eczema    Essential hypertriglyceridemia    Hyperlipidemia    Left-sided Bell's palsy    Obese    PDD (pervasive developmental disorder)    Plantar warts    Chronic schizophreniform disorder (HCC)    Sleep apnea    Vitamin D deficiency    Ileus (HCC)    Acute gastroenteritis    Acute dehydration    Lactic acidosis    Tachycardia    Periumbilical abdominal pain       Past Medical History:   Diagnosis Date    Autism     Mononucleosis     Schizophrenia ([facility] [address] )        Past Surgical History:   Procedure Laterality Date    DENTAL SURGERY      TONSILLECTOMY AND ADENOIDECTOMY      Last assessed 7/8/2014 11/02/18 1249   Note Type   Note type Eval/Treat   Restrictions/Precautions   Weight Bearing Precautions Per Order No   Other Precautions Pain;Multiple lines; Fall Risk  (mother present during eval states "he's high level autistic")   Pain Assessment   Pain Assessment 0-10   Pain Type Acute pain   Pain Location Abdomen   Pain Orientation Mid   Pain Descriptors Aching   Pain Frequency Constant/continuous   Pain Onset Ongoing   Clinical Progression Not changed   Prior Function   Level of [place] Independent with ADLs and functional mobility   Lives With Family   Receives Help From Family   ADL Assistance Independent   IADLs Independent   Falls in the last 6 months 0   Vocational Unemployed   Psychosocial   Patient Behaviors/Mood Appropriate for situation; Cooperative   Psychosocial Additional Assessments (expressive communication difficulties)   Ability to Express Needs Needs assistance   ADL   Where Assessed Supine, bed   Eating Assistance Unable to assess  (npo)   UB Dressing Assistance 2  Maximal Assistance   LB Dressing Assistance 2  Maximal [address]  3  Moderate Assistance   Toileting Deficit Steadying;Supervison/safety   Bed Mobility   Rolling R 5  Supervision   Additional items Bedrails  (VC needed for technique)   Rolling L 5  Supervision   Additional items Bedrails  (VC for technique)   Supine to Sit 5  Supervision   Additional items Bedrails  (VC for technqiue)   Sit to Supine 5  Supervision   Additional items Bedrails  (VC for technqiue)   Transfers   Sit to Stand 4  Minimal assistance   Additional items Assist x 1   Stand to Sit 4  Minimal assistance   Additional items Assist x 1   Stand pivot 4  Minimal assistance   Additional items Assist x 1;Verbal cues   Balance   Static Sitting Good   Dynamic Sitting Fair   Static Standing Fair   Dynamic Standing Fair -   Activity Tolerance   Activity Tolerance Treatment limited secondary to medical complications (Comment)   RUE Assessment   RUE Assessment WFL   LUE Assessment   LUE Assessment WFL   Cognition   Overall Cognitive Status Unable to assess   Arousal/Participation Lethargic  (increased arousal as eval progressed given verbal cueing)   Orientation Level Oriented to person  (unable to assess orientation completely due to lethargy)   Following Commands Follows one step commands with increased time or repetition   Assessment   Limitation Decreased ADL status; Decreased endurance;Decreased Safe judgement during ADL   Assessment Pt is a 26 y o  male seen for OT evaluation s/p admit to [facility] on 11/1/2018 w/ Ileus ([facility] [address] )  Comorbidities affecting pt's functional performance at time of assessment include: autism with decreased communication  Personal factors affecting pt at time of IE include:difficulty performing ADLS, difficulty performing IADLS , limited insight into deficits and decreased initiation and engagement   Prior to admission, pt was independent with ADL's and functional mobility without AD or DME   Upon evaluation: Pt requires extensive assist with ADL's and mobility 2* the following deficits impacting occupational performance: weakness, decreased balance, decreased tolerance, impaired arousal and decreased safety awareness  Pt to benefit from continued skilled OT tx while in the hospital to address deficits as defined above and maximize level of functional independence w ADL's and functional mobility  Occupational Performance areas to address include: bathing/shower, toilet hygiene, dressing, functional mobility and clothing management  From OT standpoint, recommendation at time of d/c would be STR  Goals   Patient Goals Mother states " get him better and stronger"   Short Term Goal #1 Improve active particpation in self care tasks with minimal ceuing for improved functional performance     Functional Transfer Goals   Pt Will Perform All Functional Transfers With safety/supervision   ADL Goals   Pt Will Perform Grooming Independently   Pt Will Perform UE Dressing With setup   Pt Will Perform LE Dressing With min assist   Pt Will Perform Toileting With stand by assist   Barthel Index   Feeding 0   Bathing 0   Grooming Score 0   Dressing Score 5   Bladder Score 5   Bowels Score 5   Toilet Use Score 5   Transfers (Bed/Chair) Score 5   Mobility (Level Surface) Score 0   Stairs Score 0   Barthel Index Score 25   Patrice Lorenzo, OT

## 2018-11-02 NOTE — PLAN OF CARE
Problem: OCCUPATIONAL THERAPY ADULT  Goal: Performs self-care activities at highest level of function for planned discharge setting  See evaluation for individualized goals  Limitation: Decreased ADL status, Decreased endurance, Decreased Safe judgement during ADL     Assessment: Pt is a 26 y o  male seen for OT evaluation s/p admit to [address] on 11/1/2018 w/ Ileus ([address] )  Comorbidities affecting pt's functional performance at time of assessment include: autism with decreased communication  Personal factors affecting pt at time of IE include:difficulty performing ADLS, difficulty performing IADLS , limited insight into deficits and decreased initiation and engagement   Prior to admission, pt was independent with ADL's and functional mobility without AD or DME  Upon evaluation: Pt requires extensive assist with ADL's and mobility 2* the following deficits impacting occupational performance: weakness, decreased balance, decreased tolerance, impaired arousal and decreased safety awareness  Pt to benefit from continued skilled OT tx while in the hospital to address deficits as defined above and maximize level of functional independence w ADL's and functional mobility  Occupational Performance areas to address include: bathing/shower, toilet hygiene, dressing, functional mobility and clothing management  From OT standpoint, recommendation at time of d/c would be STR             Patrice Lorenzo OT

## 2018-11-02 NOTE — ASSESSMENT & PLAN NOTE
· Chronic  · Stable  · The patient is medications will be on hold while he is NPO with the NG tube we will monitor him

## 2018-11-02 NOTE — PLAN OF CARE
Problem: DISCHARGE PLANNING - CARE MANAGEMENT  Goal: Discharge to post-acute care or home with appropriate resources  INTERVENTIONS:  - Conduct assessment to determine patient/family and health care team treatment goals, and need for post-acute services based on payer coverage, community resources, and patient preferences, and barriers to discharge  - Address psychosocial, clinical, and financial barriers to discharge as identified in assessment in conjunction with the patient/family and health care team  - Arrange appropriate level of post-acute services according to patient's   needs and preference and payer coverage in collaboration with the physician and health care team  - Communicate with and update the patient/family, physician, and health care team regarding progress on the discharge plan  - Arrange appropriate transportation to post-acute venues      D/c home with family when stable  Outcome: Progressing

## 2018-11-02 NOTE — ASSESSMENT & PLAN NOTE
· Patient had been vomiting, and had episodes of diarrhea since yesterday  · CT of the abdomen and pelvis showed gastroenteritis  · Patient was given Levaquin 750 mg IV in the [place] emergency department  · KENISHA Del Rosarioan

## 2018-11-03 ENCOUNTER — APPOINTMENT (INPATIENT)
Dept: RADIOLOGY | Facility: HOSPITAL | Age: 23
DRG: 389 | End: 2018-11-03
Payer: COMMERCIAL

## 2018-11-03 PROBLEM — E87.6 HYPOKALEMIA: Status: ACTIVE | Noted: 2018-11-03

## 2018-11-03 PROBLEM — E83.39 HYPOPHOSPHATEMIA: Status: ACTIVE | Noted: 2018-11-03

## 2018-11-03 PROBLEM — E83.42 HYPOMAGNESEMIA: Status: ACTIVE | Noted: 2018-11-03

## 2018-11-03 LAB
ANION GAP SERPL CALCULATED.3IONS-SCNC: 9 MMOL/L (ref 4–13)
BUN SERPL-MCNC: 14 MG/DL (ref 7–25)
CALCIUM SERPL-MCNC: 8.8 MG/DL (ref 8.6–10.5)
CHLORIDE SERPL-SCNC: 108 MMOL/L (ref 98–107)
CO2 SERPL-SCNC: 24 MMOL/L (ref 21–31)
CREAT SERPL-MCNC: 0.77 MG/DL (ref 0.7–1.3)
GFR SERPL CREATININE-BSD FRML MDRD: 129 ML/MIN/1.73SQ M
GLUCOSE SERPL-MCNC: 89 MG/DL (ref 65–99)
MAGNESIUM SERPL-MCNC: 2.2 MG/DL (ref 1.9–2.7)
PHOSPHATE SERPL-MCNC: 1.5 MG/DL (ref 3–5.5)
POTASSIUM SERPL-SCNC: 3.4 MMOL/L (ref 3.5–5.5)
SODIUM SERPL-SCNC: 141 MMOL/L (ref 134–143)

## 2018-11-03 PROCEDURE — 83735 ASSAY OF MAGNESIUM: CPT | Performed by: PHYSICIAN ASSISTANT

## 2018-11-03 PROCEDURE — 99231 SBSQ HOSP IP/OBS SF/LOW 25: CPT | Performed by: SPECIALIST

## 2018-11-03 PROCEDURE — 74018 RADEX ABDOMEN 1 VIEW: CPT

## 2018-11-03 PROCEDURE — 84100 ASSAY OF PHOSPHORUS: CPT | Performed by: PHYSICIAN ASSISTANT

## 2018-11-03 PROCEDURE — 99232 SBSQ HOSP IP/OBS MODERATE 35: CPT | Performed by: PHYSICIAN ASSISTANT

## 2018-11-03 PROCEDURE — 80048 BASIC METABOLIC PNL TOTAL CA: CPT | Performed by: PHYSICIAN ASSISTANT

## 2018-11-03 PROCEDURE — C9113 INJ PANTOPRAZOLE SODIUM, VIA: HCPCS | Performed by: NURSE PRACTITIONER

## 2018-11-03 RX ORDER — CLOZAPINE 100 MG/1
200 TABLET ORAL
Status: DISCONTINUED | OUTPATIENT
Start: 2018-11-03 | End: 2018-11-03

## 2018-11-03 RX ORDER — CLOMIPRAMINE HYDROCHLORIDE 25 MG/1
100 CAPSULE ORAL
Status: DISCONTINUED | OUTPATIENT
Start: 2018-11-03 | End: 2018-11-05 | Stop reason: HOSPADM

## 2018-11-03 RX ORDER — CLOZAPINE 25 MG/1
50 TABLET ORAL
Status: CANCELLED | OUTPATIENT
Start: 2018-11-03

## 2018-11-03 RX ORDER — CLOZAPINE 100 MG/1
100 TABLET ORAL
Status: DISCONTINUED | OUTPATIENT
Start: 2018-11-03 | End: 2018-11-05 | Stop reason: HOSPADM

## 2018-11-03 RX ORDER — CLOZAPINE 100 MG/1
100 TABLET ORAL
Status: DISCONTINUED | OUTPATIENT
Start: 2018-11-04 | End: 2018-11-05 | Stop reason: HOSPADM

## 2018-11-03 RX ADMIN — PANTOPRAZOLE SODIUM 40 MG: 40 INJECTION, POWDER, FOR SOLUTION INTRAVENOUS at 08:35

## 2018-11-03 RX ADMIN — SODIUM CHLORIDE 125 ML/HR: 9 INJECTION, SOLUTION INTRAVENOUS at 03:26

## 2018-11-03 RX ADMIN — POTASSIUM PHOSPHATE, MONOBASIC AND POTASSIUM PHOSPHATE, DIBASIC 21 MMOL: 224; 236 INJECTION, SOLUTION, CONCENTRATE INTRAVENOUS at 11:10

## 2018-11-03 RX ADMIN — PANTOPRAZOLE SODIUM 40 MG: 40 INJECTION, POWDER, FOR SOLUTION INTRAVENOUS at 20:21

## 2018-11-03 RX ADMIN — SODIUM CHLORIDE 125 ML/HR: 9 INJECTION, SOLUTION INTRAVENOUS at 11:16

## 2018-11-03 RX ADMIN — ACETAMINOPHEN 325 MG: 650 SUPPOSITORY RECTAL at 11:10

## 2018-11-03 RX ADMIN — CLOZAPINE 100 MG: 100 TABLET ORAL at 22:03

## 2018-11-03 RX ADMIN — CLOMIPRAMINE HYDROCHLORIDE 100 MG: 25 CAPSULE ORAL at 22:03

## 2018-11-03 NOTE — NURSING NOTE
No changes in the pts assess throughout the day, pt has been tolerating the ngt being clamped for 4 hours at a time with half hour to suction, no c/o pain nausea and or vomiting at this time, family remains at the pts bedside, pt has been oob to his chair and ambulating in the pete a few times today and the pt is tolerating well, pt went down to xray and back and tolerated well, presently in bed in no acute distress watching tv and visiting with his family, callbell in reach of the pt, encouraged to ring with any needs and the pt agrees

## 2018-11-03 NOTE — PROGRESS NOTES
Progress Note - General Surgery   Unknown Gold 26 y o  male MRN: [id number]  Unit/Bed#: -02 Encounter: [id number]    Assessment:  Resting Quietly, has been having colicky intermittent RUQ pain  Tolerating intermittent clamping of NG tube  No effective flatus or BM  Plan:  Encourage OOB, begin trickle feeds to stimulate GI Tract    Subjective/Objective   Chief Complaint: Colicky Right sided abdominal pain    Subjective: No retching with tube clamped, output from the tube fairly minimal, appears frustrated  Objective:     Blood pressure 134/89, pulse (!) 119, temperature 98 4 °F (36 9 °C), temperature source Temporal, resp  rate 18, height 5' 3" (1 6 m), weight 93 4 kg (206 lb), SpO2 95 %  ,Body mass index is 36 49 kg/m²  Intake/Output Summary (Last 24 hours) at 11/03/18 1705  Last data filed at 11/03/18 1635   Gross per 24 hour   Intake              250 ml   Output              675 ml   Net             -425 ml       Invasive Devices     Peripheral Intravenous Line            Peripheral IV 11/01/18 Left Antecubital 2 days    Peripheral IV 11/01/18 Right Antecubital 1 day          Drain            NG/OG/Enteral Tube Nasogastric 16 Fr Right nares 1 day                Physical Exam:   Resting Quietly  Abdomen softer, marginally less distended  Minimal tenderness with palpation  Bowel sounds hypoactive      Lab, Imaging and other studies:  CBC: No results found for: WBC, HGB, HCT, MCV, PLT, ADJUSTEDWBC, MCH, MCHC, RDW, MPV, NRBC, CMP:   Lab Results   Component Value Date    K 3 4 (L) 11/03/2018     (H) 11/03/2018    CO2 24 11/03/2018    BUN 14 11/03/2018    CREATININE 0 77 11/03/2018    CALCIUM 8 8 11/03/2018    EGFR 129 11/03/2018     VTE Pharmacologic Prophylaxis: Enoxaparin (Lovenox)  VTE Mechanical Prophylaxis: sequential compression device

## 2018-11-03 NOTE — PROGRESS NOTES
Progress Note - Kiana Redo 1995, 26 y o  male MRN: [id number]    Unit/Bed#: -02 Encounter: [id number]    Primary Care Provider: Lester Solo DO   Date and time admitted to hospital: 11/1/2018  9:55 PM    * [facility]le[facility])   Assessment & Plan    · Noted on CT of the abdomen and pelvis  · Surgery following  · NG tube for currently 4 hour clamp and 1/2 hour suction with 25cc out per nurse on last suction  · continue NPO and IVF  · Protonix IV twice a day       Acute gastroenteritis   Assessment & Plan    · Patient had been vomiting, and had episodes of diarrhea with sick contacts at home  · CT of the abdomen and pelvis showed gastroenteritis  · Patient was given Levaquin 750 mg IV in the [facility] emergency department  · P r n   Zofran     Lactic acidosis   Assessment & Plan    · This was an acute finding   · resolved     Hypokalemia   Assessment & Plan    · Replete and monitor     Hypomagnesemia   Assessment & Plan    · resolved     Hypophosphatemia   Assessment & Plan    · replete     Periumbilical abdominal pain   Assessment & Plan    · Acute episode  · Secondary to gastroenteritis and ileus  · Continue with pain control  · improving     Tachycardia   Assessment & Plan    · Appears sinus tach is chronic noted in records for over 1 year on review of epic charts  · Follow up OP  · Patient denies CP     Acute dehydration   Assessment & Plan    · Secondary to volume loss with vomiting and diarrhea  · Continue IV fluids at 125ml/hr  · Patient will remain NPO     Vitamin D deficiency   Assessment & Plan    · Chronic       Chronic schizophreniform disorder (HCC)   Assessment & Plan    · Chronic  · The patient is medications will be on hold while he is NPO with the NG tube we will monitor him     Hyperlipidemia   Assessment & Plan    · Chronic  · Patient currently NPO     Depression   Assessment & Plan    · Chronic  · Resume home meds when able to take PO     Autistic disorder, active   Assessment & Plan · Chronic  · Supportive care       VTE Prophylaxis: venodynes  Patient Centered Rounds: I have performed bedside rounds with nursing staff today  Discussions with Specialists or Other Care Team Provider: Case Management    Current Length of Stay: 2 day(s)    Current Patient Status: Inpatient   Certification Statement: The patient will continue to require additional inpatient hospital stay due to NG tube, intermittent suction, NPO currently    Discharge Plan: when medically stable    Code Status: Level 1 - Full Code    Subjective:   25 y/o male w/ report improved abdominal pain but still some on right side, passing flatus  Nursing reported 25cc when last suction occurred  He will be hooking him up soon for suction  Patient unsure that last time he was able to take and keep down his medications  Discussed ambulating in halls when off suction  Objective:     Vitals:   Temp (24hrs), Av 5 °F (37 5 °C), Min:99 °F (37 2 °C), Max:100 4 °F (38 °C)    Temp:  [99 °F (37 2 °C)-100 4 °F (38 °C)] 99 °F (37 2 °C)  HR:  [121-133] 121  Resp:  [16-18] 18  BP: (108-148)/(60-82) 142/82  SpO2:  [87 %-95 %] 90 %  Body mass index is 36 49 kg/m²  Input and Output Summary (last 24 hours): Intake/Output Summary (Last 24 hours) at 18 1121  Last data filed at 18 1529   Gross per 24 hour   Intake                0 ml   Output              500 ml   Net             -500 ml       Physical Exam:     Physical Exam   Constitutional: He appears well-developed and well-nourished  HENT:   Head: Normocephalic and atraumatic  NG tube in place   Eyes: Conjunctivae and EOM are normal  Right eye exhibits no discharge  Left eye exhibits no discharge  Neck: Normal range of motion  No tracheal deviation present  Cardiovascular: Regular rhythm and normal heart sounds  Exam reveals no gallop and no friction rub  No murmur heard    tachy   Pulmonary/Chest: Effort normal and breath sounds normal  No respiratory distress  He has no wheezes  He has no rales  Abdominal: Soft  He exhibits no distension  There is tenderness (mild )  There is no rebound and no guarding  Musculoskeletal: Normal range of motion  He exhibits no edema, tenderness or deformity  Neurological:   Alert, speech intact, moving extremities   Skin: Skin is warm and dry  No rash noted  No erythema  No pallor  Psychiatric: He has a normal mood and affect  His behavior is normal  Judgment and thought content normal    Nursing note and vitals reviewed  Additional Data:   Labs:    Results from last 7 days  Lab Units 11/02/18  0525   WBC Thousand/uL 4 80   HEMOGLOBIN g/dL 14 8   HEMATOCRIT % 42 6   PLATELETS Thousands/uL 268   NEUTROS PCT % 60   LYMPHS PCT % 20*   MONOS PCT % 20*   EOS PCT % 0       Results from last 7 days  Lab Units 11/03/18  0511 11/02/18  0525   POTASSIUM mmol/L 3 4* 3 5   CHLORIDE mmol/L 108* 106   CO2 mmol/L 24 20*   BUN mg/dL 14 17   CREATININE mg/dL 0 77 0 82   CALCIUM mg/dL 8 8 8 8   ALK PHOS U/L  --  42   ALT U/L  --  31   AST U/L  --  13       Results from last 7 days  Lab Units 11/02/18  0526   INR  1 29     * I Have Reviewed All Lab Data Listed Above  * Additional Pertinent Lab Tests Reviewed: All Labs For Current Hospital Admission  Reviewed    Imaging:  Imaging Reports Reviewed Today Include: KUB: Persistent ileus  Recent Cultures (last 7 days):       Results from last 7 days  Lab Units 11/01/18  1719 11/01/18  1710   BLOOD CULTURE  No Growth at 24 hrs  No Growth at 24 hrs         Last 24 Hours Medication List:     Current Facility-Administered Medications:  acetaminophen 325 mg Rectal Q4H PRN JIMENEZ Bangura    ondansetron 4 mg Intravenous Q6H PRN JIMENEZ Bangura    pantoprazole 40 mg Intravenous Q12H [facility] JIMENEZ Bangura    phenol 1 spray Mouth/Throat Q2H PRN Anny Mcgee MD    potassium phosphate 21 mmol Intravenous Once Fco Escalante PA-C Last Rate: 21 mmol (11/03/18 1110)   sodium chloride 125 mL/hr Intravenous Continuous JIMENEZ Bangura Last Rate: 125 mL/hr (11/03/18 1116)        Today, Patient Was Seen By: Ramonita Banks PA-C    ** Please Note: Dictation voice to text software may have been used in the creation of this document   **

## 2018-11-03 NOTE — ASSESSMENT & PLAN NOTE
· Acute episode  · Secondary to gastroenteritis and ileus  · Continue with pain control  · improving

## 2018-11-03 NOTE — ASSESSMENT & PLAN NOTE
· Patient had been vomiting, and had episodes of diarrhea with sick contacts at home  · CT of the abdomen and pelvis showed gastroenteritis  · Patient was given Levaquin 750 mg IV in the [place] emergency department  · P r n   Zofran

## 2018-11-03 NOTE — PLAN OF CARE
CARDIOVASCULAR - ADULT     Maintains optimal cardiac output and hemodynamic stability Progressing     Absence of cardiac dysrhythmias or at baseline rhythm Progressing        DISCHARGE PLANNING - CARE MANAGEMENT     Discharge to post-acute care or home with appropriate resources Progressing        GASTROINTESTINAL - ADULT     Minimal or absence of nausea and/or vomiting Progressing     Maintains or returns to baseline bowel function Progressing     Maintains adequate nutritional intake Progressing     Establish and maintain optimal ostomy function Progressing        INFECTION - ADULT     Absence or prevention of progression during hospitalization Progressing     Absence of fever/infection during neutropenic period Progressing        Knowledge Deficit     Patient/family/caregiver demonstrates understanding of disease process, treatment plan, medications, and discharge instructions Progressing        METABOLIC, FLUID AND ELECTROLYTES - ADULT     Electrolytes maintained within normal limits Progressing     Fluid balance maintained Progressing     Glucose maintained within target range Progressing        Nutrition/Hydration-ADULT     Nutrient/Hydration intake appropriate for improving, restoring or maintaining nutritional needs Progressing        PAIN - ADULT     Verbalizes/displays adequate comfort level or baseline comfort level Progressing        Potential for Falls     Patient will remain free of falls Progressing        RESPIRATORY - ADULT     Achieves optimal ventilation and oxygenation Progressing        SAFETY ADULT     Maintain or return to baseline ADL function Progressing     Maintain or return mobility status to optimal level Progressing

## 2018-11-03 NOTE — ASSESSMENT & PLAN NOTE
· With acute infection +/- withdrawal from clozaril  · Monitor patient on telemetry  · Continue IV fluids  · Continue pain medications

## 2018-11-03 NOTE — ASSESSMENT & PLAN NOTE
· Noted on CT of the abdomen and pelvis  · Surgery following  · NG tube for currently 4 hour clamp and 1/2 hour suction with 25cc out per nurse on last suction  · continue NPO and IVF  · Protonix IV twice a day

## 2018-11-04 LAB
ANION GAP SERPL CALCULATED.3IONS-SCNC: 10 MMOL/L (ref 4–13)
BUN SERPL-MCNC: 12 MG/DL (ref 7–25)
CALCIUM SERPL-MCNC: 9.1 MG/DL (ref 8.6–10.5)
CHLORIDE SERPL-SCNC: 110 MMOL/L (ref 98–107)
CO2 SERPL-SCNC: 23 MMOL/L (ref 21–31)
CREAT SERPL-MCNC: 0.67 MG/DL (ref 0.7–1.3)
ERYTHROCYTE [DISTWIDTH] IN BLOOD BY AUTOMATED COUNT: 13.5 % (ref 11.5–14.5)
GFR SERPL CREATININE-BSD FRML MDRD: 136 ML/MIN/1.73SQ M
GLUCOSE SERPL-MCNC: 90 MG/DL (ref 65–99)
HCT VFR BLD AUTO: 38.2 % (ref 36.5–49.3)
HGB BLD-MCNC: 13.3 G/DL (ref 14–18)
MAGNESIUM SERPL-MCNC: 2.2 MG/DL (ref 1.9–2.7)
MCH RBC QN AUTO: 31.5 PG (ref 26–34)
MCHC RBC AUTO-ENTMCNC: 35 G/DL (ref 31–37)
MCV RBC AUTO: 90 FL (ref 81–99)
PHOSPHATE SERPL-MCNC: 2.8 MG/DL (ref 3–5.5)
PLATELET # BLD AUTO: 314 THOUSANDS/UL (ref 149–390)
PMV BLD AUTO: 7.1 FL (ref 8.6–11.7)
POTASSIUM SERPL-SCNC: 3.2 MMOL/L (ref 3.5–5.5)
RBC # BLD AUTO: 4.23 MILLION/UL (ref 4.3–5.9)
SODIUM SERPL-SCNC: 143 MMOL/L (ref 134–143)
WBC # BLD AUTO: 6.6 THOUSAND/UL (ref 4.8–10.8)

## 2018-11-04 PROCEDURE — 84100 ASSAY OF PHOSPHORUS: CPT | Performed by: PHYSICIAN ASSISTANT

## 2018-11-04 PROCEDURE — C9113 INJ PANTOPRAZOLE SODIUM, VIA: HCPCS | Performed by: NURSE PRACTITIONER

## 2018-11-04 PROCEDURE — 85027 COMPLETE CBC AUTOMATED: CPT | Performed by: PHYSICIAN ASSISTANT

## 2018-11-04 PROCEDURE — 80048 BASIC METABOLIC PNL TOTAL CA: CPT | Performed by: PHYSICIAN ASSISTANT

## 2018-11-04 PROCEDURE — 99232 SBSQ HOSP IP/OBS MODERATE 35: CPT | Performed by: SPECIALIST

## 2018-11-04 PROCEDURE — 99232 SBSQ HOSP IP/OBS MODERATE 35: CPT | Performed by: INTERNAL MEDICINE

## 2018-11-04 PROCEDURE — 83735 ASSAY OF MAGNESIUM: CPT | Performed by: PHYSICIAN ASSISTANT

## 2018-11-04 PROCEDURE — 97110 THERAPEUTIC EXERCISES: CPT

## 2018-11-04 RX ORDER — POTASSIUM CHLORIDE 14.9 MG/ML
20 INJECTION INTRAVENOUS
Status: COMPLETED | OUTPATIENT
Start: 2018-11-04 | End: 2018-11-04

## 2018-11-04 RX ADMIN — POTASSIUM CHLORIDE 20 MEQ: 200 INJECTION, SOLUTION INTRAVENOUS at 11:04

## 2018-11-04 RX ADMIN — SODIUM CHLORIDE 125 ML/HR: 9 INJECTION, SOLUTION INTRAVENOUS at 03:54

## 2018-11-04 RX ADMIN — CLOZAPINE 100 MG: 100 TABLET ORAL at 23:34

## 2018-11-04 RX ADMIN — CLOMIPRAMINE HYDROCHLORIDE 100 MG: 25 CAPSULE ORAL at 23:34

## 2018-11-04 RX ADMIN — CLOZAPINE 100 MG: 100 TABLET ORAL at 14:55

## 2018-11-04 RX ADMIN — PANTOPRAZOLE SODIUM 40 MG: 40 INJECTION, POWDER, FOR SOLUTION INTRAVENOUS at 08:19

## 2018-11-04 RX ADMIN — SODIUM CHLORIDE 75 ML/HR: 9 INJECTION, SOLUTION INTRAVENOUS at 16:04

## 2018-11-04 RX ADMIN — POTASSIUM CHLORIDE 20 MEQ: 200 INJECTION, SOLUTION INTRAVENOUS at 13:28

## 2018-11-04 RX ADMIN — PANTOPRAZOLE SODIUM 40 MG: 40 INJECTION, POWDER, FOR SOLUTION INTRAVENOUS at 23:44

## 2018-11-04 NOTE — PROGRESS NOTES
Progress Note - Jean Claude Saba 1995, 26 y o  male MRN: [id number]    Unit/Bed#: -02 Encounter: [id number]    Primary Care Provider: Nga Ramos DO   Date and time admitted to hospital: 2018  9:55 PM        Hypokalemia   Assessment & Plan    · Replete potassium     Tachycardia   Assessment & Plan    · Sinus tachycardia  · Likely due to discomfort  · Continue monitor     Lactic acidosis   Assessment & Plan    · Resolved     Depression   Assessment & Plan    · Chronic  · Resume home meds when able to take PO     Autistic disorder, active   Assessment & Plan    · Chronic  · Supportive care     * Ileus ([address] )   Assessment & Plan    · Appreciate surgical input  · Continue trickle feeds under the guidance of surgery  · IV fluids  · No flatus or BM as of yet           VTE Pharmacologic Prophylaxis: Pharmacologic:   Ambulate    Patient Centered Rounds: I have performed bedside rounds with nursing staff today  Discussions with Specialists or Other Care Team Provider: n/a  Education and Discussions with Family / Patient: mother    Time Spent for Care: 20 minutes  More than 50% of total time spent on counseling and coordination of care as described above  Current Length of Stay: 3 day(s)    Current Patient Status: Inpatient   Certification Statement: The patient will continue to require additional inpatient hospital stay due to ileus    Discharge Plan:   Pending hospital course    Code Status: Level 1 - Full Code    Subjective:   Patient seen examined  No acute events overnight  Mother was at bedside, patient still has not had a BM or flatus    Objective:     Vitals:   Temp (24hrs), Av 3 °F (37 4 °C), Min:98 4 °F (36 9 °C), Max:100 3 °F (37 9 °C)    Temp:  [98 4 °F (36 9 °C)-100 3 °F (37 9 °C)] 98 7 °F (37 1 °C)  HR:  [117-129] 117  Resp:  [18] 18  BP: (125-142)/(79-89) 125/79  SpO2:  [89 %-95 %] 90 %  Body mass index is 36 4 kg/m²       Input and Output Summary (last 24 hours): Intake/Output Summary (Last 24 hours) at 11/04/18 0951  Last data filed at 11/04/18 4615   Gross per 24 hour   Intake             2450 ml   Output             2290 ml   Net              160 ml       Physical Exam:     Physical Exam   Constitutional: He is oriented to person, place, and time  He appears well-developed and well-nourished  HENT:   Head: Normocephalic and atraumatic  NG tube in place   Eyes: Pupils are equal, round, and reactive to light  EOM are normal    Neck: Normal range of motion  Neck supple  Cardiovascular: Regular rhythm and normal heart sounds  Tachycardia   Pulmonary/Chest: Effort normal and breath sounds normal  No respiratory distress  He has no wheezes  Abdominal: Soft  Hypoactive bowel sounds   Musculoskeletal: Normal range of motion  He exhibits no edema  Neurological: He is alert and oriented to person, place, and time  Skin: Skin is warm  No rash noted  No erythema  Psychiatric: He has a normal mood and affect  His behavior is normal  Thought content normal    Nursing note and vitals reviewed  Additional Data:     Labs:      Results from last 7 days  Lab Units 11/04/18  0450 11/02/18  0525   WBC Thousand/uL 6 60 4 80   HEMOGLOBIN g/dL 13 3* 14 8   HEMATOCRIT % 38 2 42 6   PLATELETS Thousands/uL 314 268   NEUTROS PCT %  --  60   LYMPHS PCT %  --  20*   MONOS PCT %  --  20*   EOS PCT %  --  0       Results from last 7 days  Lab Units 11/04/18  0450  11/02/18  0525   POTASSIUM mmol/L 3 2*  < > 3 5   CHLORIDE mmol/L 110*  < > 106   CO2 mmol/L 23  < > 20*   BUN mg/dL 12  < > 17   CREATININE mg/dL 0 67*  < > 0 82   CALCIUM mg/dL 9 1  < > 8 8   ALK PHOS U/L  --   --  42   ALT U/L  --   --  31   AST U/L  --   --  13   < > = values in this interval not displayed  Results from last 7 days  Lab Units 11/02/18  0526   INR  1 29               * I Have Reviewed All Lab Data Listed Above  * Additional Pertinent Lab Tests Reviewed:  Chato Zeng Admission  Reviewed    Imaging:  Imaging Reports Reviewed Today Include:   Abdominal x-ray    Recent Cultures (last 7 days):       Results from last 7 days  Lab Units 11/01/18  1719 11/01/18  1710   BLOOD CULTURE  No Growth at 48 hrs  No Growth at 48 hrs  Last 24 Hours Medication List:     Current Facility-Administered Medications:  acetaminophen 325 mg Rectal Q4H PRN JIMENEZ Bangura    clomiPRAMINE 100 mg Oral HS Tutu Osorio PA-C    cloZAPine 100 mg Oral HS Tutu Osorio PA-C    cloZAPine 100 mg Oral After CRISTÓBAL Francis    ondansetron 4 mg Intravenous Q6H PRN JIMENEZ Bangura    pantoprazole 40 mg Intravenous Q12H [address] JIMENEZ Bangura    phenol 1 spray Mouth/Throat Q2H PRN Ra Harding MD    potassium chloride 20 mEq Intravenous Q2H Edil Barrera MD    sodium chloride 125 mL/hr Intravenous Continuous JIMENEZ Bangura Last Rate: 125 mL/hr (11/04/18 0354)        Today, Patient Was Seen By: Edil Barrera MD    ** Please Note: Dictation voice to text software may have been used in the creation of this document   **

## 2018-11-04 NOTE — PLAN OF CARE
Problem: PHYSICAL THERAPY ADULT  Goal: Performs mobility at highest level of function for planned discharge setting  See evaluation for individualized goals  Treatment/Interventions: Functional transfer training, Therapeutic exercise, Endurance training, Cognitive reorientation, Patient/family training, Equipment eval/education, Gait training  Equipment Recommended: Lisa Bautista       See flowsheet documentation for full assessment, interventions and recommendations  YESENIA Lambert     Outcome: Progressing  Prognosis: Good  Problem List: Decreased strength, Decreased endurance, Impaired balance, Decreased mobility, Decreased coordination, Decreased cognition, Impaired judgement  Assessment: Pt agreeable to treatment post NSG unconnecting of NG tube  Pt amb 80 ft with Supervision with slow gt pattern  PT reported fatigue post amb but was greeabel to therex post a quick rest   Pt performed BLE therex in chair without difficulty  Pt presented with a very flat affect but would anser questions when asked  Continue PT to increse endurance and balance to promote safe mobility upon return home  Recommendation: Home PT     PT - OK to Discharge: Yes    See flowsheet documentation for full assessment     Angelo Mcghee, PT

## 2018-11-04 NOTE — PHYSICAL THERAPY NOTE
Physical Therapy Treatment Note     11/04/18 1142   Pain Assessment   Pain Assessment No/denies pain   Restrictions/Precautions   Weight Bearing Precautions Per Order No   Other Precautions Cognitive;Multiple lines; Fall Risk  (NG tubes which NSG unconnected)   General   Chart Reviewed Yes   Family/Caregiver Present No   Cognition   Arousal/Participation Lethargic   Attention Within functional limits   Following Commands Follows all commands and directions without difficulty   Subjective   Subjective pt was agreeable to treatment   Transfers   Sit to Stand 7  Independent   Stand to Sit 7  Independent   Ambulation/Elevation   Gait pattern Short stride; Excessively slow   Gait Assistance 5  Supervision   Assistive Device None   Distance 80 ft   Balance   Static Sitting Good   Dynamic Sitting Fair +   Static Standing Fair   Dynamic Standing Fair -   Ambulatory Fair -   Endurance Deficit   Endurance Deficit Yes   Endurance Deficit Description (pt reported that he was tired post amb bur denied SOB)   Activity Tolerance   Activity Tolerance Patient limited by fatigue   Exercises   Hamstring Sets Supine;15 reps;AROM; Bilateral   Hip Flexion Sitting;15 reps;AROM; Bilateral   Hip Abduction Sitting;25 reps;AROM; Bilateral   Hip Adduction Sitting;25 reps;AROM; Bilateral   Knee AROM Sitting;15 reps;AROM; Bilateral   Assessment   Prognosis Good   Assessment Pt agreeable to treatment post NSG unconnecting of NG tube  Pt amb 80 ft with Supervision with slow gt pattern  PT reported fatigue post amb but was greeabel to therex post a quick rest   Pt performed BLE therex in chair without difficulty  Pt presented with a very flat affect but would anser questions when asked  Continue PT to increse endurance and balance to promote safe mobility upon return home     Goals   Patient Goals family wanted him to be beth to help with washing   Treatment Day 2   Plan   Progress Progressing toward goals   Recommendation   Recommendation Home PT PT - OK to Discharge Yes   Additional Comments Pt left in chair with fmaly present   [address] [facility],

## 2018-11-04 NOTE — PROGRESS NOTES
Progress Note - General Surgery   Giovanni Demarco 26 y o  male MRN: [id number]  Unit/Bed#: -02 Encounter: [id number]    Assessment:  OOB to chair, tolerating trickle feeds at 25 cc/hr  Right flank pain resolved, having flatus, but no BM  KUB yesterday shows SB distention improved, most of the air is in the Colon  Plan:  Advance tube feedings, remove NG and start diet when GI function returns  Subjective/Objective   Chief Complaint: Abdominal discomfort    Subjective: OOB to chair, feeling better, but no BM yet    Objective:     Blood pressure 125/79, pulse (!) 117, temperature 98 7 °F (37 1 °C), temperature source Tympanic, resp  rate 18, height 5' 3" (1 6 m), weight 93 2 kg (205 lb 8 oz), SpO2 90 %  ,Body mass index is 36 4 kg/m²        Intake/Output Summary (Last 24 hours) at 11/04/18 1101  Last data filed at 11/04/18 0807   Gross per 24 hour   Intake             2450 ml   Output             2290 ml   Net              160 ml       Invasive Devices     Peripheral Intravenous Line            Peripheral IV 11/01/18 Right Antecubital 2 days          Drain            NG/OG/Enteral Tube Nasogastric 16 Fr Right nares 2 days              Physical Exam:     Abdomen mildly distended with active bowel sounds    Lab, Imaging and other studies:  CBC:   Lab Results   Component Value Date    WBC 6 60 11/04/2018    HGB 13 3 (L) 11/04/2018    HCT 38 2 11/04/2018    MCV 90 11/04/2018     11/04/2018    MCH 31 5 11/04/2018    MCHC 35 0 11/04/2018    RDW 13 5 11/04/2018    MPV 7 1 (L) 11/04/2018   , CMP:   Lab Results   Component Value Date    K 3 2 (L) 11/04/2018     (H) 11/04/2018    CO2 23 11/04/2018    BUN 12 11/04/2018    CREATININE 0 67 (L) 11/04/2018    CALCIUM 9 1 11/04/2018    EGFR 136 11/04/2018     VTE Pharmacologic Prophylaxis: Enoxaparin (Lovenox)  VTE Mechanical Prophylaxis: sequential compression device

## 2018-11-04 NOTE — NURSING NOTE
No changes in the pts assess throughout the day, dr Lilly Mace was in to see and assess thept , cont to tolerated the TF well with minimal residuals, pt spent most of the day oob in his chair and ambulating in the room, presently in bed in no acute distress sleeping, callbell in reach of the pt and the family is at his bedside

## 2018-11-04 NOTE — ASSESSMENT & PLAN NOTE
· Appreciate surgical input  · Continue trickle feeds under the guidance of surgery  · IV fluids  · No flatus or BM as of yet

## 2018-11-05 ENCOUNTER — TRANSITIONAL CARE MANAGEMENT (OUTPATIENT)
Dept: FAMILY MEDICINE CLINIC | Facility: CLINIC | Age: 23
End: 2018-11-05

## 2018-11-05 VITALS
SYSTOLIC BLOOD PRESSURE: 144 MMHG | BODY MASS INDEX: 36.79 KG/M2 | TEMPERATURE: 99.4 F | HEART RATE: 111 BPM | HEIGHT: 63 IN | OXYGEN SATURATION: 95 % | RESPIRATION RATE: 18 BRPM | WEIGHT: 207.6 LBS | DIASTOLIC BLOOD PRESSURE: 89 MMHG

## 2018-11-05 LAB
ALBUMIN SERPL BCP-MCNC: 3.8 G/DL (ref 3.5–5.7)
ALP SERPL-CCNC: 46 U/L (ref 40–150)
ALT SERPL W P-5'-P-CCNC: 20 U/L (ref 7–52)
ANION GAP SERPL CALCULATED.3IONS-SCNC: 10 MMOL/L (ref 4–13)
AST SERPL W P-5'-P-CCNC: 13 U/L (ref 13–39)
BILIRUB SERPL-MCNC: 0.4 MG/DL (ref 0.2–1)
BUN SERPL-MCNC: 11 MG/DL (ref 7–25)
CALCIUM SERPL-MCNC: 9.1 MG/DL (ref 8.6–10.5)
CHLORIDE SERPL-SCNC: 107 MMOL/L (ref 98–107)
CO2 SERPL-SCNC: 25 MMOL/L (ref 21–31)
CREAT SERPL-MCNC: 0.64 MG/DL (ref 0.7–1.3)
ERYTHROCYTE [DISTWIDTH] IN BLOOD BY AUTOMATED COUNT: 13.6 % (ref 11.5–14.5)
GFR SERPL CREATININE-BSD FRML MDRD: 139 ML/MIN/1.73SQ M
GLUCOSE SERPL-MCNC: 106 MG/DL (ref 65–99)
HCT VFR BLD AUTO: 40.5 % (ref 36.5–49.3)
HGB BLD-MCNC: 13.4 G/DL (ref 14–18)
MCH RBC QN AUTO: 30.4 PG (ref 26–34)
MCHC RBC AUTO-ENTMCNC: 33 G/DL (ref 31–37)
MCV RBC AUTO: 92 FL (ref 81–99)
PLATELET # BLD AUTO: 342 THOUSANDS/UL (ref 149–390)
PMV BLD AUTO: 7.4 FL (ref 8.6–11.7)
POTASSIUM SERPL-SCNC: 3.5 MMOL/L (ref 3.5–5.5)
PROT SERPL-MCNC: 6.2 G/DL (ref 6.4–8.9)
RBC # BLD AUTO: 4.4 MILLION/UL (ref 4.3–5.9)
SODIUM SERPL-SCNC: 142 MMOL/L (ref 134–143)
WBC # BLD AUTO: 8 THOUSAND/UL (ref 4.8–10.8)

## 2018-11-05 PROCEDURE — 97116 GAIT TRAINING THERAPY: CPT

## 2018-11-05 PROCEDURE — 97535 SELF CARE MNGMENT TRAINING: CPT

## 2018-11-05 PROCEDURE — 80053 COMPREHEN METABOLIC PANEL: CPT | Performed by: INTERNAL MEDICINE

## 2018-11-05 PROCEDURE — 99231 SBSQ HOSP IP/OBS SF/LOW 25: CPT | Performed by: SPECIALIST

## 2018-11-05 PROCEDURE — 85027 COMPLETE CBC AUTOMATED: CPT | Performed by: INTERNAL MEDICINE

## 2018-11-05 PROCEDURE — 99239 HOSP IP/OBS DSCHRG MGMT >30: CPT | Performed by: HOSPITALIST

## 2018-11-05 PROCEDURE — C9113 INJ PANTOPRAZOLE SODIUM, VIA: HCPCS | Performed by: NURSE PRACTITIONER

## 2018-11-05 RX ADMIN — SODIUM CHLORIDE 75 ML/HR: 9 INJECTION, SOLUTION INTRAVENOUS at 05:37

## 2018-11-05 RX ADMIN — CLOZAPINE 100 MG: 100 TABLET ORAL at 13:20

## 2018-11-05 RX ADMIN — PANTOPRAZOLE SODIUM 40 MG: 40 INJECTION, POWDER, FOR SOLUTION INTRAVENOUS at 08:27

## 2018-11-05 NOTE — PHYSICAL THERAPY NOTE
11/05/18 0920   Pain Assessment   Pain Assessment 0-10   Pain Score No Pain   Restrictions/Precautions   Weight Bearing Precautions Per Order No   Other Precautions Cognitive;Multiple lines; Fall Risk  (RN disconnected NG tube & IV for PT)   General   Chart Reviewed Yes   Additional Pertinent History Pt has high-level functioning autism   Family/Caregiver Present Yes  (mom present)   Cognition   Overall Cognitive Status Unable to assess   Arousal/Participation Responsive; Cooperative   Attention Within functional limits   Orientation Level Oriented X4   Memory Unable to assess   Following Commands Follows one step commands with increased time or repetition   Subjective   Subjective pt agreeable to PT session   Bed Mobility   Supine to Sit 7  Independent   Sit to Supine 7  Independent   Transfers   Sit to Stand 7  Independent   Stand to Sit 7  Independent   Ambulation/Elevation   Gait pattern Short stride   Gait Assistance 5  Supervision   Assistive Device None   Distance 250 feet x 2   Stair Management Assistance Not tested   Balance   Static Sitting Good   Dynamic Sitting Fair +   Static Standing Fair   Dynamic Standing Fair   Ambulatory Fair   Endurance Deficit   Endurance Deficit No   Activity Tolerance   Activity Tolerance Patient tolerated treatment well   Exercises   Quad Sets Supine;10 reps;Bilateral   Hip Abduction Supine;10 reps;Bilateral   Hip Adduction Sitting;10 reps;Bilateral   Ankle Pumps Supine;10 reps;Bilateral   Assessment   Prognosis Good   Problem List Decreased strength;Decreased endurance; Impaired balance;Decreased mobility; Decreased coordination;Decreased cognition; Impaired judgement   Assessment Pt agreed to PT session & mother was present  He was able to transfer [address] without  x 2 with supervision for safety  His gait was steady, no LOB  He returned to room to rest between amb trials  He performed B LE ther ex as per flowsheet in bed post amb to end session   All need in reach  He tolerated session well & should be safe ambulating at home upon d/c with supervision of parents  Goals   Patient Goals to go home soon   Treatment Day 3   Plan   Treatment/Interventions Functional transfer training;LE strengthening/ROM; Therapeutic exercise; Endurance training;Patient/family training;Equipment eval/education; Bed mobility;Gait training;Spoke to nursing   Progress Progressing toward goals   PT Frequency 5x/wk   Recommendation   Recommendation Home PT; Home with family support   PT - OK to Discharge Yes  (when med cleared)   Jorge Holly, PTA

## 2018-11-05 NOTE — PLAN OF CARE
Problem: OCCUPATIONAL THERAPY ADULT  Goal: Performs self-care activities at highest level of function for planned discharge setting  See evaluation for individualized goals  Outcome: Progressing  Limitation: Decreased ADL status, Decreased endurance, Decreased Safe judgement during ADL     Assessment: Patient participated in Skilled OT session this date with interventions consisting of ADL re training with the use of correct body mechnaics and  therapeutic activities to: increase activity tolerance   Patient agreeable to OT treatment session, upon arrival patient was found seated OOB to Chair  Patient requiring cognitive assistance to anticipate next step, one step directives and ocassional safety reminders  Patient continues to be functioning below baseline level, occupational performance remains limited secondary to factors listed above and increased risk for falls and injury  From OT standpoint, recommendation at time of d/c would be Home with family support  Patient to benefit from continued Occupational Therapy treatment while in the hospital to address deficits as defined above and maximize level of functional independence with ADLs and functional mobility       DONATO Rice

## 2018-11-05 NOTE — PLAN OF CARE
Problem: Nutrition/Hydration-ADULT  Goal: Nutrient/Hydration intake appropriate for improving, restoring or maintaining nutritional needs  Monitor and assess patient's nutrition/hydration status for malnutrition (ex- brittle hair, bruises, dry skin, pale skin and conjunctiva, muscle wasting, smooth red tongue, and disorientation)  Collaborate with interdisciplinary team and initiate plan and interventions as ordered  Monitor patient's weight and dietary intake as ordered or per policy  Utilize nutrition screening tool and intervene per policy  Determine patient's food preferences and provide high-protein, high-caloric foods as appropriate  INTERVENTIONS:  - Monitor oral intake, urinary output, labs, and treatment plans  - Assess nutrition and hydration status and recommend course of action  - Evaluate amount of meals eaten  - Assist patient with eating if necessary   - Allow adequate time for meals  - Recommend/ encourage appropriate diets, oral nutritional supplements, and vitamin/mineral supplements  - Order, calculate, and assess calorie counts as needed  - Recommend, monitor, and adjust tube feedings and TPN/PPN based on assessed needs  - Assess need for intravenous fluids  - Provide specific nutrition/hydration education as appropriate  - Include patient/family/caregiver in decisions related to nutrition   Outcome: Progressing  He is on clear liquid with toast and crackers  RDN to check his tolerance with his team  His enteral feeding was stopped  His weight was 205 on 11-4 then 207 on 11-5  He is on a zofran and KCL  His BG was 106 H and albumin 3 8 on 11-5  Skin intact  RDN visited at breakfast and lunch and he did have a BM   RDN to reassess his nutrition needs at high risk and follow on rounds PRN

## 2018-11-05 NOTE — PROGRESS NOTES
Progress Note - General Surgery   Maxime Late 26 y o  male MRN: [id number]  Unit/Bed#: -02 Encounter: [id number]    Assessment:  Tolerating Tube feedings at 60cc/hr  Reports of Bowel Movement, No c/o abdominal pain    Plan:  Remove NG tube and begin PO diet    Subjective/Objective   Chief Complaint: Abdominal distention    Subjective: OOB to chair, ambulating with Tube feedings    Objective:     Blood pressure 144/89, pulse (!) 111, temperature 99 4 °F (37 4 °C), temperature source Tympanic, resp  rate 18, height 5' 3" (1 6 m), weight 94 2 kg (207 lb 9 6 oz), SpO2 95 %  ,Body mass index is 36 77 kg/m²  Intake/Output Summary (Last 24 hours) at 11/05/18 1017  Last data filed at 11/05/18 7155   Gross per 24 hour   Intake          2966 75 ml   Output              850 ml   Net          2116 75 ml       Invasive Devices     Peripheral Intravenous Line            Peripheral IV 11/01/18 Right Antecubital 3 days          Drain            NG/OG/Enteral Tube Nasogastric 16 Fr Right nares 3 days                Physical Exam:   Abdomen:  Softer with more active bowel sounds, still a bit hypoactive    No abdominal discomfort with palpation    Lab, Imaging and other studies:  CBC:   Lab Results   Component Value Date    WBC 8 00 11/05/2018    HGB 13 4 (L) 11/05/2018    HCT 40 5 11/05/2018    MCV 92 11/05/2018     11/05/2018    MCH 30 4 11/05/2018    MCHC 33 0 11/05/2018    RDW 13 6 11/05/2018    MPV 7 4 (L) 11/05/2018   , CMP:   Lab Results   Component Value Date    K 3 5 11/05/2018     11/05/2018    CO2 25 11/05/2018    BUN 11 11/05/2018    CREATININE 0 64 (L) 11/05/2018    CALCIUM 9 1 11/05/2018    AST 13 11/05/2018    ALT 20 11/05/2018    ALKPHOS 46 11/05/2018    EGFR 139 11/05/2018     VTE Pharmacologic Prophylaxis: Enoxaparin (Lovenox)  VTE Mechanical Prophylaxis: sequential compression device

## 2018-11-05 NOTE — DISCHARGE SUMMARY
Discharge- Fred Alcantara 1995, 26 y o  male MRN: [id number]    Unit/Bed#: -02 Encounter: [id number]    Primary Care Provider: Gail Cano DO   Date and time admitted to hospital: 11/1/2018  9:55 PM        * Ileus [facility])   Assessment & Plan    · Status post a general surgical evaluation   · Status post an NG tube with feeds  · NG tube discontinued earlier this morning  · Patient is currently asymptomatic  · Patient has had bowel movements  · Patient is tolerating clear liquid diet  · Case discussed with Dr Margo Gooden, patient okay for discharge from a surgical standpoint       Acute gastroenteritis   Assessment & Plan    · Resolved  · This was a viral acute gastroenteritis     Hypokalemia   Assessment & Plan    · Resolved with repletion     Hypomagnesemia   Assessment & Plan    · resolved with repletion     Hypophosphatemia   Assessment & Plan    · Resolved with repletion     Lactic acidosis   Assessment & Plan    · Resolved     Periumbilical abdominal pain   Assessment & Plan    · Resolved - secondary to the gastroenteritis and ileus     Tachycardia   Assessment & Plan    · Resolved  · Secondary to his abdominal pain     Vitamin D deficiency   Assessment & Plan    · Chronic       Acute dehydration   Assessment & Plan    · Resolved - arrival creatinine was 1 21 - it is now down to 0 64     Autistic disorder, active   Assessment & Plan    · Chronic  · Supportive care     Chronic schizophreniform disorder ([facility] [address] )   Assessment & Plan    · DC home on all pre-admit meds debridement dosages     Depression   Assessment & Plan    · DC home on all pre admit meds at pre-admit dosages     Hyperlipidemia   Assessment & Plan    · Stable           Discharging Physician / Practitioner: Checo Grigsby MD  PCP: Gail Cano DO  Admission Date:   Admission Orders     Ordered        11/01/18 2224  Inpatient Admission  Once             Discharge Date: 11/05/18    Resolved Problems  Date Reviewed: 11/4/2018 None          Consultations During Hospital Stay:  · Surgery    Procedures Performed:   · None    Significant Findings / Test Results:   · Sequential x-rays of the abdomen yielded ileus  · CT of the abdomen/pelvis-findings were consistent with gastroenteritis with associated moderate to severe ileus  No free air collections were noted  Incidental Findings:   · None     Test Results Pending at Discharge (will require follow up): · None     Outpatient Tests Requested:  · None    Complications:  None    Reason for Admission:   Abdominal pain/ileus    Hospital Course:     Carlos Romero is a 26 y o  male patient who originally presented to the hospital on 11/1/2018 due to abdominal pain  Patient was diagnosed with a gastroenteritis and ileus as outlined on the CT scan of the abdomen  Patient was admitted to [facility]  He was kept NPO, given IV fluids and supportive therapy  Ultimately an NG tube was placed  Surgical services were following along in consultation  Patient was started on tube feeds for nutritional support  Patient had serial abdominal x-rays for close monitoring  He did well over the weekend  On Monday 11/05/2018 - he regained a normal bowel function  His NG tube was discontinued  His feeds were stopped  He was started on a clear liquid diet which was advanced as tolerated  Patient had regular bowel movements  He was deemed stable for discharge  He will be discharged on all of his other pre-admission medications at the preadmission dosages  Additionally, during his stay he was found to be hypomagnesemic, hypokalemic and hypophosphatemic  All electrolytes were repleted accordingly  He had a mild dehydration with an arrival creatinine of 1 21 and departure creatinine of 0 64  He was treated with IV fluids  He was otherwise medically stable at time of discharge  Please see above list of diagnoses and related plan for additional information       Condition at Discharge: good     Discharge Day Visit / Exam:     Subjective:  Patient seen and examined  No new complaints no visible distress  Vitals: Blood Pressure: 144/89 (11/05/18 0712)  Pulse: (!) 111 (11/05/18 0712)  Temperature: 99 4 °F (37 4 °C) (11/05/18 0712)  Temp Source: Tympanic (11/05/18 4016)  Respirations: 18 (11/05/18 0712)  Height: 5' 3" (160 cm) (11/01/18 2158)  Weight - Scale: 94 2 kg (207 lb 9 6 oz) (11/05/18 0538)  SpO2: 95 % (11/05/18 3509)  Exam:   Physical Exam   Constitutional: He is oriented to person, place, and time  He appears well-developed and well-nourished  HENT:   Head: Normocephalic and atraumatic  Nose: Nose normal    Mouth/Throat: Oropharynx is clear and moist    Eyes: Pupils are equal, round, and reactive to light  Conjunctivae and EOM are normal    Neck: Normal range of motion  Neck supple  No JVD present  No thyromegaly present  Cardiovascular: Normal rate, regular rhythm and intact distal pulses  Exam reveals no gallop and no friction rub  No murmur heard  Pulmonary/Chest: Effort normal and breath sounds normal  No respiratory distress  Abdominal: Soft  Bowel sounds are normal  He exhibits no distension and no mass  There is no tenderness  There is no guarding  Musculoskeletal: Normal range of motion  He exhibits no edema  Lymphadenopathy:     He has no cervical adenopathy  Neurological: He is alert and oriented to person, place, and time  No cranial nerve deficit  Skin: Skin is warm  No rash noted  No erythema  Psychiatric: He has a normal mood and affect  His behavior is normal    Vitals reviewed  Discussion with Family:   Patient's mother was bedside at the time of my discharge evaluation, all questions answered to her satisfaction    Discharge instructions/Information to patient and family:   See after visit summary for information provided to patient and family        Provisions for Follow-Up Care:  See after visit summary for information related to follow-up care and any pertinent home health orders  Disposition:     Home    For Discharges to [facility] SNF:   · Not Applicable to this Patient - Not Applicable to this Patient    Planned Readmission:   None     Discharge Statement:  I spent 35 minutes discharging the patient  This time was spent on the day of discharge  I had direct contact with the patient on the day of discharge  Greater than 50% of the total time was spent examining patient, answering all patient questions, arranging and discussing plan of care with patient as well as directly providing post-discharge instructions  Additional time then spent on discharge activities  Discharge Medications:  See after visit summary for reconciled discharge medications provided to patient and family        ** Please Note: This note has been constructed using a voice recognition system **

## 2018-11-05 NOTE — OCCUPATIONAL THERAPY NOTE
11/05/18 1315   Restrictions/Precautions   Weight Bearing Precautions Per Order No   Other Precautions Cognitive; Fall Risk   Pain Assessment   Pain Assessment No/denies pain   ADL   Where Assessed Chair   UB Bathing Assistance 5  Supervision/Setup   UB Bathing Deficit Setup;Verbal cueing   UB Bathing Comments needed step by step direction to complete all parts   LB Bathing Assistance 5  Supervision/Setup   LB Bathing Deficit Setup;Verbal cueing;Supervision/safety   LB Bathing Comments stood from chair X 3/as needed to move clothing    Transfers   Sit to Stand 7  Independent   Additional items Impulsive   Stand to Sit 7  Independent   Additional items Armrests   Cognition   Overall Cognitive Status Impaired   Arousal/Participation Responsive; Cooperative   Comments pt  would ask before proceeding with most motions/decisions   Activity Tolerance   Activity Tolerance Patient tolerated treatment well   Assessment   Assessment Patient participated in Skilled OT session this date with interventions consisting of ADL re training with the use of correct body mechnaics and  therapeutic activities to: increase activity tolerance   Patient agreeable to OT treatment session, upon arrival patient was found seated OOB to Chair  Patient requiring cognitive assistance to anticipate next step, one step directives and ocassional safety reminders  Patient continues to be functioning below baseline level, occupational performance remains limited secondary to factors listed above and increased risk for falls and injury  From OT standpoint, recommendation at time of d/c would be Home with family support  Patient to benefit from continued Occupational Therapy treatment while in the hospital to address deficits as defined above and maximize level of functional independence with ADLs and functional mobility  Plan   Treatment Interventions ADL retraining; Activityengagement   Treatment Day [address]/HARVEY

## 2018-11-05 NOTE — PROGRESS NOTES
Pleasant and cooperative  No c/o  Pt states he is thirsty  Loose BM's X 3 in commode this shift  No NGT residual noted after 6 hrs- Jevity 1 2 rate increased by 10cc's to 60cc/hr  50cc flushes q6hrs continued  Ambulated in pete with PT    Mother at bedside

## 2018-11-05 NOTE — ASSESSMENT & PLAN NOTE
· Status post a general surgical evaluation   · Status post an NG tube with feeds  · NG tube discontinued earlier this morning  · Patient is currently asymptomatic  · Patient has had bowel movements  · Patient is tolerating clear liquid diet  · Case discussed with Dr Debra Keane, patient okay for discharge from a surgical standpoint

## 2018-11-06 LAB
BACTERIA BLD CULT: NORMAL
BACTERIA BLD CULT: NORMAL

## 2018-11-06 NOTE — UTILIZATION REVIEW
Notification of Discharge  This is a Notification of Discharge from our facility [address]  Please be advised that this patient has been discharge from our facility  Below you will find the admission and discharge date and time including the patients disposition  PRESENTATION DATE: 11/1/2018  9:55 PM  IP ADMISSION DATE: 11/1/18 2155  DISCHARGE DATE: 11/5/2018  1:35 PM  DISPOSITION: [id number] Utilization Review Department  Phone: [phone]; Fax [phone]  ATTENTION: Please call with any questions or concerns to [phone]  and carefully listen to the prompts so that you are directed to the right person  Send all requests for admission clinical reviews, approved or denied determinations and any other requests to fax [phone]   All voicemails are confidential

## 2018-11-07 ENCOUNTER — OFFICE VISIT (OUTPATIENT)
Dept: FAMILY MEDICINE CLINIC | Facility: CLINIC | Age: 23
End: 2018-11-07
Payer: COMMERCIAL

## 2018-11-07 ENCOUNTER — HOSPITAL ENCOUNTER (OUTPATIENT)
Dept: RADIOLOGY | Facility: HOSPITAL | Age: 23
Discharge: HOME | End: 2018-11-07
Payer: COMMERCIAL

## 2018-11-07 ENCOUNTER — APPOINTMENT (OUTPATIENT)
Dept: LAB | Facility: HOSPITAL | Age: 23
End: 2018-11-07
Payer: COMMERCIAL

## 2018-11-07 VITALS
HEART RATE: 93 BPM | WEIGHT: 209.8 LBS | TEMPERATURE: 97.9 F | BODY MASS INDEX: 37.17 KG/M2 | HEIGHT: 63 IN | DIASTOLIC BLOOD PRESSURE: 86 MMHG | OXYGEN SATURATION: 97 % | SYSTOLIC BLOOD PRESSURE: 112 MMHG

## 2018-11-07 DIAGNOSIS — E83.39 HYPOPHOSPHATEMIA: ICD-10-CM

## 2018-11-07 DIAGNOSIS — E83.42 HYPOMAGNESEMIA: ICD-10-CM

## 2018-11-07 DIAGNOSIS — E87.6 HYPOKALEMIA: ICD-10-CM

## 2018-11-07 DIAGNOSIS — R10.9 ABDOMINAL DISCOMFORT IN RIGHT FLANK: ICD-10-CM

## 2018-11-07 DIAGNOSIS — R63.8 DECREASED ORAL INTAKE: ICD-10-CM

## 2018-11-07 DIAGNOSIS — R10.9 ABDOMINAL DISCOMFORT IN RIGHT FLANK: Primary | ICD-10-CM

## 2018-11-07 DIAGNOSIS — K56.7 ILEUS (HCC): ICD-10-CM

## 2018-11-07 LAB
ALBUMIN SERPL BCP-MCNC: 4 G/DL (ref 3.5–5.7)
ALP SERPL-CCNC: 55 U/L (ref 40–150)
ALT SERPL W P-5'-P-CCNC: 45 U/L (ref 7–52)
ANION GAP SERPL CALCULATED.3IONS-SCNC: 12 MMOL/L (ref 4–13)
AST SERPL W P-5'-P-CCNC: 27 U/L (ref 13–39)
BILIRUB SERPL-MCNC: 0.5 MG/DL (ref 0.2–1)
BUN SERPL-MCNC: 10 MG/DL (ref 7–25)
CALCIUM SERPL-MCNC: 9.2 MG/DL (ref 8.6–10.5)
CHLORIDE SERPL-SCNC: 101 MMOL/L (ref 98–107)
CO2 SERPL-SCNC: 27 MMOL/L (ref 21–31)
CREAT SERPL-MCNC: 0.72 MG/DL (ref 0.7–1.3)
GFR SERPL CREATININE-BSD FRML MDRD: 132 ML/MIN/1.73SQ M
GLUCOSE P FAST SERPL-MCNC: 83 MG/DL (ref 65–99)
MAGNESIUM SERPL-MCNC: 2.1 MG/DL (ref 1.9–2.7)
PHOSPHATE SERPL-MCNC: 3.8 MG/DL (ref 3–5.5)
POTASSIUM SERPL-SCNC: 3.8 MMOL/L (ref 3.5–5.5)
PROT SERPL-MCNC: 6.5 G/DL (ref 6.4–8.9)
SODIUM SERPL-SCNC: 140 MMOL/L (ref 134–143)

## 2018-11-07 PROCEDURE — 80053 COMPREHEN METABOLIC PANEL: CPT

## 2018-11-07 PROCEDURE — 99214 OFFICE O/P EST MOD 30 MIN: CPT | Performed by: PHYSICIAN ASSISTANT

## 2018-11-07 PROCEDURE — 84100 ASSAY OF PHOSPHORUS: CPT

## 2018-11-07 PROCEDURE — 74018 RADEX ABDOMEN 1 VIEW: CPT

## 2018-11-07 PROCEDURE — 36415 COLL VENOUS BLD VENIPUNCTURE: CPT

## 2018-11-07 PROCEDURE — 1111F DSCHRG MED/CURRENT MED MERGE: CPT | Performed by: PHYSICIAN ASSISTANT

## 2018-11-07 PROCEDURE — 83735 ASSAY OF MAGNESIUM: CPT

## 2018-11-07 NOTE — PATIENT INSTRUCTIONS
Please use ice 20 min on/20 min off on R side  You may safely use tylenol 500 mg every 4-6 hours as needed  Please try to walk around and stretch several times daily  Please go for labs and xray of abd

## 2018-11-07 NOTE — PROGRESS NOTES
Assessment/Plan:      Diagnoses and all orders for this visit:    Abdominal discomfort in right flank  -     XR abdomen 1 view kub; Future  - clinical picture and history actually more consistent with abd muscle spasm/twictching - recommend ice, tylenol, stretching and increase physical activity, walk around house several times per day; monitor electrolytes that were repleted in hospital given still with reduced oral intake from norm   - bowel sounds reduced upon exam so will monitor persistent ilues with xray; however reassuring that he is having BMs and flatus, tolerating liquid/soft diet; benign abd exam otherwise, no pain illicit during palpation currently   - patient has follow up with psych next week, will discuss current medication as could be culprit for ileus  - keep follow up for scheduled TCM next week as well     Ileus (HCC)  -     XR abdomen 1 view kub; Future  -     Comprehensive metabolic panel; Future    Decreased oral intake  -     Comprehensive metabolic panel; Future  -     Magnesium; Future  -     Phosphorus; Future    Hypokalemia  -     Comprehensive metabolic panel; Future    Hypomagnesemia  -     Magnesium; Future    Hypophosphatemia  -     Phosphorus; Future          Subjective:     Patient ID: Kiana Epstein is a 26 y o  male  Chief Complaint   Patient presents with    Muscle Pain     right side between breat and hip     HPI   Patient is a 25 yo male who presents for R sided abd pain since recent discharge from hospital 2/2 to ileus and viral gastroenteritis  He was discharged 2 days prior tolerating liquid/soft diet and having BMs/flatus after bowel rest, NG tube and IVF  Since discharge, he reports being able to have BMs, went this morning and reports on softer side  He is passing flatus  He has no nausea or vomiting, indigestion, fevers, dysuria  He has an appetite but is continuing on liquid or soft foods such as eggs   He points to the abd pain on R lateral side of abdomen nearly on flank and reports that it is a discomfort that is produced with prolonged sitting and mom reports it appears as if you can see it twitching  He did report that he felt sore from lying in hospital for several days  He is not as active as usual since discharge  He has not tried anything for the pain as was not sure what he could take  Mom reports PT/OT will be starting on Thursday as well  Review of Systems   Constitutional: Positive for activity change (reduced ) and fatigue  Negative for appetite change, chills and fever  HENT: Negative  Respiratory: Negative  Cardiovascular: Negative  Gastrointestinal: Positive for abdominal pain (R side as noted in hpi)  Negative for blood in stool, constipation, diarrhea, nausea and vomiting  Genitourinary: Negative  Musculoskeletal: Negative  Skin: Negative  Neurological: Negative  Hematological: Negative          Patient Active Problem List    Diagnosis Date Noted    Hypophosphatemia 11/03/2018    Hypomagnesemia 11/03/2018    Hypokalemia 11/03/2018    Ileus ([facility] [address] ) 11/01/2018    Acute gastroenteritis 11/01/2018    Acute dehydration 11/01/2018    Lactic acidosis 11/01/2018    Tachycardia 46/97/2584    Periumbilical abdominal pain 11/01/2018    Abnormal EKG 01/06/2018    Eczema 12/28/2017    Essential hypertriglyceridemia 02/01/2017    Obese 10/05/2016    PDD (pervasive developmental disorder) 08/30/2016    Chronic schizophreniform disorder ([facility] [address] ) 08/30/2016    Left-sided Bell's palsy 07/10/2015    Vitamin D deficiency 04/13/2015    Hyperlipidemia 03/30/2015    Plantar warts 07/08/2014    Sleep apnea 04/08/2014    Autistic disorder, active 11/25/2013    Depression 11/25/2013     Current Outpatient Prescriptions:     clomiPRAMINE (ANAFRANIL) 50 mg capsule, Take 2 capsules by mouth, Disp: , Rfl:     cloZAPine (CLOZARIL) 100 mg tablet, Take 2 tablets by mouth, Disp: , Rfl:     fluticasone (FLONASE) 50 mcg/act nasal spray, 1 spray into each nostril daily, Disp: 16 g, Rfl: 0    loratadine (CLARITIN) 10 mg tablet, Take 1 tablet (10 mg total) by mouth daily, Disp: 30 tablet, Rfl: 0      Objective:  Vitals:    11/07/18 0949   BP: 112/86   Pulse: 93   Temp: 97 9 °F (36 6 °C)   SpO2: 97%        Physical Exam   Constitutional: He is oriented to person, place, and time  He appears well-developed and well-nourished  No distress  HENT:   Head: Normocephalic and atraumatic  Right Ear: External ear normal    Left Ear: External ear normal    Nose: Nose normal    Mouth/Throat: Oropharynx is clear and moist    Eyes: Pupils are equal, round, and reactive to light  Conjunctivae are normal    Neck: Normal range of motion  Neck supple  Cardiovascular: Normal rate, regular rhythm, normal heart sounds and intact distal pulses  No murmur heard  Pulmonary/Chest: Effort normal and breath sounds normal  No respiratory distress  He has no wheezes  He has no rales  Abdominal: Soft  Normal appearance  There is no tenderness  There is no CVA tenderness  Where patient points to pain which is not current on exam; reduced bowel sounds; nontender to palpation along flank or abd currently    Musculoskeletal: Normal range of motion  He exhibits no edema  Neurological: He is alert and oriented to person, place, and time  Skin: Skin is warm and dry  No rash noted  Psychiatric: He has a normal mood and affect

## 2018-11-14 ENCOUNTER — TELEPHONE (OUTPATIENT)
Dept: FAMILY MEDICINE CLINIC | Facility: CLINIC | Age: 23
End: 2018-11-14

## 2018-11-19 ENCOUNTER — TELEPHONE (OUTPATIENT)
Dept: FAMILY MEDICINE CLINIC | Facility: CLINIC | Age: 23
End: 2018-11-19

## 2018-11-19 NOTE — TELEPHONE ENCOUNTER
Only an FYI for Wednesday appointment:     Priscila Villalba from PT is NOT recommending home care therapy but she is recommending outpatient therapy

## 2018-11-21 ENCOUNTER — OFFICE VISIT (OUTPATIENT)
Dept: FAMILY MEDICINE CLINIC | Facility: CLINIC | Age: 23
End: 2018-11-21
Payer: COMMERCIAL

## 2018-11-21 VITALS
HEART RATE: 125 BPM | SYSTOLIC BLOOD PRESSURE: 110 MMHG | BODY MASS INDEX: 35.93 KG/M2 | TEMPERATURE: 97.9 F | DIASTOLIC BLOOD PRESSURE: 64 MMHG | WEIGHT: 202.8 LBS | HEIGHT: 63 IN | OXYGEN SATURATION: 98 %

## 2018-11-21 DIAGNOSIS — Z92.89 HISTORY OF RECENT HOSPITALIZATION: Primary | ICD-10-CM

## 2018-11-21 DIAGNOSIS — Z74.09 DECREASED MOBILITY AND ENDURANCE: ICD-10-CM

## 2018-11-21 DIAGNOSIS — R94.31 ABNORMAL EKG: ICD-10-CM

## 2018-11-21 DIAGNOSIS — R00.0 TACHYCARDIA: Chronic | ICD-10-CM

## 2018-11-21 DIAGNOSIS — R53.1 DECREASED STRENGTH: ICD-10-CM

## 2018-11-21 PROCEDURE — 3008F BODY MASS INDEX DOCD: CPT | Performed by: PHYSICIAN ASSISTANT

## 2018-11-21 PROCEDURE — 1036F TOBACCO NON-USER: CPT | Performed by: PHYSICIAN ASSISTANT

## 2018-11-21 PROCEDURE — 99214 OFFICE O/P EST MOD 30 MIN: CPT | Performed by: PHYSICIAN ASSISTANT

## 2018-11-21 RX ORDER — METOPROLOL SUCCINATE 25 MG/1
25 TABLET, EXTENDED RELEASE ORAL DAILY
Qty: 30 TABLET | Refills: 0 | Status: SHIPPED | OUTPATIENT
Start: 2018-11-21 | End: 2018-12-10 | Stop reason: DRUGHIGH

## 2018-11-21 NOTE — PROGRESS NOTES
Routine Follow-up    Eleonora Strickland 26 y o  male   Date:  11/21/2018      Assessment and Plan:    Aziza Jorgensen was seen today for follow-up  Diagnoses and all orders for this visit:    History of recent hospitalization  -     Ambulatory referral to Physical Therapy; Future  - ileus resolves, bowels normal     Decreased mobility and endurance  -     Ambulatory referral to Physical Therapy; Future    Decreased strength  -     Ambulatory referral to Physical Therapy; Future    Abnormal EKG  -     Echo complete with contrast if indicated; Future  -     metoprolol succinate (TOPROL-XL) 25 mg 24 hr tablet; Take 1 tablet (25 mg total) by mouth daily  -     Ambulatory referral to Cardiology; Future    Tachycardia  -     Echo complete with contrast if indicated; Future  -     metoprolol succinate (TOPROL-XL) 25 mg 24 hr tablet; Take 1 tablet (25 mg total) by mouth daily  -     Ambulatory referral to Cardiology; Future  - tsh, cmp cbc unremarkable   - ? Component of anxiousness  - will continue to monitor pulse manual at home, mom taught how to take   - follow up in 1-2 weeks       HPI:  Chief Complaint   Patient presents with    Follow-up     f/u from hospital     HPI  Patient is a 25 yo male who presents for follow up from recent hospitalization due to ilues  He has repeat xray on 11/7 which shows that it was resolved  He has no further abd pain, BMs are normal  He followed with psych and no changes made to chronic medicines  He has had  nusring care at the home with PT eval and was recommended to have outpatient PT to improve stability and strength  He has had elevated HR several times on past appts and he does admit sometimes he can appreciate his heart racing but then also states that it can also happen when nervous and anxious  He feels slightly anxious now  Manual HR in 110-120  He recent labs reveal normal cmp, tsh but EKG with sinus tachycardia  No chest pain   Sob      ROS: Review of Systems   Constitutional: Negative for chills, fatigue, fever and unexpected weight change  HENT: Negative for congestion, ear pain, hearing loss, nosebleeds, sore throat and trouble swallowing  Eyes: Negative for pain, discharge and visual disturbance  Respiratory: Negative for cough, shortness of breath and wheezing  Cardiovascular: Positive for palpitations (racing heart )  Negative for chest pain and leg swelling  Gastrointestinal: Negative for abdominal pain, blood in stool, constipation, diarrhea, nausea and vomiting  Endocrine: Negative for cold intolerance and heat intolerance  Genitourinary: Negative for difficulty urinating, dysuria and hematuria  Skin: Negative for color change, rash and wound  Neurological: Negative for dizziness, syncope, weakness, light-headedness and headaches  Hematological: Negative for adenopathy  Does not bruise/bleed easily         Past Medical History:   Diagnosis Date    Autism     Mononucleosis     Schizophrenia [facility])      Patient Active Problem List   Diagnosis    Abnormal EKG    Autistic disorder, active    Depression    Eczema    Essential hypertriglyceridemia    Hyperlipidemia    Left-sided Bell's palsy    Obese    PDD (pervasive developmental disorder)    Plantar warts    Chronic schizophreniform disorder (HCC)    Sleep apnea    Vitamin D deficiency    Ileus (HCC)    Acute gastroenteritis    Acute dehydration    Lactic acidosis    Tachycardia    Periumbilical abdominal pain    Hypophosphatemia    Hypomagnesemia    Hypokalemia       Past Surgical History:   Procedure Laterality Date    DENTAL SURGERY      TONSILLECTOMY AND ADENOIDECTOMY      Last assessed 7/8/2014       Social History     Social History    Marital status: Single     Spouse name: N/A    Number of children: N/A    Years of education: N/A     Social History Main Topics    Smoking status: Never Smoker    Smokeless tobacco: Never Used    Alcohol use No    Drug use: No    Sexual activity: Not Asked     Other Topics Concern    None     Social History Narrative    Uses seatbelts       Family History   Problem Relation Age of Onset    No Known Problems Father     Cancer Family     Diabetes Family     Hyperlipidemia Family     Stroke Family        Allergies   Allergen Reactions    Prednisone      DEPRESSION         Current Outpatient Prescriptions:     clomiPRAMINE (ANAFRANIL) 50 mg capsule, Take 2 capsules by mouth, Disp: , Rfl:     cloZAPine (CLOZARIL) 100 mg tablet, Take 2 tablets by mouth, Disp: , Rfl:     fluticasone (FLONASE) 50 mcg/act nasal spray, 1 spray into each nostril daily, Disp: 16 g, Rfl: 0    loratadine (CLARITIN) 10 mg tablet, Take 1 tablet (10 mg total) by mouth daily, Disp: 30 tablet, Rfl: 0    metoprolol succinate (TOPROL-XL) 25 mg 24 hr tablet, Take 1 tablet (25 mg total) by mouth daily, Disp: 30 tablet, Rfl: 0      Physical Exam:  /64 (BP Location: Left arm, Patient Position: Sitting, Cuff Size: Standard)   Pulse (!) 125   Temp 97 9 °F (36 6 °C) (Tympanic)   Ht 5' 3" (1 6 m)   Wt 92 kg (202 lb 12 8 oz)   SpO2 98%   BMI 35 92 kg/m²     Physical Exam   Constitutional: He is oriented to person, place, and time  Vital signs are normal  He appears well-developed and well-nourished  No distress  HENT:   Head: Normocephalic and atraumatic  Right Ear: Tympanic membrane, external ear and ear canal normal    Left Ear: Tympanic membrane, external ear and ear canal normal    Nose: Nose normal    Mouth/Throat: Oropharynx is clear and moist    Eyes: Pupils are equal, round, and reactive to light  Conjunctivae and lids are normal    Neck: Trachea normal and normal range of motion  Neck supple  No thyromegaly present  Cardiovascular: Regular rhythm, S1 normal, S2 normal and intact distal pulses  Tachycardia present  Exam reveals no gallop  No murmur heard  Pulmonary/Chest: Breath sounds normal  No respiratory distress  He has no wheezes  He has no rhonchi   He has no rales    Abdominal: Soft  Normal appearance and bowel sounds are normal  He exhibits no mass  There is no hepatosplenomegaly  There is no tenderness  Musculoskeletal: Normal range of motion  He exhibits no edema or deformity  Lymphadenopathy:     He has no cervical adenopathy  Neurological: He is alert and oriented to person, place, and time  He has normal reflexes  No cranial nerve deficit or sensory deficit  Skin: Skin is warm and dry  No rash noted  No cyanosis  No pallor  Nails show no clubbing  Psychiatric: He has a normal mood and affect   His behavior is normal  Cognition and memory are normal          Labs:  Lab Results   Component Value Date    WBC 8 00 11/05/2018    HGB 13 4 (L) 11/05/2018    HCT 40 5 11/05/2018    MCV 92 11/05/2018     11/05/2018     Lab Results   Component Value Date    K 3 8 11/07/2018     11/07/2018    CO2 27 11/07/2018    BUN 10 11/07/2018    CREATININE 0 72 11/07/2018    GLUF 83 11/07/2018    CALCIUM 9 2 11/07/2018    AST 27 11/07/2018    ALT 45 11/07/2018    ALKPHOS 55 11/07/2018    EGFR 132 11/07/2018

## 2018-12-10 ENCOUNTER — CONSULT (OUTPATIENT)
Dept: CARDIOLOGY CLINIC | Facility: CLINIC | Age: 23
End: 2018-12-10
Payer: COMMERCIAL

## 2018-12-10 VITALS
SYSTOLIC BLOOD PRESSURE: 90 MMHG | HEIGHT: 62 IN | BODY MASS INDEX: 38.28 KG/M2 | DIASTOLIC BLOOD PRESSURE: 60 MMHG | WEIGHT: 208 LBS | HEART RATE: 110 BPM

## 2018-12-10 DIAGNOSIS — R00.0 TACHYCARDIA: Primary | Chronic | ICD-10-CM

## 2018-12-10 DIAGNOSIS — R94.31 ABNORMAL EKG: ICD-10-CM

## 2018-12-10 PROCEDURE — 99243 OFF/OP CNSLTJ NEW/EST LOW 30: CPT | Performed by: INTERNAL MEDICINE

## 2018-12-10 PROCEDURE — 93000 ELECTROCARDIOGRAM COMPLETE: CPT | Performed by: INTERNAL MEDICINE

## 2018-12-10 RX ORDER — METOPROLOL SUCCINATE 100 MG/1
100 TABLET, EXTENDED RELEASE ORAL DAILY
Qty: 90 TABLET | Refills: 5 | Status: SHIPPED | OUTPATIENT
Start: 2018-12-10 | End: 2019-08-30

## 2018-12-10 RX ORDER — CLOZAPINE 25 MG/1
TABLET ORAL
Refills: 0 | COMMUNITY
Start: 2018-12-08 | End: 2020-06-26

## 2018-12-10 NOTE — PATIENT INSTRUCTIONS
We will type this increase metoprolol to 100 mg daily as a trial   If you feel better and there is no tiredness on metoprolol then this would be a good medication for the long term  If not I would not continue this    All is assuming that the echocardiogram scheduled for later this week is normal

## 2018-12-10 NOTE — ASSESSMENT & PLAN NOTE
The patient states that he gets very nervous about medical encounters but that even without medical encounters he senses a fast heart rate  There has been no change since a small dose of beta-blocker was started  I would propose that we trial a bigger dose and see how he does  Certainly assuming left ventricular function is normal, beta-blocker is mainly for symptom relief at this point

## 2018-12-10 NOTE — PROGRESS NOTES
Patient ID: Jean Claude Saba is a 26 y o  male  Plan:      Tachycardia  The patient states that he gets very nervous about medical encounters but that even without medical encounters he senses a fast heart rate  There has been no change since a small dose of beta-blocker was started  I would propose that we trial a bigger dose and see how he does  Certainly assuming left ventricular function is normal, beta-blocker is mainly for symptom relief at this point  Abnormal EKG  See comments regarding tachycardia  Follow up Plan:  Trial of 100 mg of metoprolol is prescribed  If there is improvement in general sense of tachycardia without resultant fatigue and the echocardiogram is normal then follow-up can be with his primary care physician  HPI:   The patient is seen today in consultation regarding an abnormal EKG and tachycardia  Patient was recently hospitalized for an ileus but surgery was not required  He was noted to have relative tachycardia then  Recently in the office the heart rate was quite high and consultation was requested  As well an echocardiogram was scheduled  This was not yet performed  The patient has no exertional chest pain or chest pressure  He spends much of his time in the house  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  Results for orders placed or performed in visit on 12/10/18   POCT ECG    Impression    Sinus tachycardia  Otherwise WNL  Other cardiac testing:  Echo scheduled to be performed in 2 days      Past Surgical History:   Procedure Laterality Date    DENTAL SURGERY      TONSILLECTOMY AND ADENOIDECTOMY      Last assessed 7/8/2014     CMP:   Lab Results   Component Value Date    K 3 8 11/07/2018     11/07/2018    CO2 27 11/07/2018    BUN 10 11/07/2018    CREATININE 0 72 11/07/2018    EGFR 132 11/07/2018       Lipid Profile: No results found for: CHOL, TRIG, HDL, LDL      Review of Systems   10  point ROS  was otherwise non pertinent or negative except as per HPI or as below  Gait:  Normal         Objective:     BP 90/60   Pulse (!) 110   Ht 5' 2" (1 575 m)   Wt 94 3 kg (208 lb)   BMI 38 04 kg/m²     PHYSICAL EXAM:    General:  Normal appearance in no distress  Eyes:  Anicteric  Oral mucosa:  Moist   Neck:  No JVD  Carotid upstrokes are brisk without bruits  No masses  Chest:  Clear to auscultation and percussion  Cardiac:  Normal PMI  Normal S1 and S2  No murmur gallop or rub  Abdomen:  Soft and nontender  No palpable organomegaly or aortic enlargement  Extremities:  No peripheral edema  Musculoskeletal:  Symmetric  Vascular:  Femoral pulses are brisk without bruits  Popliteal pulses are intact bilaterally  Pedal pulses are intact  Neuro:  Grossly symmetric  Psych:  Alert and oriented x3          Current Outpatient Prescriptions:     clomiPRAMINE (ANAFRANIL) 50 mg capsule, Take 2 capsules by mouth, Disp: , Rfl:     cloZAPine (CLOZARIL) 100 mg tablet, Take 2 tablets by mouth, Disp: , Rfl:     cloZAPine (CLOZARIL) 25 mg tablet, take 1 tablet by mouth once daily (AT 8 AM), Disp: , Rfl: 0    fluticasone (FLONASE) 50 mcg/act nasal spray, 1 spray into each nostril daily, Disp: 16 g, Rfl: 0    loratadine (CLARITIN) 10 mg tablet, Take 1 tablet (10 mg total) by mouth daily, Disp: 30 tablet, Rfl: 0    metoprolol succinate (TOPROL-XL) 100 mg 24 hr tablet, Take 1 tablet (100 mg total) by mouth daily, Disp: 90 tablet, Rfl: 5  Allergies   Allergen Reactions    Prednisone      DEPRESSION     Past Medical History:   Diagnosis Date    Autism     Mononucleosis     Schizophrenia ([facility] [address] )     Tachycardia 12/10/2018           History   Smoking Status    Never Smoker   Smokeless Tobacco    Never Used

## 2018-12-11 ENCOUNTER — OFFICE VISIT (OUTPATIENT)
Dept: FAMILY MEDICINE CLINIC | Facility: CLINIC | Age: 23
End: 2018-12-11
Payer: COMMERCIAL

## 2018-12-11 VITALS
BODY MASS INDEX: 37.98 KG/M2 | HEART RATE: 114 BPM | DIASTOLIC BLOOD PRESSURE: 88 MMHG | TEMPERATURE: 97.5 F | WEIGHT: 206.4 LBS | SYSTOLIC BLOOD PRESSURE: 122 MMHG | HEIGHT: 62 IN | OXYGEN SATURATION: 97 %

## 2018-12-11 DIAGNOSIS — R00.0 TACHYCARDIA: Primary | Chronic | ICD-10-CM

## 2018-12-11 PROCEDURE — 99213 OFFICE O/P EST LOW 20 MIN: CPT | Performed by: PHYSICIAN ASSISTANT

## 2018-12-11 PROCEDURE — 1036F TOBACCO NON-USER: CPT | Performed by: PHYSICIAN ASSISTANT

## 2018-12-11 PROCEDURE — 3008F BODY MASS INDEX DOCD: CPT | Performed by: PHYSICIAN ASSISTANT

## 2018-12-11 NOTE — PROGRESS NOTES
Routine Follow-up    Maxime Hurtado 26 y o  male   Date:  12/11/2018      Assessment and Plan:    Maryellen Truong was seen today for follow-up  Diagnoses and all orders for this visit:    Tachycardia  - unremarkable labs, EKG with sinus tach  - awaiting echo which will be completed tomorrow  - appreciate cardiology consult - who increased metoprolol xl to 100 mg daily which he has not started yet, return on nurses schedule for HR check in 1-2 weeks after starting new dose       HPI:  Chief Complaint   Patient presents with    Follow-up     HPI   Patient is a 27 yo male who presents for 2 week follow up after starting low dose metoprolol 25 mg for sinus tachcardia  He has his echo scheduled for tomorrow  He followed with cardiology and they recommended increasing to metoprolol 100 mg daily  His HR is still above normal  However he denies chest pain, less feeling of heart racing  He is tolerating it well  Unfortunately, he did not start 100 mg dose to be able to asses improvement  No other concerns  ROS: Review of Systems   Constitutional: Negative  Respiratory: Negative  Cardiovascular: Negative for chest pain and leg swelling  Palpitations: improved feeling of racing heart  Gastrointestinal: Negative  Skin: Negative  Neurological: Negative          Past Medical History:   Diagnosis Date    Autism     Mononucleosis     [facility] ([facility] [address] [facility]     Tachycardia 12/10/2018     Patient Active Problem List   Diagnosis    Abnormal EKG    Autistic disorder, active    Depression    Eczema    Essential hypertriglyceridemia    Hyperlipidemia    Left-sided Bell's palsy    Obese    PDD (pervasive developmental disorder)    Plantar warts    Chronic schizophreniform disorder (HCC)    Sleep apnea    Vitamin D deficiency    Ileus (HCC)    Acute gastroenteritis    Acute dehydration    Lactic acidosis    Tachycardia    Periumbilical abdominal pain    Hypophosphatemia    Hypomagnesemia    Hypokalemia Past Surgical History:   Procedure Laterality Date    DENTAL SURGERY      TONSILLECTOMY AND ADENOIDECTOMY      Last assessed 7/8/2014       Social History     Social History    Marital status: Single     Spouse name: N/A    Number of children: N/A    Years of education: N/A     Social History Main Topics    Smoking status: Never Smoker    Smokeless tobacco: Never Used    Alcohol use No    Drug use: No    Sexual activity: Not Asked     Other Topics Concern    None     Social History Narrative    Uses seatbelts       Family History   Problem Relation Age of Onset    No Known Problems Father     Cancer Family     Diabetes Family     Hyperlipidemia Family     Stroke Family        Allergies   Allergen Reactions    Prednisone      DEPRESSION         Current Outpatient Prescriptions:     clomiPRAMINE (ANAFRANIL) 50 mg capsule, Take 2 capsules by mouth, Disp: , Rfl:     cloZAPine (CLOZARIL) 100 mg tablet, Take 2 tablets by mouth, Disp: , Rfl:     cloZAPine (CLOZARIL) 25 mg tablet, take 1 tablet by mouth once daily (AT 8 AM), Disp: , Rfl: 0    fluticasone (FLONASE) 50 mcg/act nasal spray, 1 spray into each nostril daily, Disp: 16 g, Rfl: 0    loratadine (CLARITIN) 10 mg tablet, Take 1 tablet (10 mg total) by mouth daily, Disp: 30 tablet, Rfl: 0    metoprolol succinate (TOPROL-XL) 100 mg 24 hr tablet, Take 1 tablet (100 mg total) by mouth daily, Disp: 90 tablet, Rfl: 5      Physical Exam:  /88 (BP Location: Left arm, Patient Position: Sitting, Cuff Size: Standard)   Pulse (!) 114   Temp 97 5 °F (36 4 °C) (Tympanic)   Ht 5' 2" (1 575 m)   Wt 93 6 kg (206 lb 6 4 oz)   SpO2 97%   BMI 37 75 kg/m²     Physical Exam   Constitutional: He is oriented to person, place, and time  He appears well-developed and well-nourished  Cardiovascular: Regular rhythm, normal heart sounds and intact distal pulses  Tachycardia present  Exam reveals no gallop  No murmur heard    Pulmonary/Chest: Effort normal and breath sounds normal  No respiratory distress  He has no wheezes  He has no rales  Neurological: He is alert and oriented to person, place, and time  No cranial nerve deficit  Skin: Skin is warm and dry  No rash noted  Psychiatric: He has a normal mood and affect   His behavior is normal          Labs:  Lab Results   Component Value Date    WBC 8 00 11/05/2018    HGB 13 4 (L) 11/05/2018    HCT 40 5 11/05/2018    MCV 92 11/05/2018     11/05/2018     Lab Results   Component Value Date    K 3 8 11/07/2018     11/07/2018    CO2 27 11/07/2018    BUN 10 11/07/2018    CREATININE 0 72 11/07/2018    GLUF 83 11/07/2018    CALCIUM 9 2 11/07/2018    AST 27 11/07/2018    ALT 45 11/07/2018    ALKPHOS 55 11/07/2018    EGFR 132 11/07/2018

## 2018-12-12 ENCOUNTER — HOSPITAL ENCOUNTER (OUTPATIENT)
Dept: NON INVASIVE DIAGNOSTICS | Facility: HOSPITAL | Age: 23
Discharge: HOME | End: 2018-12-12
Payer: COMMERCIAL

## 2018-12-12 DIAGNOSIS — R00.0 TACHYCARDIA: Chronic | ICD-10-CM

## 2018-12-12 DIAGNOSIS — R94.31 ABNORMAL EKG: ICD-10-CM

## 2018-12-12 PROCEDURE — 93306 TTE W/DOPPLER COMPLETE: CPT

## 2018-12-12 PROCEDURE — 93306 TTE W/DOPPLER COMPLETE: CPT | Performed by: INTERNAL MEDICINE

## 2018-12-31 ENCOUNTER — TELEPHONE (OUTPATIENT)
Dept: FAMILY MEDICINE CLINIC | Facility: CLINIC | Age: 23
End: 2018-12-31

## 2018-12-31 ENCOUNTER — TELEPHONE (OUTPATIENT)
Dept: CARDIOLOGY CLINIC | Facility: CLINIC | Age: 23
End: 2018-12-31

## 2018-12-31 NOTE — TELEPHONE ENCOUNTER
Patient's mother called  The patient is having lightheadedness/dizziness when he stands up, since he was increased from Toprol 25 mg to 100 mg  Otherwise, feeling okay  His pulse rate and BP has been okay at physical therapy sessions  Please advise

## 2018-12-31 NOTE — TELEPHONE ENCOUNTER
Mother, Tomasa Nation, called  1)  Metoprolol:  Dosage increased to 100mg by Dr Montes/Cardiology  She stated patient is feeling lightheaded on the higher dose  Can she cut in half? I stated that she needed to ask Dr Andreas Pena that question, since he increased the dose  She stated she called there but there was no answer, no service or voicemail  I asked her to try again and if still no answer I will give her number for  Cardiology  2)  She was not able to bring patient in for heart rate check on 12/19/18  Since he has been to the cardiologist and has physical therapy for 2 times per week (holiday weeks it has been one time per week) and they monitor his heart rate, does he need to reschedule that nurse visit? Tomasa Nation is aware you will not see this until 1/2/19  If need be, she will call back regarding the medication question/cardiology; and I can call her back on 1/2 regarding the heart rate check

## 2019-01-02 NOTE — TELEPHONE ENCOUNTER
Patient can take half and continue to monitor symptoms; cardiology should be updated (has follow up this month with them already) and should reschedule follow up to monitor vitals

## 2019-01-03 NOTE — TELEPHONE ENCOUNTER
Spoke with Sairamarah Castillogladys, mother, who stated she did reach cardiology and they lowered the dose a little and the patient is feeling better  We scheduled vital check for 1/8/2019 on the nurse schedule

## 2019-01-08 ENCOUNTER — CLINICAL SUPPORT (OUTPATIENT)
Dept: FAMILY MEDICINE CLINIC | Facility: CLINIC | Age: 24
End: 2019-01-08
Payer: COMMERCIAL

## 2019-01-08 VITALS
HEIGHT: 62 IN | OXYGEN SATURATION: 97 % | DIASTOLIC BLOOD PRESSURE: 80 MMHG | SYSTOLIC BLOOD PRESSURE: 108 MMHG | TEMPERATURE: 98 F | BODY MASS INDEX: 38.35 KG/M2 | WEIGHT: 208.4 LBS | HEART RATE: 107 BPM

## 2019-01-08 DIAGNOSIS — R94.31 ABNORMAL EKG: ICD-10-CM

## 2019-01-08 PROCEDURE — 93784 AMBL BP MNTR W/SOFTWARE: CPT

## 2019-01-24 ENCOUNTER — OFFICE VISIT (OUTPATIENT)
Dept: CARDIOLOGY CLINIC | Facility: CLINIC | Age: 24
End: 2019-01-24
Payer: COMMERCIAL

## 2019-01-24 VITALS
WEIGHT: 213 LBS | DIASTOLIC BLOOD PRESSURE: 88 MMHG | HEART RATE: 100 BPM | HEIGHT: 62 IN | SYSTOLIC BLOOD PRESSURE: 120 MMHG | BODY MASS INDEX: 39.2 KG/M2

## 2019-01-24 DIAGNOSIS — R00.0 TACHYCARDIA: Primary | Chronic | ICD-10-CM

## 2019-01-24 PROBLEM — I42.0 DILATED CARDIOMYOPATHY (HCC): Status: ACTIVE | Noted: 2019-01-24

## 2019-01-24 PROCEDURE — 99213 OFFICE O/P EST LOW 20 MIN: CPT | Performed by: INTERNAL MEDICINE

## 2019-01-24 NOTE — PROGRESS NOTES
Patient ID: Fred Alcantara is a 26 y o  male  Plan:      Tachycardia  Heart rate is still high  Some of this is related to anxiety but we discussed trying to slowly tease up his metoprolol dose to 100 mg daily over the next few months  Dilated cardiomyopathy (HCC)  Mild  Possibly related to persistent tachycardia  Will reassess by echo in 1 year and in the meantime try to slow down his resting heart rate  Follow up Plan:  Six-month return visit  Will likely obtain an echocardiogram in 1 year  All discussed with the patient and his parents  HPI:  Leonarda Hernández is seen in follow-up today regarding tachycardia  I had increased his metoprolol dose from 25 mg daily to 100 mg daily  He had a fair amount of lightheadedness and thus the dose was cut back to 50 mg after a phone discussion  He does not feel too different than prior  No chest pain or chest pressure  No results found for this visit on 01/24/19  Most recent or relevant cardiac/vascular testing:    Echocardiogram 1/24/2019:  Mild global left ventricular systolic dysfunction with ejection fraction of 45%  EKG 12/10/2018:  Sinus tachycardia  Otherwise normal       Past Surgical History:   Procedure Laterality Date    DENTAL SURGERY      TONSILLECTOMY AND ADENOIDECTOMY      Last assessed 7/8/2014     CMP:   Lab Results   Component Value Date    K 3 8 11/07/2018     11/07/2018    CO2 27 11/07/2018    BUN 10 11/07/2018    CREATININE 0 72 11/07/2018    EGFR 132 11/07/2018       Lipid Profile: No results found for: CHOL, TRIG, HDL, LDL      Review of Systems   10  point ROS  was otherwise non pertinent or negative except as per HPI or as below  Gait: Normal        Objective:     /88   Pulse 100   Ht 5' 2" (1 575 m)   Wt 96 6 kg (213 lb)   BMI 38 96 kg/m²     PHYSICAL EXAM:    General:  Normal appearance in no distress  Eyes:  Anicteric  Oral mucosa:  Moist   Neck:  No JVD  Carotid upstrokes are brisk without bruits    No masses  Chest:  Clear to auscultation and percussion  Cardiac:  Normal PMI  Normal S1 and S2  No murmur gallop or rub  Abdomen:  Soft and nontender  No palpable organomegaly or aortic enlargement  Extremities:  No peripheral edema  Musculoskeletal:  Symmetric  Vascular:  Femoral pulses are brisk without bruits  Popliteal pulses are intact bilaterally  Pedal pulses are intact  Neuro:  Grossly symmetric  Psych:  Alert and oriented x3          Current Outpatient Prescriptions:     clomiPRAMINE (ANAFRANIL) 50 mg capsule, Take 2 capsules by mouth, Disp: , Rfl:     cloZAPine (CLOZARIL) 100 mg tablet, Take 2 tablets by mouth, Disp: , Rfl:     cloZAPine (CLOZARIL) 25 mg tablet, take 1 tablet by mouth once daily (AT 8 AM), Disp: , Rfl: 0    fluticasone (FLONASE) 50 mcg/act nasal spray, 1 spray into each nostril daily, Disp: 16 g, Rfl: 0    loratadine (CLARITIN) 10 mg tablet, Take 1 tablet (10 mg total) by mouth daily, Disp: 30 tablet, Rfl: 0    metoprolol succinate (TOPROL-XL) 100 mg 24 hr tablet, Take 1 tablet (100 mg total) by mouth daily (Patient taking differently: Take 50 mg by mouth daily  ), Disp: 90 tablet, Rfl: 5  Allergies   Allergen Reactions    Prednisone      DEPRESSION     Past Medical History:   Diagnosis Date    Autism     Mononucleosis     [facility]     Tachycardia 12/10/2018           History   Smoking Status    Never Smoker   Smokeless Tobacco    Never Used

## 2019-01-24 NOTE — ASSESSMENT & PLAN NOTE
Heart rate is still high  Some of this is related to anxiety but we discussed trying to slowly tease up his metoprolol dose to 100 mg daily over the next few months

## 2019-01-24 NOTE — ASSESSMENT & PLAN NOTE
Mild   Possibly related to persistent tachycardia  Will reassess by echo in 1 year and in the meantime try to slow down his resting heart rate

## 2019-04-10 ENCOUNTER — OFFICE VISIT (OUTPATIENT)
Dept: FAMILY MEDICINE CLINIC | Facility: CLINIC | Age: 24
End: 2019-04-10
Payer: COMMERCIAL

## 2019-04-10 VITALS
BODY MASS INDEX: 37.74 KG/M2 | WEIGHT: 213 LBS | DIASTOLIC BLOOD PRESSURE: 78 MMHG | HEART RATE: 108 BPM | OXYGEN SATURATION: 97 % | HEIGHT: 63 IN | RESPIRATION RATE: 17 BRPM | TEMPERATURE: 98.3 F | SYSTOLIC BLOOD PRESSURE: 114 MMHG

## 2019-04-10 DIAGNOSIS — E78.1 ESSENTIAL HYPERTRIGLYCERIDEMIA: Primary | ICD-10-CM

## 2019-04-10 DIAGNOSIS — R13.12 OROPHARYNGEAL DYSPHAGIA: ICD-10-CM

## 2019-04-10 PROCEDURE — 99214 OFFICE O/P EST MOD 30 MIN: CPT | Performed by: PHYSICIAN ASSISTANT

## 2019-04-10 RX ORDER — FENOFIBRATE 145 MG/1
145 TABLET, COATED ORAL DAILY
Qty: 90 TABLET | Refills: 0 | Status: SHIPPED | OUTPATIENT
Start: 2019-04-10 | End: 2020-06-26

## 2019-05-02 ENCOUNTER — OFFICE VISIT (OUTPATIENT)
Dept: FAMILY MEDICINE CLINIC | Facility: CLINIC | Age: 24
End: 2019-05-02
Payer: COMMERCIAL

## 2019-05-02 VITALS
WEIGHT: 214 LBS | RESPIRATION RATE: 16 BRPM | DIASTOLIC BLOOD PRESSURE: 84 MMHG | BODY MASS INDEX: 37.92 KG/M2 | HEIGHT: 63 IN | SYSTOLIC BLOOD PRESSURE: 122 MMHG | TEMPERATURE: 99.3 F | OXYGEN SATURATION: 98 % | HEART RATE: 118 BPM

## 2019-05-02 DIAGNOSIS — R00.0 TACHYCARDIA: ICD-10-CM

## 2019-05-02 DIAGNOSIS — R50.9 LOW GRADE FEVER: ICD-10-CM

## 2019-05-02 DIAGNOSIS — J01.00 ACUTE MAXILLARY SINUSITIS, RECURRENCE NOT SPECIFIED: Primary | ICD-10-CM

## 2019-05-02 PROCEDURE — 3008F BODY MASS INDEX DOCD: CPT | Performed by: FAMILY MEDICINE

## 2019-05-02 PROCEDURE — 99213 OFFICE O/P EST LOW 20 MIN: CPT | Performed by: FAMILY MEDICINE

## 2019-05-02 RX ORDER — AMOXICILLIN AND CLAVULANATE POTASSIUM 875; 125 MG/1; MG/1
1 TABLET, FILM COATED ORAL EVERY 12 HOURS SCHEDULED
Qty: 14 TABLET | Refills: 0 | Status: SHIPPED | OUTPATIENT
Start: 2019-05-02 | End: 2019-05-09

## 2019-05-02 RX ORDER — OMEPRAZOLE 20 MG/1
20 CAPSULE, DELAYED RELEASE ORAL
Refills: 0 | COMMUNITY
Start: 2019-04-18

## 2019-05-07 ENCOUNTER — TELEPHONE (OUTPATIENT)
Dept: FAMILY MEDICINE CLINIC | Facility: CLINIC | Age: 24
End: 2019-05-07

## 2019-05-23 ENCOUNTER — OFFICE VISIT (OUTPATIENT)
Dept: FAMILY MEDICINE CLINIC | Facility: CLINIC | Age: 24
End: 2019-05-23
Payer: COMMERCIAL

## 2019-05-23 VITALS
TEMPERATURE: 98.7 F | HEART RATE: 107 BPM | BODY MASS INDEX: 38.26 KG/M2 | DIASTOLIC BLOOD PRESSURE: 82 MMHG | SYSTOLIC BLOOD PRESSURE: 118 MMHG | WEIGHT: 216 LBS | OXYGEN SATURATION: 97 % | RESPIRATION RATE: 17 BRPM

## 2019-05-23 DIAGNOSIS — I42.0 DILATED CARDIOMYOPATHY (HCC): ICD-10-CM

## 2019-05-23 DIAGNOSIS — F84.9 PDD (PERVASIVE DEVELOPMENTAL DISORDER): ICD-10-CM

## 2019-05-23 DIAGNOSIS — M25.572 ACUTE LEFT ANKLE PAIN: ICD-10-CM

## 2019-05-23 DIAGNOSIS — Z02.5 ENCOUNTER FOR EXAMINATION TO PARTICIPATE IN SPECIAL OLYMPICS: ICD-10-CM

## 2019-05-23 DIAGNOSIS — R00.0 TACHYCARDIA: Primary | Chronic | ICD-10-CM

## 2019-05-23 PROBLEM — K52.9 ACUTE GASTROENTERITIS: Chronic | Status: RESOLVED | Noted: 2018-11-01 | Resolved: 2019-05-23

## 2019-05-23 PROBLEM — E86.0 ACUTE DEHYDRATION: Chronic | Status: RESOLVED | Noted: 2018-11-01 | Resolved: 2019-05-23

## 2019-05-23 PROBLEM — K56.7 ILEUS (HCC): Chronic | Status: RESOLVED | Noted: 2018-11-01 | Resolved: 2019-05-23

## 2019-05-23 PROCEDURE — 99214 OFFICE O/P EST MOD 30 MIN: CPT | Performed by: FAMILY MEDICINE

## 2019-08-30 ENCOUNTER — OFFICE VISIT (OUTPATIENT)
Dept: CARDIOLOGY CLINIC | Facility: CLINIC | Age: 24
End: 2019-08-30
Payer: COMMERCIAL

## 2019-08-30 ENCOUNTER — HOSPITAL ENCOUNTER (OUTPATIENT)
Dept: NON INVASIVE DIAGNOSTICS | Facility: CLINIC | Age: 24
Discharge: HOME | End: 2019-08-30
Payer: COMMERCIAL

## 2019-08-30 VITALS
SYSTOLIC BLOOD PRESSURE: 106 MMHG | HEIGHT: 63 IN | HEART RATE: 111 BPM | WEIGHT: 226 LBS | DIASTOLIC BLOOD PRESSURE: 70 MMHG | BODY MASS INDEX: 40.04 KG/M2

## 2019-08-30 DIAGNOSIS — R00.0 TACHYCARDIA: ICD-10-CM

## 2019-08-30 DIAGNOSIS — I42.0 DILATED CARDIOMYOPATHY (HCC): ICD-10-CM

## 2019-08-30 DIAGNOSIS — R00.2 PALPITATIONS: ICD-10-CM

## 2019-08-30 DIAGNOSIS — R00.2 PALPITATION: ICD-10-CM

## 2019-08-30 DIAGNOSIS — R94.31 ABNORMAL EKG: ICD-10-CM

## 2019-08-30 DIAGNOSIS — R00.0 TACHYCARDIA: Primary | Chronic | ICD-10-CM

## 2019-08-30 PROCEDURE — 93000 ELECTROCARDIOGRAM COMPLETE: CPT | Performed by: PHYSICIAN ASSISTANT

## 2019-08-30 PROCEDURE — 99214 OFFICE O/P EST MOD 30 MIN: CPT | Performed by: PHYSICIAN ASSISTANT

## 2019-08-30 PROCEDURE — 93225 XTRNL ECG REC<48 HRS REC: CPT

## 2019-08-30 PROCEDURE — 93226 XTRNL ECG REC<48 HR SCAN A/R: CPT

## 2019-08-30 RX ORDER — METOPROLOL SUCCINATE 100 MG/1
100 TABLET, EXTENDED RELEASE ORAL 2 TIMES DAILY
Qty: 60 TABLET | Refills: 5 | Status: SHIPPED | OUTPATIENT
Start: 2019-08-30 | End: 2019-10-15 | Stop reason: SDUPTHER

## 2019-08-30 NOTE — ASSESSMENT & PLAN NOTE
EF on last Echo is 45%   Thought to be tachy-mediated  On  mg  HR not optimal   I am going to have him take 50 in the am and 100 in the morning

## 2019-08-30 NOTE — PROGRESS NOTES
[facility]   Outpatient Note  Salinas Crawford  1995  [id number]  [facility]id Hayes  [address] [place] [address]  Rashid Hayes [place] [id number] [phone]    Salinas Crawford is a 22 y o  male    Assessment and Plan:     Problem List Items Addressed This Visit        Cardiovascular and Mediastinum    Dilated cardiomyopathy ([address] )     EF on last Echo is 45%   Thought to be tachy-mediated  On  mg  HR not optimal   I am going to have him take 50 in the am and 100 in the morning          Relevant Medications    metoprolol succinate (TOPROL-XL) 100 mg 24 hr tablet       Other    Tachycardia - Primary (Chronic)     Tachycardia maybe secondary to Anafranil which he is taking for schizophrenia  Since his symptoms are more prominent at night, I am going to having him try to take his BB in the evening  I will check a holter and an echo to be sure there is no malicious arrhythmia or structural cardiac abnormality is present  Relevant Medications    metoprolol succinate (TOPROL-XL) 100 mg 24 hr tablet    Other Relevant Orders    POCT ECG (Completed)    Echo complete with contrast if indicated    Holter monitor - 48 hour    Abnormal EKG     Sinus tachycardia otherwise normal  Thyroid levels, cbc and chemistries normal         Palpitations     prominent at night   See comments           Other Visit Diagnoses     Palpitation        Relevant Orders    Echo complete with contrast if indicated    Holter monitor - 48 hour           Additional Plan:   Medication changes as detailed above  Surveillance testing as orders outline  Return visit will be in one month or earlier if there are problems  The patient is encouraged to call in the meantime if there are questions or concerns  Subjective: The patient is seen in the office for complaints palpitations and pounding heart at night    He has a known history of CM that was thought to be tachycardia mediated  He is on anafranil for schizophrenia  that may be causing his tachycardia as it is not responding to high dose BB  He is otherwise doing well from a cardiac perspective without complaints of chest pain or exertional dyspnea  There are no complaints of palpitations syncope or near syncope  He denies edema orthopnea or PND  The patient denies TIA or CVA symptoms           Social History  Social History     Tobacco Use   Smoking Status Never Smoker   Smokeless Tobacco Never Used   ,   Social History     Substance and Sexual Activity   Alcohol Use No   ,   Social History     Substance and Sexual Activity   Drug Use No     Family History   Problem Relation Age of Onset    No Known Problems Father     Cancer Family     Diabetes Family     Hyperlipidemia Family     Stroke Family        Medical and Surgical History  Past Medical History:   Diagnosis Date    Autism     [facility]     Mononucleosis     Schizophrenia [facility]     Tachycardia 12/10/2018     Past Surgical History:   Procedure Laterality Date    DENTAL SURGERY      TONSILLECTOMY AND ADENOIDECTOMY      Last assessed 7/8/2014         Current Outpatient Medications:     clomiPRAMINE (ANAFRANIL) 50 mg capsule, Take 2 capsules by mouth, Disp: , Rfl:     cloZAPine (CLOZARIL) 100 mg tablet, Take 2 tablets by mouth, Disp: , Rfl:     cloZAPine (CLOZARIL) 25 mg tablet, take 1 tablet by mouth once daily (AT 8 AM), Disp: , Rfl: 0    omeprazole (PriLOSEC) 20 mg delayed release capsule, Take by mouth daily, Disp: , Rfl: 0    fenofibrate (TRICOR) 145 mg tablet, Take 1 tablet (145 mg total) by mouth daily (Patient not taking: Reported on 8/30/2019), Disp: 90 tablet, Rfl: 0    fluticasone (FLONASE) 50 mcg/act nasal spray, 1 spray into each nostril daily (Patient not taking: Reported on 8/30/2019), Disp: 16 g, Rfl: 0    loratadine (CLARITIN) 10 mg tablet, Take 1 tablet (10 mg total) by mouth daily (Patient not taking: Reported on 8/30/2019), Disp: 30 tablet, Rfl: 0    metoprolol succinate (TOPROL-XL) 100 mg 24 hr tablet, Take 1 tablet (100 mg total) by mouth 2 (two) times a day Take 50 mg in am and 100 in the evening, Disp: 60 tablet, Rfl: 5  Allergies   Allergen Reactions    Prednisone      DEPRESSION       Review of Systems   Constitution: Positive for diaphoresis  HENT: Negative  Eyes: Negative  Cardiovascular: Positive for palpitations (fast hearrate at night)  Negative for chest pain, claudication, cyanosis, dyspnea on exertion, irregular heartbeat, leg swelling, near-syncope, orthopnea, paroxysmal nocturnal dyspnea and syncope  Respiratory: Negative  Negative for cough, hemoptysis, shortness of breath, sleep disturbances due to breathing, snoring, sputum production and wheezing  Endocrine: Negative  Hematologic/Lymphatic: Negative  Skin: Negative  Musculoskeletal: Negative  Gastrointestinal: Negative  Genitourinary: Negative  Neurological: Negative  Psychiatric/Behavioral: Negative  Allergic/Immunologic: Negative  Objective:   /70   Pulse (!) 111   Ht 5' 3" (1 6 m)   Wt 103 kg (226 lb)   BMI 40 03 kg/m²   Physical Exam   Constitutional: He is oriented to person, place, and time  He appears well-developed and well-nourished  HENT:   Head: Normocephalic and atraumatic  Mouth/Throat: Oropharynx is clear and moist    Eyes: Conjunctivae are normal  No scleral icterus  Neck: Normal range of motion  Neck supple  No JVD present  No thyromegaly present  Cardiovascular: Regular rhythm and normal heart sounds  Tachycardia present  Exam reveals no gallop and no friction rub  No murmur heard  Flow murmur    Pulmonary/Chest: No respiratory distress  He has no wheezes  He has no rales  Abdominal: Soft  Bowel sounds are normal  He exhibits no distension  There is no tenderness  Aorta not palpable   Musculoskeletal: Normal range of motion   He exhibits no edema, tenderness or deformity  Neurological: He is alert and oriented to person, place, and time  Skin: Skin is warm and dry  Psychiatric: He has a normal mood and affect  His behavior is normal    Nursing note and vitals reviewed  Lab Review:   No results found for: CHOL  No results found for: HDL  No results found for: LDLCALC  No results found for: TRIG  Results Reviewed     None        Results Reviewed     None        Results Reviewed     None          Recent Cardiovascular Testing:   ECHO: LEFT VENTRICLE:  Size was at the upper limits of normal   Systolic function was mildly reduced  Ejection fraction was estimated to be 45 %   There were no regional wall motion abnormalities    ECG Review:   Sinus tachycardia , otherwise normal

## 2019-08-30 NOTE — ASSESSMENT & PLAN NOTE
Tachycardia maybe secondary to Anafranil which he is taking for schizophrenia  Since his symptoms are more prominent at night, I am going to having him try to take his BB in the evening  I will check a holter and an echo to be sure there is no malicious arrhythmia or structural cardiac abnormality is present

## 2019-08-30 NOTE — PATIENT INSTRUCTIONS
take 1/2 tablet of Metoprolol  in the morning 50 mg and   Change metoprolol 100 mg to the evening  Holter  Echo  Return in one  Month

## 2019-09-04 ENCOUNTER — OFFICE VISIT (OUTPATIENT)
Dept: URGENT CARE | Facility: CLINIC | Age: 24
End: 2019-09-04
Payer: COMMERCIAL

## 2019-09-04 VITALS
DIASTOLIC BLOOD PRESSURE: 80 MMHG | OXYGEN SATURATION: 95 % | HEART RATE: 108 BPM | TEMPERATURE: 98.7 F | BODY MASS INDEX: 40.04 KG/M2 | WEIGHT: 226 LBS | SYSTOLIC BLOOD PRESSURE: 132 MMHG | RESPIRATION RATE: 16 BRPM | HEIGHT: 63 IN

## 2019-09-04 DIAGNOSIS — J01.90 ACUTE SINUSITIS, RECURRENCE NOT SPECIFIED, UNSPECIFIED LOCATION: Primary | ICD-10-CM

## 2019-09-04 PROCEDURE — G0382 LEV 3 HOSP TYPE B ED VISIT: HCPCS | Performed by: PHYSICIAN ASSISTANT

## 2019-09-04 RX ORDER — AMOXICILLIN 875 MG/1
875 TABLET, COATED ORAL 2 TIMES DAILY
Qty: 20 TABLET | Refills: 0 | Status: SHIPPED | OUTPATIENT
Start: 2019-09-04 | End: 2019-09-14

## 2019-09-04 RX ORDER — FLUTICASONE PROPIONATE 50 MCG
2 SPRAY, SUSPENSION (ML) NASAL DAILY
Qty: 1 BOTTLE | Refills: 0 | Status: SHIPPED | OUTPATIENT
Start: 2019-09-04 | End: 2020-09-18 | Stop reason: SDUPTHER

## 2019-09-04 NOTE — PROGRESS NOTES
[facility]    NAME: Chelsie Lorenz is a 22 y o  male  : 1995    MRN: [id number]  DATE: 2019  TIME: 1:25 PM    Assessment and Plan   Acute sinusitis, recurrence not specified, unspecified location [J01 90]  1  Acute sinusitis, recurrence not specified, unspecified location  amoxicillin (AMOXIL) 875 mg tablet    fluticasone (FLONASE) 50 mcg/act nasal spray       Patient Instructions     Patient Instructions   I have prescribed an antibiotic for the infection  Please take the antibiotic as prescribed and finish the entire prescription  I recommend that the patient takes an over the counter probiotic or eats yogurt with live cultures in it [place]) to keep good bacteria in the gut and help prevent diarrhea  Wash hands frequently to prevent the spread of infection  Ibuprofen and/or tylenol as needed for pain or fever  If not improving over the next 7-10 days, follow up with PCP  claritin and flonase as directed for symptoms  Chief Complaint     Chief Complaint   Patient presents with    Sinusitis     x 5 days    Nasal Congestion       History of Present Illness   58-year-old male here with complaint of sinus congestion sinus pressure for the last 4-5 days  States has been getting progressively worse  Has been using Flonase with minimal relief  No fever or chills  Has been coughing and has a slight sore throat  Review of Systems   Review of Systems   Constitutional: Negative for chills, fatigue and fever  HENT: Positive for congestion, postnasal drip, sinus pressure and sore throat  Negative for ear pain  Respiratory: Positive for cough  Negative for shortness of breath and wheezing  Neurological: Positive for headaches  All other systems reviewed and are negative        Current Medications     Current Outpatient Medications:     clomiPRAMINE (ANAFRANIL) 50 mg capsule, Take 2 capsules by mouth, Disp: , Rfl:     cloZAPine (CLOZARIL) 100 mg tablet, Take 2 tablets by mouth, Disp: , Rfl:     cloZAPine (CLOZARIL) 25 mg tablet, take 1 tablet by mouth once daily (AT 8 AM), Disp: , Rfl: 0    metoprolol succinate (TOPROL-XL) 100 mg 24 hr tablet, Take 1 tablet (100 mg total) by mouth 2 (two) times a day Take 50 mg in am and 100 in the evening, Disp: 60 tablet, Rfl: 5    omeprazole (PriLOSEC) 20 mg delayed release capsule, Take by mouth daily, Disp: , Rfl: 0    amoxicillin (AMOXIL) 875 mg tablet, Take 1 tablet (875 mg total) by mouth 2 (two) times a day for 10 days, Disp: 20 tablet, Rfl: 0    fenofibrate (TRICOR) 145 mg tablet, Take 1 tablet (145 mg total) by mouth daily (Patient not taking: Reported on 8/30/2019), Disp: 90 tablet, Rfl: 0    fluticasone (FLONASE) 50 mcg/act nasal spray, 2 sprays into each nostril daily, Disp: 1 Bottle, Rfl: 0    loratadine (CLARITIN) 10 mg tablet, Take 1 tablet (10 mg total) by mouth daily (Patient not taking: Reported on 8/30/2019), Disp: 30 tablet, Rfl: 0    Current Allergies     Allergies as of 09/04/2019 - Reviewed 09/04/2019   Allergen Reaction Noted    Prednisone  02/23/2015          The following portions of the patient's history were reviewed and updated as appropriate: allergies, current medications, past family history, past medical history, past social history, past surgical history and problem list    Past Medical History:   Diagnosis Date    Autism     [facility]     Mononucleosis     Schizophrenia ([facility]     Tachycardia 12/10/2018     Past Surgical History:   Procedure Laterality Date    DENTAL SURGERY      TONSILLECTOMY AND ADENOIDECTOMY      Last assessed 7/8/2014     Family History   Problem Relation Age of Onset    No Known Problems Father     Cancer Family     Diabetes Family     Hyperlipidemia Family     Stroke Family      Social History     Socioeconomic History    Marital status: Single     Spouse name: Not on file    Number of children: Not on file    Years of education: Not on file    Highest education level: Not on file   Occupational History    Not on file   Social Needs    Financial resource strain: Not on file    Food insecurity:     Worry: Not on file     Inability: Not on file    Transportation needs:     Medical: Not on file     Non-medical: Not on file   Tobacco Use    Smoking status: Never Smoker    Smokeless tobacco: Never Used   Substance and Sexual Activity    Alcohol use: No    Drug use: No    Sexual activity: Not on file   Lifestyle    Physical activity:     Days per week: Not on file     Minutes per session: Not on file    Stress: Not on file   Relationships    Social connections:     Talks on phone: Not on file     Gets together: Not on file     Attends Hoah[employer]ism service: Not on file     Active member of club or organization: Not on file     Attends meetings of clubs or organizations: Not on file     Relationship status: Not on file    Intimate partner violence:     Fear of current or ex partner: Not on file     Emotionally abused: Not on file     Physically abused: Not on file     Forced sexual activity: Not on file   Other Topics Concern    Not on file   Social History Narrative    Uses seatbelts     Medications have been verified  Objective   /80   Pulse (!) 108   Temp 98 7 °F (37 1 °C)   Resp 16   Ht 5' 3" (1 6 m)   Wt 103 kg (226 lb)   SpO2 95%   BMI 40 03 kg/m²      Physical Exam   Physical Exam   Constitutional: He appears well-developed and well-nourished  No distress  HENT:   Head: Normocephalic and atraumatic  Right Ear: Tympanic membrane normal    Left Ear: Tympanic membrane normal    Nose: Mucosal edema present  Mouth/Throat: Posterior oropharyngeal erythema present  Cardiovascular: Normal rate, regular rhythm and normal heart sounds  Pulmonary/Chest: Effort normal and breath sounds normal  No respiratory distress  Vitals reviewed

## 2019-09-04 NOTE — PATIENT INSTRUCTIONS
I have prescribed an antibiotic for the infection  Please take the antibiotic as prescribed and finish the entire prescription  I recommend that the patient takes an over the counter probiotic or eats yogurt with live cultures in it [place]) to keep good bacteria in the gut and help prevent diarrhea  Wash hands frequently to prevent the spread of infection  Ibuprofen and/or tylenol as needed for pain or fever  If not improving over the next 7-10 days, follow up with PCP  claritin and flonase as directed for symptoms

## 2019-09-11 PROCEDURE — 93227 XTRNL ECG REC<48 HR R&I: CPT | Performed by: INTERNAL MEDICINE

## 2019-09-24 ENCOUNTER — OFFICE VISIT (OUTPATIENT)
Dept: FAMILY MEDICINE CLINIC | Facility: CLINIC | Age: 24
End: 2019-09-24
Payer: COMMERCIAL

## 2019-09-24 VITALS
OXYGEN SATURATION: 98 % | WEIGHT: 230 LBS | DIASTOLIC BLOOD PRESSURE: 82 MMHG | SYSTOLIC BLOOD PRESSURE: 120 MMHG | TEMPERATURE: 98 F | RESPIRATION RATE: 17 BRPM | BODY MASS INDEX: 40.74 KG/M2 | HEART RATE: 119 BPM

## 2019-09-24 DIAGNOSIS — Z23 IMMUNIZATION DUE: ICD-10-CM

## 2019-09-24 DIAGNOSIS — F84.9 PDD (PERVASIVE DEVELOPMENTAL DISORDER): Primary | ICD-10-CM

## 2019-09-24 DIAGNOSIS — R00.0 TACHYCARDIA: Chronic | ICD-10-CM

## 2019-09-24 DIAGNOSIS — E66.01 MORBID OBESITY (HCC): ICD-10-CM

## 2019-09-24 DIAGNOSIS — G47.30 SLEEP APNEA, UNSPECIFIED TYPE: ICD-10-CM

## 2019-09-24 PROCEDURE — 99214 OFFICE O/P EST MOD 30 MIN: CPT | Performed by: FAMILY MEDICINE

## 2019-09-24 PROCEDURE — 90471 IMMUNIZATION ADMIN: CPT

## 2019-09-24 PROCEDURE — 90686 IIV4 VACC NO PRSV 0.5 ML IM: CPT

## 2019-09-24 NOTE — PROGRESS NOTES
Assessment/Plan:         Diagnoses and all orders for this visit:    PDD (pervasive developmental disorder)  Comments:  stable, cpm    Tachycardia  Comments:  pt asymptomatic, management per cardiology- appreciate consult    Morbid obesity ([address] )    Sleep apnea, unspecified type  -     Ambulatory referral to Sleep Medicine; Future    Immunization due  -     influenza vaccine, [id number], quadrivalent, 0 5 mL, preservative-free, for adult and pediatric patients 6 mos+ (AFLURIA, FLUARIX, FLULAVAL, FLUZONE)          Subjective:   Chief Complaint   Patient presents with    Follow-up        Patient ID: Ayala Chaudhary is a 22 y o  male  routine f/u, here with his mom   HR still remains high, management per cardiol, who has been adjusting meds, sees again in f/u in a week  Will be training for [employer]  Mother reports been cutting down on calories and walking, but still gaining weight  Also reports,"Little virus or sinus infection couple weeks ago" per mother- completely resolved      The following portions of the patient's history were reviewed and updated as appropriate: allergies, current medications, past family history, past medical history, past social history, past surgical history and problem list     Review of Systems   Constitutional: Negative for activity change, appetite change, chills, diaphoresis, fatigue and fever  HENT: Negative for mouth sores, nosebleeds, sore throat and trouble swallowing  Eyes: Negative  Respiratory: Negative  Cardiovascular: Negative for chest pain, palpitations and leg swelling  Gastrointestinal: Negative  Endocrine: Negative  Genitourinary: Negative for decreased urine volume and hematuria  Skin: Negative  Neurological: Negative  Psychiatric/Behavioral: Negative for agitation, behavioral problems and sleep disturbance  The patient is not nervous/anxious and is not hyperactive            Objective:      /82   Pulse (!) 119   Temp 98 °F (36 7 °C)   Resp 17   Wt 104 kg (230 lb)   SpO2 98%   BMI 40 74 kg/m²          Physical Exam   Constitutional: He is oriented to person, place, and time  He appears well-developed  He is cooperative  Non-toxic appearance  No distress  Unchanged appearance   HENT:   Head: Normocephalic and atraumatic  Mouth/Throat: Uvula is midline, oropharynx is clear and moist and mucous membranes are normal    Eyes: Pupils are equal, round, and reactive to light  Conjunctivae and lids are normal    Neck: Trachea normal  Neck supple  No JVD present  No thyroid mass and no thyromegaly present  Cardiovascular: Regular rhythm, normal heart sounds and normal pulses  Tachycardia present  No edema   Pulmonary/Chest: Effort normal and breath sounds normal    Abdominal: Soft  Bowel sounds are normal  He exhibits no distension, no abdominal bruit and no mass  There is no hepatosplenomegaly  There is no tenderness  Hernia confirmed negative in the ventral area  Lymphadenopathy:     He has no cervical adenopathy  Right: No supraclavicular adenopathy present  Left: No supraclavicular adenopathy present  Neurological: He is alert and oriented to person, place, and time  He has normal strength and normal reflexes  Gait normal    Skin: Skin is warm and dry  Capillary refill takes less than 2 seconds  He is not diaphoretic  No cyanosis  No pallor  Psychiatric: His behavior is normal  His mood appears not anxious  His affect is not angry, not labile and not inappropriate  He does not exhibit a depressed mood  Flat affect unchanged   Nursing note and vitals reviewed  BMI Counseling: Body mass index is 40 74 kg/m²  The BMI is above normal  Nutrition recommendations include reducing portion sizes, decreasing overall calorie intake, increasing intake of lean protein, reducing intake of saturated fat and trans fat and reducing intake of cholesterol   Exercise recommendations include moderate aerobic physical activity for 150 minutes/week

## 2019-09-24 NOTE — PATIENT INSTRUCTIONS
Calorie Counting Diet   WHAT YOU NEED TO KNOW:   What is a calorie counting diet? It is a meal plan based on counting calories each day to reach a healthy body weight  You will need to eat fewer calories if you are trying to lose weight  Weight loss may decrease your risk for certain health problems or improve your health if you have health problems  Some of these health problems include heart disease, high blood pressure, and diabetes  What foods should I avoid? Your dietitian will tell you if you need to avoid certain foods based on your body weight and health condition  You may need to avoid high-fat foods if you are at risk for or have heart disease  You may need to eat fewer foods from the breads and starches food group if you have diabetes  How many calories are in foods? The following is a list of foods and drinks with the approximate number of calories in each  Check the food label to find the exact number of calories  A dietitian can tell you how many calories you should have from each food group each day    · Carbohydrate:      ¨ ½ of a 3-inch bagel, 1 slice of bread, or ½ of a hamburger bun or hot dog bun (80)    ¨ 1 (8-inch) flour tortilla or ½ cup of cooked rice (100)    ¨ 1 (6-inch) corn tortilla (80)    ¨ 1 (6-inch) pancake or 1 cup of bran flakes cereal (110)    ¨ ½ cup of cooked cereal (80)    ¨ ½ cup of cooked pasta (85)    ¨ 1 ounce of pretzels (100)    ¨ 3 cups of air-popped popcorn without butter or oil (80)    · Dairy:      ¨ 1 cup of skim or 1% milk (90)    ¨ 1 cup of 2% milk (120)    ¨ 1 cup of whole milk (160)    ¨ 1 cup of 2% chocolate milk (220)    ¨ 1 ounce of low-fat cheese with 3 grams of fat per ounce (70)    ¨ 1 ounce of cheddar cheese (114)    ¨ ½ cup of 1% fat cottage cheese (80)    ¨ 1 cup of plain or sugar-free, fat-free yogurt (90)    · Protein foods:      ¨ 3 ounces of fish (not breaded or fried) (95)    ¨ 3 ounces of breaded, fried fish (195)    ¨ ¾ cup of tuna canned in water (105)    ¨ 3 ounces of chicken breast without skin (105)    ¨ 1 fried chicken breast with skin (350)    ¨ ¼ cup of fat free egg substitute (40)    ¨ 1 large egg (75)    ¨ 3 ounces of lean beef or pork (165)    ¨ 3 ounces of fried pork chop or ham (185)    ¨ ½ cup of cooked dried beans, such as kidney, bangura, lentils, or navy (115)    ¨ 3 ounces of bologna or lunch meat (225)    ¨ 2 links of breakfast sausage (140)    · Vegetables:      ¨ ½ cup of sliced mushrooms (10)    ¨ 1 cup of salad greens, such as lettuce, spinach, or colin (15)    ¨ ½ cup of steamed asparagus (20)    ¨ ½ cup of cooked summer squash, zucchini squash, or green or wax beans (25)    ¨ 1 cup of broccoli or cauliflower florets, or 1 medium tomato (25)    ¨ 1 large raw carrot or ½ cup of cooked carrots (40)    ¨ ? of a medium cucumber or 1 stalk of celery (5)    ¨ 1 small baked potato (160)    ¨ 1 cup of breaded, fried vegetables (230)    · Fruit:      ¨ 1 (6-inch) banana (55)     ¨ ½ of a 4-inch grapefruit (55)    ¨ 15 grapes (60)    ¨ 1 medium orange or apple (70)    ¨ 1 large peach (65)    ¨ 1 cup of fresh pineapple chunks (75)    ¨ 1 cup of melon cubes (50)    ¨ 1¼ cups of whole strawberries (45)    ¨ ½ cup of fruit canned in juice (55)    ¨ ½ cup of fruit canned in heavy syrup (110)    ¨ ?  cup of raisins (130)    ¨ ½ cup of unsweetened fruit juice (60)    ¨ ½ cup of grape, cranberry, or prune juice (90)    · Fat:      ¨ 10 peanuts or 2 teaspoons of peanut butter (55)    ¨ 2 tablespoons of avocado or 1 tablespoon of regular salad dressing (45)    ¨ 2 slices of marroquin (90)    ¨ 1 teaspoon of oil, such as safflower, canola, corn, or olive oil (45)    ¨ 2 teaspoons of low-fat margarine, or 1 tablespoon of low-fat mayonnaise (50)    ¨ 1 teaspoon of regular margarine (40)    ¨ 1 tablespoon of regular mayonnaise (135)    ¨ 1 tablespoon of cream cheese or 2 tablespoons of low-fat cream cheese (45)    ¨ 2 tablespoons of vegetable shortening (215)    · Dessert and sweets:      ¨ 8 animal crackers or 5 vanilla wafers (80)    ¨ 1 frozen fruit juice bar (80)    ¨ ½ cup of ice milk or low-fat frozen yogurt (90)    ¨ ½ cup of sherbet or sorbet (125)    ¨ ½ cup of sugar-free pudding or custard (60)    ¨ ½ cup of ice cream (140)    ¨ ½ cup of pudding or custard (175)    ¨ 1 (2-inch) square chocolate brownie (185)    · Combination foods:      ¨ Bean burrito made with an 8-inch tortilla, without cheese (275)    ¨ Chicken breast sandwich with lettuce and tomato (325)    ¨ 1 cup of chicken noodle soup (60)    ¨ 1 beef taco (175)    ¨ Regular hamburger with lettuce and tomato (310)    ¨ Regular cheeseburger with lettuce and tomato (410)     ¨ ¼ of a 12-inch cheese pizza (280)    ¨ Fried fish sandwich with lettuce and tomato (425)    ¨ Hot dog and bun (275)    ¨ 1½ cups of macaroni and cheese (310)    ¨ Taco salad with a fried tortilla shell (870)    · Low-calorie foods:      ¨ 1 tablespoon of ketchup or 1 tablespoon of fat free sour cream (15)    ¨ 1 teaspoon of mustard (5)    ¨ ¼ cup of salsa (20)    ¨ 1 large dill pickle (15)    ¨ 1 tablespoon of fat free salad dressing (10)    ¨ 2 teaspoons of low-sugar, light jam or jelly, or 1 tablespoon of sugar-free syrup (15)    ¨ 1 sugar-free popsicle (15)    ¨ 1 cup of club soda, seltzer water, or diet soda (0)  CARE AGREEMENT:   You have the right to help plan your care  Discuss treatment options with your caregivers to decide what care you want to receive  You always have the right to refuse treatment  The above information is an  only  It is not intended as medical advice for individual conditions or treatments  Talk to your doctor, nurse or pharmacist before following any medical regimen to see if it is safe and effective for you  © 2017 [employer] Information is for End User's use only and may not be sold, redistributed or otherwise used for commercial purposes   All illustrations and images included in CareNotes® are the copyrighted property of A D A M , Inc  or [employer] Malin

## 2019-10-03 ENCOUNTER — HOSPITAL ENCOUNTER (OUTPATIENT)
Dept: NON INVASIVE DIAGNOSTICS | Facility: HOSPITAL | Age: 24
Discharge: HOME | End: 2019-10-03
Payer: COMMERCIAL

## 2019-10-03 DIAGNOSIS — R00.2 PALPITATION: ICD-10-CM

## 2019-10-03 DIAGNOSIS — R00.0 TACHYCARDIA: Chronic | ICD-10-CM

## 2019-10-03 PROCEDURE — 93306 TTE W/DOPPLER COMPLETE: CPT

## 2019-10-04 PROCEDURE — 93306 TTE W/DOPPLER COMPLETE: CPT | Performed by: INTERNAL MEDICINE

## 2019-10-15 ENCOUNTER — OFFICE VISIT (OUTPATIENT)
Dept: CARDIOLOGY CLINIC | Facility: CLINIC | Age: 24
End: 2019-10-15
Payer: COMMERCIAL

## 2019-10-15 VITALS
WEIGHT: 234 LBS | BODY MASS INDEX: 41.46 KG/M2 | HEIGHT: 63 IN | SYSTOLIC BLOOD PRESSURE: 96 MMHG | DIASTOLIC BLOOD PRESSURE: 70 MMHG | HEART RATE: 100 BPM

## 2019-10-15 DIAGNOSIS — I42.0 DILATED CARDIOMYOPATHY (HCC): ICD-10-CM

## 2019-10-15 DIAGNOSIS — R00.0 TACHYCARDIA: Chronic | ICD-10-CM

## 2019-10-15 DIAGNOSIS — R00.2 PALPITATIONS: Primary | ICD-10-CM

## 2019-10-15 PROCEDURE — 99214 OFFICE O/P EST MOD 30 MIN: CPT | Performed by: INTERNAL MEDICINE

## 2019-10-15 RX ORDER — METOPROLOL SUCCINATE 100 MG/1
50 TABLET, EXTENDED RELEASE ORAL 2 TIMES DAILY
Qty: 90 TABLET | Refills: 5
Start: 2019-10-15 | End: 2020-03-18

## 2019-10-15 NOTE — PROGRESS NOTES
Patient ID: Sloan Garcia is a 22 y o  male  Plan:      Dilated cardiomyopathy ([address] )  Resolved with better heart rate control  Palpitations    Some breakthroughs in the late afternoon before his daily metoprolol  I am going to have him split up so the takes half in the morning and half in the evening  Tachycardia    Clearly improved on beta-blocker  Follow up Plan:    One year EKG and follow-up visit  HPI:     The patient is seen in follow-up today regarding cardiomyopathy  This was likely tachycardia mediated  Since his last visit he had an echocardiogram which revealed improved LV function  Now normal     He feels a little bit less anxious than in the past   He feels some breakthrough palpitations in the late afternoon prior to his daily dose of   Metoprolol  Most recent or relevant cardiac/vascular testing:      Echocardiogram 12/12/2018:  Mild left ventricular enlargement  Ejection fraction 45%  Echocardiogram 10/3/2019: Normal left ventricular size and function  Holter monitor 8/3/2019:  Heart rate varied between 60 and 138 beats per minute  Past Surgical History:   Procedure Laterality Date    DENTAL SURGERY      TONSILLECTOMY AND ADENOIDECTOMY      Last assessed 7/8/2014     CMP:   Lab Results   Component Value Date    K 3 8 11/07/2018     11/07/2018    CO2 27 11/07/2018    BUN 10 11/07/2018    CREATININE 0 72 11/07/2018    EGFR 132 11/07/2018       Lipid Profile: No results found for: CHOL, TRIG, HDL, LDL      Review of Systems   10  point ROS  was otherwise non pertinent or negative except as per HPI or as below  Gait: Normal         Objective:     BP 96/70   Pulse 100   Ht 5' 3" (1 6 m)   Wt 106 kg (234 lb)   BMI 41 45 kg/m²     PHYSICAL EXAM:    General:  Normal appearance in no distress  Eyes:  Anicteric  Oral mucosa:  Moist   Neck:  No JVD  Carotid upstrokes are brisk without bruits  No masses    Chest:  Clear to auscultation and percussion  Cardiac:  Normal PMI  Normal S1 and S2  No murmur gallop or rub  Abdomen:  Soft and nontender  No palpable organomegaly or aortic enlargement  Extremities:  No peripheral edema  Musculoskeletal:  Symmetric  Vascular:  Femoral pulses are brisk without bruits  Popliteal pulses are intact bilaterally  Pedal pulses are intact  Neuro:  Grossly symmetric  Psych:  Alert and oriented x3          Current Outpatient Medications:     clomiPRAMINE (ANAFRANIL) 50 mg capsule, Take 2 capsules by mouth, Disp: , Rfl:     cloZAPine (CLOZARIL) 100 mg tablet, Take 2 tablets by mouth, Disp: , Rfl:     cloZAPine (CLOZARIL) 25 mg tablet, take 1 tablet by mouth once daily (AT 8 AM), Disp: , Rfl: 0    fluticasone (FLONASE) 50 mcg/act nasal spray, 2 sprays into each nostril daily, Disp: 1 Bottle, Rfl: 0    metoprolol succinate (TOPROL-XL) 100 mg 24 hr tablet, Take 0 5 tablets (50 mg total) by mouth 2 (two) times a day, Disp: 90 tablet, Rfl: 5    omeprazole (PriLOSEC) 20 mg delayed release capsule, Take by mouth daily, Disp: , Rfl: 0    fenofibrate (TRICOR) 145 mg tablet, Take 1 tablet (145 mg total) by mouth daily (Patient not taking: Reported on 8/30/2019), Disp: 90 tablet, Rfl: 0    loratadine (CLARITIN) 10 mg tablet, Take 1 tablet (10 mg total) by mouth daily (Patient not taking: Reported on 8/30/2019), Disp: 30 tablet, Rfl: 0  Allergies   Allergen Reactions    Prednisone      DEPRESSION     Past Medical History:   Diagnosis Date    Autism     Dilated cardiomyopathy (HCC)     Mononucleosis     [facility] [address] [address]     Tachycardia 12/10/2018           Social History     Tobacco Use   Smoking Status Never Smoker   Smokeless Tobacco Never Used

## 2019-10-15 NOTE — ASSESSMENT & PLAN NOTE
Some breakthroughs in the late afternoon before his daily metoprolol  I am going to have him split up so the takes half in the morning and half in the evening

## 2019-11-01 ENCOUNTER — OFFICE VISIT (OUTPATIENT)
Dept: SLEEP CENTER | Facility: CLINIC | Age: 24
End: 2019-11-01
Payer: COMMERCIAL

## 2019-11-01 VITALS
BODY MASS INDEX: 41.89 KG/M2 | DIASTOLIC BLOOD PRESSURE: 70 MMHG | HEIGHT: 63 IN | SYSTOLIC BLOOD PRESSURE: 116 MMHG | WEIGHT: 236.4 LBS | HEART RATE: 114 BPM

## 2019-11-01 DIAGNOSIS — I42.0 DILATED CARDIOMYOPATHY (HCC): ICD-10-CM

## 2019-11-01 DIAGNOSIS — F20.9 SCHIZOPHRENIA, UNSPECIFIED TYPE (HCC): ICD-10-CM

## 2019-11-01 DIAGNOSIS — G47.10 HYPERSOMNIA: ICD-10-CM

## 2019-11-01 DIAGNOSIS — G47.30 SLEEP APNEA, UNSPECIFIED TYPE: ICD-10-CM

## 2019-11-01 DIAGNOSIS — R00.0 TACHYCARDIA: Chronic | ICD-10-CM

## 2019-11-01 DIAGNOSIS — G47.33 OSA (OBSTRUCTIVE SLEEP APNEA): Primary | ICD-10-CM

## 2019-11-01 DIAGNOSIS — R00.2 PALPITATIONS: ICD-10-CM

## 2019-11-01 DIAGNOSIS — E66.9 OBESITY, UNSPECIFIED CLASSIFICATION, UNSPECIFIED OBESITY TYPE, UNSPECIFIED WHETHER SERIOUS COMORBIDITY PRESENT: ICD-10-CM

## 2019-11-01 DIAGNOSIS — F84.0 AUTISTIC DISORDER, ACTIVE: Chronic | ICD-10-CM

## 2019-11-01 PROCEDURE — 99204 OFFICE O/P NEW MOD 45 MIN: CPT | Performed by: PSYCHIATRY & NEUROLOGY

## 2019-11-01 NOTE — LETTER
November 1, 2019     Sharon Alcantara PA-C  [address]  [place] [facility]  [phone]    Patient: Clare Apley   YOB: 1995   Date of Visit: 11/1/2019       Dear Dr Lashawn Vail: Thank you for referring Clare Apley to me for evaluation  Below are my notes for this consultation  If you have questions, please do not hesitate to call me  I look forward to following your patient along with you  Sincerely,        Hossein Ortiz MD        CC: No Recipients  Hossein Ortiz MD  11/1/2019  3:47 PM  Signed  Sleep Medicine Consultation Note    HPI:  Mr Clare Apley is a 22 y o  male seen at the request of Lashawn Vail, Gamaliel Reveles and [employer], DO for advice regarding suspected sleep disordered breathing  The patient presented with his mother  He stated that at night he snores and it takes him a long time fall asleep  He is usually asleep by midnight and wakes up depending on the day, 9am-12pm  He has broken sleep and wakes up frequently throughout the night, 5 times usually  This is usually to go to the bathroom  He is usually able to get back to sleep  Snoring has been present for many years, mom stated that he snored as a child and it worsened  This is largely due to the weight gain  He has gained 10-15 lbs over the last few months  He sleeps on his back mainly  Nothing makes his sleep better  Mom stated its not just a regular snore, its more like a moaning  This is all night  She denied pauses in his breathing, but is unsure  He does occasionally gasp for air  Frequent nocturnal awakenings have been going on for years  Nothing has made this better  Worsened over the years  Sometimes he will wake up and get a snack in the kitchen this is not every night  No cooking or raw food       Please see below for continuation of the HPI:      Sleep Disordered Breathing:  -Mouth Breathing at night: yes  -Dry Mouth in morning: sometimes   -Nocturnal Gasping: yes  -Nasal Obstruction: yes  -Weight: see HPI    Sleep Pattern:  -Location: bedroom  -Bed/Recliner/Wedge: bed  -Bed Partner: no  -HOB: flat  -# of pillows under head: 1  -Position: side sometimes, mainly back  See HPI for sleep times    Daytime Symptoms:  -Upon Awakening: drowsy  -Daytime fatigue/sleepiness: tiredness sometimes  Worse after a meal   -Naps: naps in the early evening 3-7pm but not daily  -Involuntary Dozing: rarely while watching TV  -Cognitive Symptoms: sometimes trouble focusing and concentrating  -Driving: Does not drive    Questionnaires:   Sitting and reading: Would never doze  Watching TV: Slight chance of dozing  Sitting, inactive in a public place (e g  a theatre or a meeting): Would never doze  As a passenger in a car for an hour without a break: Would never doze  Lying down to rest in the afternoon when circumstances permit: Slight chance of dozing  Sitting and talking to someone: Would never doze  Sitting quietly after a lunch without alcohol: Slight chance of dozing  In a car, while stopped for a few minutes in traffic: Would never doze  Total score: 3      Sleep Review of Symptoms:  -Parasomnias:  --Sleep Walking: denied  --Dream Enactment: remembers moving his arms twice but cannot recall the dream  --Bruxism: denied  -Motor:  --RLS: denied  --PLMS: denied  -Narcolepsy:  --Hallucinations: denied  --Paralysis: denied  --Cataplexy: denied    Childhood Sleep History: snoring    Prior Sleep Studies/Evaluations:  2014 was seeing Dr Anselmo Hill with [facility]  Had "mild DARREN about 10 years ago " Had tonsils and adenoids removed in 2014  No CPAP      Family History:  Family history of sleep disorders: denied    Patient Active Problem List   Diagnosis    Abnormal EKG    Autistic disorder, active    Depression    Eczema    Essential hypertriglyceridemia    Hyperlipidemia    Left-sided Bell's palsy    [facility] [address] [facility]    PDD (pervasive developmental disorder)    Plantar warts    Chronic schizophreniform disorder ([place] [address] )    Sleep apnea    Vitamin D deficiency    Lactic acidosis    Tachycardia    Periumbilical abdominal pain    Hypophosphatemia    Hypomagnesemia    Hypokalemia    Dilated cardiomyopathy ([place] [address] )    Encounter for examination to participate in special olympics    Palpitations     Past Medical History:   Diagnosis Date    Autism     Dilated cardiomyopathy ([place] [address] )     Mononucleosis     Schizophrenia ([place] [address] )     Tachycardia 12/10/2018     --> Seizure hx: denies  --> Head injury with LOC: denies  --> Supplemental Oxygen Use: denies    Labs     Results for Andie Kendrick (MRN [id number]) as of 11/1/2019 15:07   Ref  Range 11/7/2018 10:48   Sodium Latest Ref Range: 134 - 143 mmol/L 140   Potassium Latest Ref Range: 3 5 - 5 5 mmol/L 3 8   Chloride Latest Ref Range: 98 - 107 mmol/L 101   CO2 Latest Ref Range: 21 - 31 mmol/L 27   Anion Gap Latest Ref Range: 4 - 13 mmol/L 12   BUN Latest Ref Range: 7 - 25 mg/dL 10   Creatinine Latest Ref Range: 0 70 - 1 30 mg/dL 0 72   GLUCOSE FASTING Latest Ref Range: 65 - 99 mg/dL 83   Calcium Latest Ref Range: 8 6 - 10 5 mg/dL 9 2   AST Latest Ref Range: 13 - 39 U/L 27   ALT Latest Ref Range: 7 - 52 U/L 45   Alkaline Phosphatase Latest Ref Range: 40 - 150 U/L 55   Total Protein Latest Ref Range: 6 4 - 8 9 g/dL 6 5   Albumin Latest Ref Range: 3 5 - 5 7 g/dL 4 0   TOTAL BILIRUBIN Latest Ref Range: 0 20 - 1 00 mg/dL 0 50   eGFR Latest Units: ml/min/1 73sq m 132   Phosphorus Latest Ref Range: 3 0 - 5 5 mg/dL 3 8   Magnesium Latest Ref Range: 1 9 - 2 7 mg/dL 2 1   8/2019: EKG showed sinus tachy   8/2019: A Holter Monitor was performed for  48 hours      IMPRESSION:  1  The basic mechanism was sinus with rates ranging from  60 beats per minute to 138 beats per minute  2  Supraventricular ectopic beats were rare  3  Ventricular ectopic beats were not seen  4  No pauses were present  5  No symptoms were noted during the monitoring phase    Transthoracic Echocardiogram  2D, M-mode, Doppler, and Color Doppler     Study date:  03-Oct-2019     Patient: Rolando Batista  MR number: [id number]  Account number: [de-identified]  : 1995  Age: 22 years  Gender: Male  Status: Outpatient  Location: [facility]   Height: 63 in  Weight: 225 5 lb  BP: 120/ 82 mmHg     Indications: Palpitations  Tachycardia      Diagnoses: R00 0 - Tachycardia, unspecified, R00 2 - Palpitations     Sonographer:  Carolynn Zapata RDCS  Referring Physician:  Uday Luz PA-C  Group:  [facility]  Interpreting Physician:  Reshma Means MD     SUMMARY     LEFT VENTRICLE:  Systolic function was normal  Ejection fraction was estimated to be 60 %    There were no regional wall motion abnormalities      RIGHT VENTRICLE:  The size was at the upper limits of normal   Systolic function was mildly reduced      TRICUSPID VALVE:  Pulmonary artery systolic pressure was within the normal range      Past Surgical History:   Procedure Laterality Date    DENTAL SURGERY      TONSILLECTOMY AND ADENOIDECTOMY      Last assessed 2014       Current Outpatient Medications   Medication Sig Dispense Refill    clomiPRAMINE (ANAFRANIL) 50 mg capsule Take 2 capsules by mouth      cloZAPine (CLOZARIL) 100 mg tablet Take 2 tablets by mouth      cloZAPine (CLOZARIL) 25 mg tablet take 1 tablet by mouth once daily (AT 8 AM)  0    fluticasone (FLONASE) 50 mcg/act nasal spray 2 sprays into each nostril daily 1 Bottle 0    loratadine (CLARITIN) 10 mg tablet Take 1 tablet (10 mg total) by mouth daily 30 tablet 0    metoprolol succinate (TOPROL-XL) 100 mg 24 hr tablet Take 0 5 tablets (50 mg total) by mouth 2 (two) times a day 90 tablet 5    omeprazole (PriLOSEC) 20 mg delayed release capsule Take by mouth daily  0    fenofibrate (TRICOR) 145 mg tablet Take 1 tablet (145 mg total) by mouth daily (Patient not taking: Reported on 2019) 90 tablet 0     No current facility-administered medications for this visit  Social History:  -Employment: disabled  -Smoking: no  -Caffeine: no  -Alcohol: no  -THC: no  -OTC/Supplements/herbals: no  -Illicits:  denies  -Family: lives with parents    ROS:  Genitourinary need to urinate more than twice a night   Cardiology palpitations/fluttering feeling in the chest   Gastrointestinal frequent heartburn/acid reflux   Neurology frequent headaches   Constitutional fatigue and excessive sweating at night   Integumentary none   Psychiatry anxiety, depression, aggressiveness or irritability and mood change   Musculoskeletal none   Pulmonary shortness of breath with activity, snoring and difficulty breathing when lying flat    ENT throat clearing   Endocrine excessive thirst and frequent urination   Hematological none       MSE:  -Alert and appropriate: alert, calm, cooperative  -Oriented to person, place and time:  name, age, location, day/date/mon/yr  -Behavior: good, sustained eye contact  -Speech: Unremarkable rate/rhythm/volume  -Mood: "alright"  -Affect: blunted  -Thought Processes: linear, logical, goal directed  PE:  Body mass index is 41 88 kg/m²    Vitals:    11/01/19 1501   BP: 116/70   BP Location: Left arm   Patient Position: Sitting   Cuff Size: Standard   Pulse: (!) 114   Weight: 107 kg (236 lb 6 4 oz)   Height: 5' 3" (1 6 m)       -General:  In NAD    -Eyes: Conjunctival injection: none     -EOM:  PERRLA, EOMI   -Eyelid hooding: no    -ENT: MP: 4/4   -Facial deformity:  retrognathia   -Hard palate: high arch   -Soft palate:  Crowding with low set palate and high tongue   -Gums and teeth: normal dentition   -Tongue: no Scalloping   -Nares:  Patent    -Neck/Lymphatics: Lymphadenopathy:  none appreciated   -Masses:  none appreciated   -Circumference: Neck Circumference: 16 5 "    -Cardiac: Auscultation:  RRR   - LE edema over shins: none appreciated    -Pulm: -Respirations: unlaboured         -Auscultation:  CTA bilaterally, posterior fields    -Neuro: No resting tremor     -Musculoskeletal: Gait and stance: normal turning and ambulation; unremarkable  Assessment:  Mr Javid Flannery is a 22 y o  male with DARREN that is not currently being treated, who is seen to evaluate for obstructive sleep apnea  Given the patient's symptoms of observed apneas, snoring, possibly moaning, nocturnal gasping, hypersomnia, near daily napping, non-restorative sleep, and nocturia in the context of a narrow airway, obesity, and mouth breathing, the need to reassess for DARREN is indicated and treated if needed  He has  Schizophrenia, cardiomyopathy, and sinus tachycardia  He takes sedating medications during the day including metoprolol and Clozaril  These are contributing factors to his hypersomnia and EDS  He has a lack of a structured day and does not need to wake up at a certain time  He is in bed for a large portion of the night and day  The pathophysiology of, the reasons to treat and treatment options for obstructive sleep apnea were all reviewed with the patient and his mom today  Discussed the testing options and reviewed the benefits and downsides of both, patient opted for the in lab testing  He is amenable to treatment with PAP therapy  Discussed keeping nasal passages clear, abstaining from alcohol, and other sedating drugs at night- which will worsen symptoms of DARREN  Encouraged him to work on weight loss, optimize medications, structure the day, no napping late in the evening as it will interfere with sleep at night, and set wake time  --History provided by: patient and mom  --Records reviewed: in chart      Recommendations:  1) In lab diagnostic Polysomnography   2) Follow-up after initiation of treatment if needed  3) structuring his day, set wake him and to bed only when sleepy, no naps past 3pm and keep to 30 mins, exercise  4) Call with any questions or concerns       All questions answered for the patient and mom, who indicated understanding and agreed with the plan       Ying Wilburn MD  Psychiatry/ Sleep medicine

## 2019-11-01 NOTE — PROGRESS NOTES
Sleep Medicine Consultation Note    HPI:  Mr Dominic Avila is a 22 y o  male seen at the request of Stefano Flannery [place] and Dina Morales DO for advice regarding suspected sleep disordered breathing  The patient presented with his mother  He stated that at night he snores and it takes him a long time fall asleep  He is usually asleep by midnight and wakes up depending on the day, 9am-12pm  He has broken sleep and wakes up frequently throughout the night, 5 times usually  This is usually to go to the bathroom  He is usually able to get back to sleep  Snoring has been present for many years, mom stated that he snored as a child and it worsened  This is largely due to the weight gain  He has gained 10-15 lbs over the last few months  He sleeps on his back mainly  Nothing makes his sleep better  Mom stated its not just a regular snore, its more like a moaning  This is all night  She denied pauses in his breathing, but is unsure  He does occasionally gasp for air  Frequent nocturnal awakenings have been going on for years  Nothing has made this better  Worsened over the years  Sometimes he will wake up and get a snack in the kitchen this is not every night  No cooking or raw food  Please see below for continuation of the HPI:      Sleep Disordered Breathing:  -Mouth Breathing at night: yes  -Dry Mouth in morning: sometimes   -Nocturnal Gasping: yes  -Nasal Obstruction: yes  -Weight: see HPI    Sleep Pattern:  -Location: bedroom  -Bed/Recliner/Wedge: bed  -Bed Partner: no  -HOB: flat  -# of pillows under head: 1  -Position: side sometimes, mainly back  See HPI for sleep times    Daytime Symptoms:  -Upon Awakening: drowsy  -Daytime fatigue/sleepiness: tiredness sometimes   Worse after a meal   -Naps: naps in the early evening 3-7pm but not daily  -Involuntary Dozing: rarely while watching TV  -Cognitive Symptoms: sometimes trouble focusing and concentrating  -Driving: Does not drive    Questionnaires: Sitting and reading: Would never doze  Watching TV: Slight chance of dozing  Sitting, inactive in a public place (e g  a theatre or a meeting): Would never doze  As a passenger in a car for an hour without a break: Would never doze  Lying down to rest in the afternoon when circumstances permit: Slight chance of dozing  Sitting and talking to someone: Would never doze  Sitting quietly after a lunch without alcohol: Slight chance of dozing  In a car, while stopped for a few minutes in traffic: Would never doze  Total score: 3      Sleep Review of Symptoms:  -Parasomnias:  --Sleep Walking: denied  --Dream Enactment: remembers moving his arms twice but cannot recall the dream  --Bruxism: denied  -Motor:  --RLS: denied  --PLMS: denied  -Narcolepsy:  --Hallucinations: denied  --Paralysis: denied  --Cataplexy: denied    Childhood Sleep History: snoring    Prior Sleep Studies/Evaluations:  2014 was seeing Dr Jae Neal with [facility]  Had "mild DARREN about 10 years ago " Had tonsils and adenoids removed in 2014  No CPAP      Family History:  Family history of sleep disorders: denied    Patient Active Problem List   Diagnosis    Abnormal EKG    Autistic disorder, active    Depression    Eczema    Essential hypertriglyceridemia    Hyperlipidemia    Left-sided Bell's palsy    Morbid obesity ([address] )    PDD (pervasive developmental disorder)    Plantar warts    Chronic schizophreniform disorder (HCC)    Sleep apnea    Vitamin D deficiency    Lactic acidosis    Tachycardia    Periumbilical abdominal pain    Hypophosphatemia    Hypomagnesemia    Hypokalemia    Dilated cardiomyopathy ([address] )    Encounter for examination to participate in special olympics    Palpitations     Past Medical History:   Diagnosis Date    Autism     [facility] [facility]     Mononucleosis     Schizophrenia [facility] [address] [facility]     Tachycardia 12/10/2018     --> Seizure hx: denies  --> Head injury with LOC: denies  --> Supplemental Oxygen Use: denies    Labs     Results for Maggie Silveira (MRN [id number]) as of 2019 15:07   Ref  Range 2018 10:48   Sodium Latest Ref Range: 134 - 143 mmol/L 140   Potassium Latest Ref Range: 3 5 - 5 5 mmol/L 3 8   Chloride Latest Ref Range: 98 - 107 mmol/L 101   CO2 Latest Ref Range: 21 - 31 mmol/L 27   Anion Gap Latest Ref Range: 4 - 13 mmol/L 12   BUN Latest Ref Range: 7 - 25 mg/dL 10   Creatinine Latest Ref Range: 0 70 - 1 30 mg/dL 0 72   GLUCOSE FASTING Latest Ref Range: 65 - 99 mg/dL 83   Calcium Latest Ref Range: 8 6 - 10 5 mg/dL 9 2   AST Latest Ref Range: 13 - 39 U/L 27   ALT Latest Ref Range: 7 - 52 U/L 45   Alkaline Phosphatase Latest Ref Range: 40 - 150 U/L 55   Total Protein Latest Ref Range: 6 4 - 8 9 g/dL 6 5   Albumin Latest Ref Range: 3 5 - 5 7 g/dL 4 0   TOTAL BILIRUBIN Latest Ref Range: 0 20 - 1 00 mg/dL 0 50   eGFR Latest Units: ml/min/1 73sq m 132   Phosphorus Latest Ref Range: 3 0 - 5 5 mg/dL 3 8   Magnesium Latest Ref Range: 1 9 - 2 7 mg/dL 2 1   2019: EKG showed sinus tachy   2019: A Holter Monitor was performed for  48 hours      IMPRESSION:  1  The basic mechanism was sinus with rates ranging from  60 beats per minute to 138 beats per minute  2  Supraventricular ectopic beats were rare  3  Ventricular ectopic beats were not seen  4  No pauses were present  5  No symptoms were noted during the monitoring phase  Transthoracic Echocardiogram  2D, M-mode, Doppler, and Color Doppler     Study date:  03-Oct-2019     Patient: Nathaniel Botello  MR number: [id number]  Account number: [de-identified]  : 1995  Age: 22 years  Gender: Male  Status: Outpatient  [facility]   Height: 63 in  Weight: 225 5 lb  BP: 120/ 82 mmHg     Indications: Palpitations   Tachycardia      Diagnoses: R00 0 - Tachycardia, unspecified, R00 2 - Palpitations     Sonographer:  Malaika Patel RDCS  Referring Physician:  Neo Sandra PA-C  Group:  [facility]  Interpreting Physician:  Ángel Burgess MD     SUMMARY     LEFT VENTRICLE:  Systolic function was normal  Ejection fraction was estimated to be 60 %  There were no regional wall motion abnormalities      RIGHT VENTRICLE:  The size was at the upper limits of normal   Systolic function was mildly reduced      TRICUSPID VALVE:  Pulmonary artery systolic pressure was within the normal range      Past Surgical History:   Procedure Laterality Date    DENTAL SURGERY      TONSILLECTOMY AND ADENOIDECTOMY      Last assessed 7/8/2014       Current Outpatient Medications   Medication Sig Dispense Refill    clomiPRAMINE (ANAFRANIL) 50 mg capsule Take 2 capsules by mouth      cloZAPine (CLOZARIL) 100 mg tablet Take 2 tablets by mouth      cloZAPine (CLOZARIL) 25 mg tablet take 1 tablet by mouth once daily (AT 8 AM)  0    fluticasone (FLONASE) 50 mcg/act nasal spray 2 sprays into each nostril daily 1 Bottle 0    loratadine (CLARITIN) 10 mg tablet Take 1 tablet (10 mg total) by mouth daily 30 tablet 0    metoprolol succinate (TOPROL-XL) 100 mg 24 hr tablet Take 0 5 tablets (50 mg total) by mouth 2 (two) times a day 90 tablet 5    omeprazole (PriLOSEC) 20 mg delayed release capsule Take by mouth daily  0    fenofibrate (TRICOR) 145 mg tablet Take 1 tablet (145 mg total) by mouth daily (Patient not taking: Reported on 11/1/2019) 90 tablet 0     No current facility-administered medications for this visit          Social History:  -Employment: disabled  -Smoking: no  -Caffeine: no  -Alcohol: no  -THC: no  -OTC/Supplements/herbals: no  -Illicits:  denies  -Family: lives with parents    ROS:  Genitourinary need to urinate more than twice a night   Cardiology palpitations/fluttering feeling in the chest   Gastrointestinal frequent heartburn/acid reflux   Neurology frequent headaches   Constitutional fatigue and excessive sweating at night   Integumentary none   Psychiatry anxiety, depression, aggressiveness or irritability and mood change   Musculoskeletal none   Pulmonary shortness of breath with activity, snoring and difficulty breathing when lying flat    ENT throat clearing   Endocrine excessive thirst and frequent urination   Hematological none       MSE:  -Alert and appropriate: alert, calm, cooperative  -Oriented to person, place and time:  name, age, location, day/date/mon/yr  -Behavior: good, sustained eye contact  -Speech: Unremarkable rate/rhythm/volume  -Mood: "alright"  -Affect: blunted  -Thought Processes: linear, logical, goal directed  PE:  Body mass index is 41 88 kg/m²  Vitals:    11/01/19 1501   BP: 116/70   BP Location: Left arm   Patient Position: Sitting   Cuff Size: Standard   Pulse: (!) 114   Weight: 107 kg (236 lb 6 4 oz)   Height: 5' 3" (1 6 m)       -General:  In NAD    -Eyes: Conjunctival injection: none     -EOM:  PERRLA, EOMI   -Eyelid hooding: no    -ENT: MP: 4/4   -Facial deformity:  retrognathia   -Hard palate: high arch   -Soft palate:  Crowding with low set palate and high tongue   -Gums and teeth: normal dentition   -Tongue: no Scalloping   -Nares:  Patent    -Neck/Lymphatics: Lymphadenopathy:  none appreciated   -Masses:  none appreciated   -Circumference: Neck Circumference: 16 5 "    -Cardiac: Auscultation:  RRR   - LE edema over shins: none appreciated    -Pulm: -Respirations: unlaboured         -Auscultation:  CTA bilaterally, posterior fields    -Neuro: No resting tremor     -Musculoskeletal: Gait and stance: normal turning and ambulation; unremarkable  Assessment:  Mr Magdy Brooks is a 22 y o  male with DARREN that is not currently being treated, who is seen to evaluate for obstructive sleep apnea    Given the patient's symptoms of observed apneas, snoring, possibly moaning, nocturnal gasping, hypersomnia, near daily napping, non-restorative sleep, and nocturia in the context of a narrow airway, obesity, and mouth breathing, the need to reassess for DARREN is indicated and treated if needed  He has  Schizophrenia, cardiomyopathy, and sinus tachycardia  He takes sedating medications during the day including metoprolol and Clozaril  These are contributing factors to his hypersomnia and EDS  He has a lack of a structured day and does not need to wake up at a certain time  He is in bed for a large portion of the night and day  The pathophysiology of, the reasons to treat and treatment options for obstructive sleep apnea were all reviewed with the patient and his mom today  Discussed the testing options and reviewed the benefits and downsides of both, patient opted for the in lab testing  He is amenable to treatment with PAP therapy  Discussed keeping nasal passages clear, abstaining from alcohol, and other sedating drugs at night- which will worsen symptoms of DARREN  Encouraged him to work on weight loss, optimize medications, structure the day, no napping late in the evening as it will interfere with sleep at night, and set wake time  --History provided by: patient and mom  --Records reviewed: in chart      Recommendations:  1) In lab diagnostic Polysomnography   2) Follow-up after initiation of treatment if needed  3) structuring his day, set wake him and to bed only when sleepy, no naps past 3pm and keep to 30 mins, exercise  4) Call with any questions or concerns  All questions answered for the patient and mom, who indicated understanding and agreed with the plan       Kt Lowery MD  Psychiatry/ Sleep medicine

## 2019-11-01 NOTE — PATIENT INSTRUCTIONS
Recommendations:  1) In lab diagnostic Polysomnography   2) Follow-up after initiation of treatment if needed  3) structuring his day, set wake him and to bed only when sleepy, no naps past 3pm and keep to 30 mins, exercise  4) Call with any questions or concerns

## 2020-01-24 ENCOUNTER — TRANSCRIBE ORDERS (OUTPATIENT)
Dept: ADMINISTRATIVE | Facility: HOSPITAL | Age: 25
End: 2020-01-24

## 2020-01-24 DIAGNOSIS — K21.9 GASTROESOPHAGEAL REFLUX DISEASE WITHOUT ESOPHAGITIS: ICD-10-CM

## 2020-01-24 DIAGNOSIS — R94.5 ABNORMAL RESULTS OF LIVER FUNCTION STUDIES: ICD-10-CM

## 2020-01-24 DIAGNOSIS — R13.10 DYSPHAGIA, UNSPECIFIED TYPE: Primary | ICD-10-CM

## 2020-02-11 ENCOUNTER — APPOINTMENT (OUTPATIENT)
Dept: LAB | Facility: HOSPITAL | Age: 25
End: 2020-02-11
Payer: COMMERCIAL

## 2020-02-11 ENCOUNTER — TRANSCRIBE ORDERS (OUTPATIENT)
Dept: ADMINISTRATIVE | Facility: HOSPITAL | Age: 25
End: 2020-02-11

## 2020-02-11 ENCOUNTER — HOSPITAL ENCOUNTER (OUTPATIENT)
Dept: ULTRASOUND IMAGING | Facility: HOSPITAL | Age: 25
Discharge: HOME | End: 2020-02-11
Payer: COMMERCIAL

## 2020-02-11 DIAGNOSIS — K21.9 GASTROESOPHAGEAL REFLUX DISEASE WITHOUT ESOPHAGITIS: ICD-10-CM

## 2020-02-11 DIAGNOSIS — R13.10 DYSPHAGIA, UNSPECIFIED TYPE: ICD-10-CM

## 2020-02-11 DIAGNOSIS — R94.5 ABNORMAL RESULTS OF LIVER FUNCTION STUDIES: ICD-10-CM

## 2020-02-11 DIAGNOSIS — Z51.81 ENCOUNTER FOR THERAPEUTIC DRUG MONITORING: ICD-10-CM

## 2020-02-11 DIAGNOSIS — Z51.81 ENCOUNTER FOR THERAPEUTIC DRUG MONITORING: Primary | ICD-10-CM

## 2020-02-11 LAB
25(OH)D3 SERPL-MCNC: 17.4 NG/ML (ref 30–100)
BASOPHILS # BLD AUTO: 0 THOUSANDS/ΜL (ref 0–0.1)
BASOPHILS NFR BLD AUTO: 0 % (ref 0–2)
EOSINOPHIL # BLD AUTO: 0 THOUSAND/ΜL (ref 0–0.61)
EOSINOPHIL NFR BLD AUTO: 0 % (ref 0–5)
ERYTHROCYTE [DISTWIDTH] IN BLOOD BY AUTOMATED COUNT: 13 % (ref 11.5–14.5)
FERRITIN SERPL-MCNC: 77 NG/ML (ref 8–388)
HCT VFR BLD AUTO: 46.4 % (ref 42–47)
HGB BLD-MCNC: 15.8 G/DL (ref 14–18)
IRON SATN MFR SERPL: 21 %
IRON SERPL-MCNC: 84 UG/DL (ref 65–175)
LYMPHOCYTES # BLD AUTO: 2.6 THOUSANDS/ΜL (ref 0.6–4.47)
LYMPHOCYTES NFR BLD AUTO: 36 % (ref 21–51)
MCH RBC QN AUTO: 31 PG (ref 26–34)
MCHC RBC AUTO-ENTMCNC: 34 G/DL (ref 31–37)
MCV RBC AUTO: 91 FL (ref 81–99)
MONOCYTES # BLD AUTO: 0.5 THOUSAND/ΜL (ref 0.17–1.22)
MONOCYTES NFR BLD AUTO: 7 % (ref 2–12)
NEUTROPHILS # BLD AUTO: 4.1 THOUSANDS/ΜL (ref 1.4–6.5)
NEUTS SEG NFR BLD AUTO: 57 % (ref 42–75)
PLATELET # BLD AUTO: 251 THOUSANDS/UL (ref 149–390)
PMV BLD AUTO: 8.3 FL (ref 8.6–11.7)
RBC # BLD AUTO: 5.09 MILLION/UL (ref 4.3–5.9)
TIBC SERPL-MCNC: 394 UG/DL (ref 250–450)
WBC # BLD AUTO: 7.1 THOUSAND/UL (ref 4.8–10.8)

## 2020-02-11 PROCEDURE — 76700 US EXAM ABDOM COMPLETE: CPT

## 2020-02-11 PROCEDURE — 86256 FLUORESCENT ANTIBODY TITER: CPT

## 2020-02-11 PROCEDURE — 86038 ANTINUCLEAR ANTIBODIES: CPT

## 2020-02-11 PROCEDURE — 82306 VITAMIN D 25 HYDROXY: CPT

## 2020-02-11 PROCEDURE — 82728 ASSAY OF FERRITIN: CPT

## 2020-02-11 PROCEDURE — 36415 COLL VENOUS BLD VENIPUNCTURE: CPT

## 2020-02-11 PROCEDURE — 82103 ALPHA-1-ANTITRYPSIN TOTAL: CPT

## 2020-02-11 PROCEDURE — 85025 COMPLETE CBC W/AUTO DIFF WBC: CPT

## 2020-02-11 PROCEDURE — 83540 ASSAY OF IRON: CPT

## 2020-02-11 PROCEDURE — 83550 IRON BINDING TEST: CPT

## 2020-02-11 PROCEDURE — 86235 NUCLEAR ANTIGEN ANTIBODY: CPT

## 2020-02-11 PROCEDURE — 82390 ASSAY OF CERULOPLASMIN: CPT

## 2020-02-12 ENCOUNTER — TELEPHONE (OUTPATIENT)
Dept: SLEEP CENTER | Facility: CLINIC | Age: 25
End: 2020-02-12

## 2020-02-12 LAB
A1AT SERPL-MCNC: 102 MG/DL (ref 95–164)
ACTIN IGG SERPL-ACNC: 5 UNITS (ref 0–19)
CERULOPLASMIN SERPL-MCNC: 18.1 MG/DL (ref 16–31)
MITOCHONDRIA M2 IGG SER-ACNC: <20 UNITS (ref 0–20)
RYE IGE QN: NEGATIVE

## 2020-02-16 DIAGNOSIS — G47.33 OSA (OBSTRUCTIVE SLEEP APNEA): Primary | ICD-10-CM

## 2020-02-28 ENCOUNTER — HOSPITAL ENCOUNTER (OUTPATIENT)
Dept: SLEEP CENTER | Facility: HOSPITAL | Age: 25
Discharge: HOME | End: 2020-02-28
Attending: PSYCHIATRY & NEUROLOGY
Payer: COMMERCIAL

## 2020-02-28 DIAGNOSIS — G47.33 OSA (OBSTRUCTIVE SLEEP APNEA): ICD-10-CM

## 2020-02-28 PROCEDURE — G0399 HOME SLEEP TEST/TYPE 3 PORTA: HCPCS | Performed by: PSYCHIATRY & NEUROLOGY

## 2020-02-28 PROCEDURE — G0399 HOME SLEEP TEST/TYPE 3 PORTA: HCPCS

## 2020-03-02 NOTE — PROGRESS NOTES
Home Sleep Study Documentation    Pre-Sleep Home Study:    Set-up and instructions performed by: TAMMI Marx RRT    Technician performed demonstration for Patient: yes    Return demonstration performed by Patient: yes    Written instructions provided to Patient: yes    Patient signed consent form: yes        Post-Sleep Home Study:    Additional comments by Patient: n/a    Home Sleep Study Failed:no:    Failure reason: N/A    Reported or Detected: N/A    Scored by: Myra CADENA

## 2020-03-03 ENCOUNTER — TELEPHONE (OUTPATIENT)
Dept: SLEEP CENTER | Facility: CLINIC | Age: 25
End: 2020-03-03

## 2020-03-03 DIAGNOSIS — R06.83 SNORING: Primary | ICD-10-CM

## 2020-03-03 NOTE — TELEPHONE ENCOUNTER
Left message for the patient to call back to discuss home sleep study       In lab diagnostic study ordered

## 2020-03-03 NOTE — TELEPHONE ENCOUNTER
Spoke with Mom advised home sleep study was non diagnostics     Scheduled patient for in lab diagnostic study

## 2020-03-03 NOTE — TELEPHONE ENCOUNTER
----- Message from Stephanie Tam MD sent at 3/3/2020 12:33 PM EST -----  HST non-diagnostic  PSG ordered

## 2020-03-18 DIAGNOSIS — R00.0 TACHYCARDIA: Chronic | ICD-10-CM

## 2020-03-18 DIAGNOSIS — I42.0 DILATED CARDIOMYOPATHY (HCC): ICD-10-CM

## 2020-03-18 RX ORDER — METOPROLOL SUCCINATE 100 MG/1
TABLET, EXTENDED RELEASE ORAL
Qty: 60 TABLET | Refills: 3 | Status: SHIPPED | OUTPATIENT
Start: 2020-03-18 | End: 2020-07-10

## 2020-06-26 ENCOUNTER — OFFICE VISIT (OUTPATIENT)
Dept: FAMILY MEDICINE CLINIC | Facility: CLINIC | Age: 25
End: 2020-06-26
Payer: COMMERCIAL

## 2020-06-26 VITALS
HEART RATE: 110 BPM | TEMPERATURE: 99.3 F | BODY MASS INDEX: 41.11 KG/M2 | DIASTOLIC BLOOD PRESSURE: 96 MMHG | OXYGEN SATURATION: 96 % | SYSTOLIC BLOOD PRESSURE: 138 MMHG | WEIGHT: 232 LBS | HEIGHT: 63 IN

## 2020-06-26 DIAGNOSIS — R00.2 PALPITATIONS: ICD-10-CM

## 2020-06-26 DIAGNOSIS — H55.00 NYSTAGMUS: ICD-10-CM

## 2020-06-26 DIAGNOSIS — R42 DIZZINESS: Primary | ICD-10-CM

## 2020-06-26 DIAGNOSIS — R00.0 TACHYCARDIA: Chronic | ICD-10-CM

## 2020-06-26 PROCEDURE — 3008F BODY MASS INDEX DOCD: CPT | Performed by: PHYSICIAN ASSISTANT

## 2020-06-26 PROCEDURE — 1036F TOBACCO NON-USER: CPT | Performed by: PHYSICIAN ASSISTANT

## 2020-06-26 PROCEDURE — 99214 OFFICE O/P EST MOD 30 MIN: CPT | Performed by: PHYSICIAN ASSISTANT

## 2020-06-26 RX ORDER — MECLIZINE HYDROCHLORIDE 25 MG/1
25 TABLET ORAL EVERY 12 HOURS PRN
Qty: 30 TABLET | Refills: 0 | Status: SHIPPED | OUTPATIENT
Start: 2020-06-26 | End: 2022-05-25

## 2020-07-10 ENCOUNTER — OFFICE VISIT (OUTPATIENT)
Dept: CARDIOLOGY CLINIC | Facility: CLINIC | Age: 25
End: 2020-07-10
Payer: COMMERCIAL

## 2020-07-10 VITALS
HEIGHT: 63 IN | WEIGHT: 230 LBS | DIASTOLIC BLOOD PRESSURE: 70 MMHG | BODY MASS INDEX: 40.75 KG/M2 | HEART RATE: 101 BPM | SYSTOLIC BLOOD PRESSURE: 100 MMHG

## 2020-07-10 DIAGNOSIS — R00.2 PALPITATIONS: ICD-10-CM

## 2020-07-10 DIAGNOSIS — R00.0 TACHYCARDIA: Primary | ICD-10-CM

## 2020-07-10 DIAGNOSIS — I42.0 DILATED CARDIOMYOPATHY (HCC): ICD-10-CM

## 2020-07-10 PROCEDURE — 3008F BODY MASS INDEX DOCD: CPT | Performed by: INTERNAL MEDICINE

## 2020-07-10 PROCEDURE — 1036F TOBACCO NON-USER: CPT | Performed by: INTERNAL MEDICINE

## 2020-07-10 PROCEDURE — 93000 ELECTROCARDIOGRAM COMPLETE: CPT | Performed by: INTERNAL MEDICINE

## 2020-07-10 PROCEDURE — 99213 OFFICE O/P EST LOW 20 MIN: CPT | Performed by: INTERNAL MEDICINE

## 2020-07-10 RX ORDER — METOPROLOL SUCCINATE 100 MG/1
150 TABLET, EXTENDED RELEASE ORAL DAILY
Qty: 60 TABLET | Refills: 3
Start: 2020-07-10 | End: 2021-01-04

## 2020-07-10 NOTE — PATIENT INSTRUCTIONS
Increase metoprolol to 150 mg daily by taking a full dose in the morning and half dose in the evening

## 2020-07-10 NOTE — PROGRESS NOTES
Patient ID: Alyson Damon is a 26 y o  male  Plan:      Dilated cardiomyopathy ([address] )  Resolved with better pulse rate control but heart rate is a bit high today  Will increase beta-blocker dose  Tachycardia  See above description  He was taking his metoprolol at night  Will switch it to taking it in the day with a half dose in the evening  Follow up Plan:  1 year EKG and follow-up visit  HPI:  Patient is seen in follow-up today regarding the above  He had a mild cardiomyopathy that was thought perhaps related to relative tachycardia  This seemed to resolve with beta-blocker  Since the patient's last visit he has felt reasonably well  He has some occasional sense of vertigo  He is here with his mom  No chest pain or chest pressure  No syncope or near syncope  Results for orders placed or performed in visit on 07/10/20   POCT ECG    Impression    Sinus tachycardia at 101 beats per minute  Otherwise normal          Most recent or relevant cardiac/vascular testing:      Echocardiogram 12/12/2018:  Mild left ventricular enlargement  Ejection fraction 45%  Echocardiogram 10/3/2019: Normal left ventricular size and function  Holter monitor 8/3/2019:  Heart rate varied between 60 and 138 beats per minute  Past Surgical History:   Procedure Laterality Date    DENTAL SURGERY      TONSILLECTOMY AND ADENOIDECTOMY      Last assessed 7/8/2014     CMP:   Lab Results   Component Value Date    K 3 8 11/07/2018     11/07/2018    CO2 27 11/07/2018    BUN 10 11/07/2018    CREATININE 0 72 11/07/2018    EGFR 132 11/07/2018       Lipid Profile: No results found for: CHOL, TRIG, HDL, LDL      Review of Systems   10  point ROS  was otherwise non pertinent or negative except as per HPI or as below     Gait: Normal         Objective:     /70   Pulse 101   Ht 5' 3" (1 6 m)   Wt 104 kg (230 lb)   BMI 40 74 kg/m²     PHYSICAL EXAM:    General:  Normal appearance in no distress  Eyes:  Anicteric  Oral mucosa:  Moist   Neck:  No JVD  Carotid upstrokes are brisk without bruits  No masses  Chest:  Clear to auscultation and percussion  Cardiac:  Normal PMI  Normal S1 and S2  No murmur gallop or rub  Abdomen:  Soft and nontender  No palpable organomegaly or aortic enlargement  Extremities:  No peripheral edema  Musculoskeletal:  Symmetric  Vascular:  Femoral pulses are brisk without bruits  Popliteal pulses are intact bilaterally  Pedal pulses are intact  Neuro:  Grossly symmetric  Psych:  Alert and oriented x3  Current Outpatient Medications:     clomiPRAMINE (ANAFRANIL) 50 mg capsule, Take 2 capsules by mouth, Disp: , Rfl:     cloZAPine (CLOZARIL) 100 mg tablet, Take 2 tablets by mouth, Disp: , Rfl:     fluticasone (FLONASE) 50 mcg/act nasal spray, 2 sprays into each nostril daily, Disp: 1 Bottle, Rfl: 0    loratadine (CLARITIN) 10 mg tablet, Take 1 tablet (10 mg total) by mouth daily (Patient taking differently: Take 10 mg by mouth as needed ), Disp: 30 tablet, Rfl: 0    meclizine (ANTIVERT) 25 mg tablet, Take 1 tablet (25 mg total) by mouth every 12 (twelve) hours as needed for dizziness, Disp: 30 tablet, Rfl: 0    metoprolol succinate (TOPROL-XL) 100 mg 24 hr tablet, Take 1 5 tablets (150 mg total) by mouth daily Take 100 mg in the morning and 50 mg in the afternoon  , Disp: 60 tablet, Rfl: 3    omeprazole (PriLOSEC) 20 mg delayed release capsule, Take by mouth daily, Disp: , Rfl: 0  Allergies   Allergen Reactions    Prednisone      DEPRESSION     Past Medical History:   Diagnosis Date    Autism     Dilated cardiomyopathy     Mononucleosis     [facility] [address] [address] [facility]     Tachycardia 12/10/2018           Social History     Tobacco Use   Smoking Status Never Smoker   Smokeless Tobacco Never Used

## 2020-07-10 NOTE — ASSESSMENT & PLAN NOTE
Resolved with better pulse rate control but heart rate is a bit high today  Will increase beta-blocker dose

## 2020-07-10 NOTE — ASSESSMENT & PLAN NOTE
See above description  He was taking his metoprolol at night  Will switch it to taking it in the day with a half dose in the evening

## 2020-09-18 ENCOUNTER — TELEPHONE (OUTPATIENT)
Dept: FAMILY MEDICINE CLINIC | Facility: CLINIC | Age: 25
End: 2020-09-18

## 2020-09-18 DIAGNOSIS — J01.90 ACUTE SINUSITIS, RECURRENCE NOT SPECIFIED, UNSPECIFIED LOCATION: ICD-10-CM

## 2020-09-18 RX ORDER — FLUTICASONE PROPIONATE 50 MCG
2 SPRAY, SUSPENSION (ML) NASAL DAILY
Qty: 2 BOTTLE | Refills: 1 | Status: SHIPPED | OUTPATIENT
Start: 2020-09-18 | End: 2021-01-06

## 2020-09-18 NOTE — TELEPHONE ENCOUNTER
Mother requests refill of Flonase as patient's allergies are acting up   Jere Heckler Congestion, draining causing a sore throat, no fever  It was ordered by this office in October 2018  To [facility]  I was torn as to whether to set up a virtual for this or asking for a refill  Mother/Darlene is prepared and amicable if a virtual is requested  Or we can let her know you approved it and sent to pharmacy

## 2020-09-30 ENCOUNTER — OFFICE VISIT (OUTPATIENT)
Dept: FAMILY MEDICINE CLINIC | Facility: CLINIC | Age: 25
End: 2020-09-30
Payer: COMMERCIAL

## 2020-09-30 VITALS
OXYGEN SATURATION: 97 % | TEMPERATURE: 96.9 F | DIASTOLIC BLOOD PRESSURE: 80 MMHG | HEIGHT: 63 IN | HEART RATE: 104 BPM | BODY MASS INDEX: 41.82 KG/M2 | WEIGHT: 236 LBS | SYSTOLIC BLOOD PRESSURE: 112 MMHG

## 2020-09-30 DIAGNOSIS — Z23 NEED FOR INFLUENZA VACCINATION: ICD-10-CM

## 2020-09-30 DIAGNOSIS — Z00.00 ANNUAL PHYSICAL EXAM: Primary | ICD-10-CM

## 2020-09-30 DIAGNOSIS — Z13.29 THYROID DISORDER SCREEN: ICD-10-CM

## 2020-09-30 DIAGNOSIS — H61.21 IMPACTED CERUMEN OF RIGHT EAR: ICD-10-CM

## 2020-09-30 DIAGNOSIS — E66.01 MORBID OBESITY WITH BMI OF 40.0-44.9, ADULT (HCC): ICD-10-CM

## 2020-09-30 DIAGNOSIS — L29.9 EAR ITCHING: ICD-10-CM

## 2020-09-30 DIAGNOSIS — E78.5 HYPERLIPIDEMIA, UNSPECIFIED HYPERLIPIDEMIA TYPE: Chronic | ICD-10-CM

## 2020-09-30 DIAGNOSIS — Z13.1 DIABETES MELLITUS SCREENING: ICD-10-CM

## 2020-09-30 PROCEDURE — 1036F TOBACCO NON-USER: CPT | Performed by: PHYSICIAN ASSISTANT

## 2020-09-30 PROCEDURE — 90686 IIV4 VACC NO PRSV 0.5 ML IM: CPT

## 2020-09-30 PROCEDURE — 99395 PREV VISIT EST AGE 18-39: CPT | Performed by: PHYSICIAN ASSISTANT

## 2020-09-30 PROCEDURE — 90471 IMMUNIZATION ADMIN: CPT

## 2020-09-30 PROCEDURE — 69210 REMOVE IMPACTED EAR WAX UNI: CPT | Performed by: PHYSICIAN ASSISTANT

## 2020-09-30 PROCEDURE — 3725F SCREEN DEPRESSION PERFORMED: CPT | Performed by: PHYSICIAN ASSISTANT

## 2020-09-30 NOTE — PATIENT INSTRUCTIONS
Wellness Visit for Adults   AMBULATORY CARE:   A wellness visit  is when you see your healthcare provider to get screened for health problems  You can also get advice on how to stay healthy  Write down your questions so you remember to ask them  Ask your healthcare provider how often you should have a wellness visit  What happens at a wellness visit:  Your healthcare provider will ask about your health, and your family history of health problems  This includes high blood pressure, heart disease, and cancer  He or she will ask if you have symptoms that concern you, if you smoke, and about your mood  You may also be asked about your intake of medicines, supplements, food, and alcohol  Any of the following may be done:  · Your weight  will be checked  Your height may also be checked so your body mass index (BMI) can be calculated  Your BMI shows if you are at a healthy weight  · Your blood pressure  and heart rate will be checked  Your temperature may also be checked  · Blood and urine tests  may be done  Blood tests may be done to check your cholesterol levels  Abnormal cholesterol levels increase your risk for heart disease and stroke  You may also need a blood or urine test to check for diabetes if you are at increased risk  Urine tests may be done to look for signs of an infection or kidney disease  · A physical exam  includes checking your heartbeat and lungs with a stethoscope  Your healthcare provider may also check your skin to look for sun damage  · Screening tests  may be recommended  A screening test is done to check for diseases that may not cause symptoms  The screening tests you may need depend on your age, gender, family history, and lifestyle habits  For example, colorectal screening may be recommended if you are 48years old or older  Screening tests you need if you are a woman:   · A Pap smear  is used to screen for cervical cancer   Pap smears are usually done every 3 to 5 years depending on your age  You may need them more often if you have had abnormal Pap smear test results in the past  Ask your healthcare provider how often you should have a Pap smear  · A mammogram  is an x-ray of your breasts to screen for breast cancer  Experts recommend mammograms every 2 years starting at age 48 years  You may need a mammogram at age 52 years or younger if you have an increased risk for breast cancer  Talk to your healthcare provider about when you should start having mammograms and how often you need them  Vaccines you may need:   · Get an influenza vaccine  every year  The influenza vaccine protects you from the flu  Several types of viruses cause the flu  The viruses change over time, so new vaccines are made each year  · Get a tetanus-diphtheria (Td) booster vaccine  every 10 years  This vaccine protects you against tetanus and diphtheria  Tetanus is a severe infection that may cause painful muscle spasms and lockjaw  Diphtheria is a severe bacterial infection that causes a thick covering in the back of your mouth and throat  · Get a human papillomavirus (HPV) vaccine  if you are female and aged 23 to 32 or male 23 to 24 and never received it  This vaccine protects you from HPV infection  HPV is the most common infection spread by sexual contact  HPV may also cause vaginal, penile, and anal cancers  · Get a pneumococcal vaccine  if you are aged 72 years or older  The pneumococcal vaccine is an injection given to protect you from pneumococcal disease  Pneumococcal disease is an infection caused by pneumococcal bacteria  The infection may cause pneumonia, meningitis, or an ear infection  · Get a shingles vaccine  if you are aged 61 or older, even if you have had shingles before  The shingles vaccine is an injection to protect you from the varicella-zoster virus  This is the same virus that causes chickenpox   Shingles is a painful rash that develops in people who had chickenpox or have been exposed to the virus  How to eat healthy:  My Plate is a model for planning healthy meals  It shows the types and amounts of foods that should go on your plate  Fruits and vegetables make up about half of your plate, and grains and protein make up the other half  A serving of dairy is included on the side of your plate  The amount of calories and serving sizes you need depends on your age, gender, weight, and height  Examples of healthy foods are listed below:  · Eat a variety of vegetables  such as dark green, red, and orange vegetables  You can also include canned vegetables low in sodium (salt) and frozen vegetables without added butter or sauces  · Eat a variety of fresh fruits , canned fruit in 100% juice, frozen fruit, and dried fruit  · Include whole grains  At least half of the grains you eat should be whole grains  Examples include whole-wheat bread, wheat pasta, brown rice, and whole-grain cereals such as oatmeal     · Eat a variety of protein foods such as seafood (fish and shellfish), lean meat, and poultry without skin (turkey and chicken)  Examples of lean meats include pork leg, shoulder, or tenderloin, and beef round, sirloin, tenderloin, and extra lean ground beef  Other protein foods include eggs and egg substitutes, beans, peas, soy products, nuts, and seeds  · Choose low-fat dairy products such as skim or 1% milk or low-fat yogurt, cheese, and cottage cheese  · Limit unhealthy fats  such as butter, hard margarine, and shortening  Exercise:  Exercise at least 30 minutes per day on most days of the week  Some examples of exercise include walking, biking, dancing, and swimming  You can also fit in more physical activity by taking the stairs instead of the elevator or parking farther away from stores  Include muscle strengthening activities 2 days each week  Regular exercise provides many health benefits   It helps you manage your weight, and decreases your risk for type 2 diabetes, heart disease, stroke, and high blood pressure  Exercise can also help improve your mood  Ask your healthcare provider about the best exercise plan for you  General health and safety guidelines:   · Do not smoke  Nicotine and other chemicals in cigarettes and cigars can cause lung damage  Ask your healthcare provider for information if you currently smoke and need help to quit  E-cigarettes or smokeless tobacco still contain nicotine  Talk to your healthcare provider before you use these products  · Limit alcohol  A drink of alcohol is 12 ounces of beer, 5 ounces of wine, or 1½ ounces of liquor  · Lose weight, if needed  Being overweight increases your risk of certain health conditions  These include heart disease, high blood pressure, type 2 diabetes, and certain types of cancer  · Protect your skin  Do not sunbathe or use tanning beds  Use sunscreen with a SPF 15 or higher  Apply sunscreen at least 15 minutes before you go outside  Reapply sunscreen every 2 hours  Wear protective clothing, hats, and sunglasses when you are outside  · Drive safely  Always wear your seatbelt  Make sure everyone in your car wears a seatbelt  A seatbelt can save your life if you are in an accident  Do not use your cell phone when you are driving  This could distract you and cause an accident  Pull over if you need to make a call or send a text message  · Practice safe sex  Use latex condoms if are sexually active and have more than one partner  Your healthcare provider may recommend screening tests for sexually transmitted infections (STIs)  · Wear helmets, lifejackets, and protective gear  Always wear a helmet when you ride a bike or motorcycle, go skiing, or play sports that could cause a head injury  Wear protective equipment when you play sports  Wear a lifejacket when you are on a boat or doing water sports    © 2017 [employer] Information is for End User's use only and may not be sold, redistributed or otherwise used for commercial purposes  All illustrations and images included in CareNotes® are the copyrighted property of [employer]  or [employer] Malin  The above information is an  only  It is not intended as medical advice for individual conditions or treatments  Talk to your doctor, nurse or pharmacist before following any medical regimen to see if it is safe and effective for you  Weight Management   AMBULATORY CARE:   Why it is important to manage your weight:  Being overweight increases your risk of health conditions such as heart disease, high blood pressure, type 2 diabetes, and certain types of cancer  It can also increase your risk for osteoarthritis, sleep apnea, and other respiratory problems  Aim for a slow, steady weight loss  Even a small amount of weight loss can lower your risk of health problems  How to lose weight safely:  A safe and healthy way to lose weight is to eat fewer calories and get regular exercise  You can lose up about 1 pound a week by decreasing the number of calories you eat by 500 calories each day  You can decrease calories by eating smaller portion sizes or by cutting out high-calorie foods  Read labels to find out how many calories are in the foods you eat  You can also burn calories with exercise such as walking, swimming, or biking  You will be more likely to keep weight off if you make these changes part of your lifestyle  Healthy meal plan for weight management:  A healthy meal plan includes a variety of foods, contains fewer calories, and helps you stay healthy  A healthy meal plan includes the following:  · Eat whole-grain foods more often  A healthy meal plan should contain fiber  Fiber is the part of grains, fruits, and vegetables that is not broken down by your body  Whole-grain foods are healthy and provide extra fiber in your diet   Some examples of whole-grain foods are whole-wheat breads and pastas, oatmeal, brown rice, and bulgur  · Eat a variety of vegetables every day  Include dark, leafy greens such as spinach, kale, gabe greens, and mustard greens  Eat yellow and orange vegetables such as carrots, sweet potatoes, and winter squash  · Eat a variety of fruits every day  Choose fresh or canned fruit (canned in its own juice or light syrup) instead of juice  Fruit juice has very little or no fiber  · Eat low-fat dairy foods  Drink fat-free (skim) milk or 1% milk  Eat fat-free yogurt and low-fat cottage cheese  Try low-fat cheeses such as mozzarella and other reduced-fat cheeses  · Choose meat and other protein foods that are low in fat  Choose beans or other legumes such as split peas or lentils  Choose fish, skinless poultry (chicken or turkey), or lean cuts of red meat (beef or pork)  Before you cook meat or poultry, cut off any visible fat  · Use less fat and oil  Try baking foods instead of frying them  Add less fat, such as margarine, sour cream, regular salad dressing and mayonnaise to foods  Eat fewer high-fat foods  Some examples of high-fat foods include french fries, doughnuts, ice cream, and cakes  · Eat fewer sweets  Limit foods and drinks that are high in sugar  This includes candy, cookies, regular soda, and sweetened drinks  Ways to decrease calories:   · Eat smaller portions  ¨ Use a small plate with smaller servings  ¨ Do not eat second helpings  ¨ When you eat at a restaurant, ask for a box and place half of your meal in the box before you eat  ¨ Share an entrée with someone else  · Replace high-calorie snacks with healthy, low-calorie snacks  ¨ Choose fresh fruit, vegetables, fat-free rice cakes, or air-popped popcorn instead of potato chips, nuts, or chocolate  ¨ Choose water or calorie-free drinks instead of soda or sweetened drinks  · Eat regular meals  Skipping meals can lead to overeating later in the day   Eat a healthy snack in place of a meal if you do not have time to eat a regular meal      · Do not shop for groceries when you are hungry  You may be more likely to make unhealthy food choices  Take a grocery list of healthy foods and shop after you have eaten  Exercise:  Exercise at least 30 minutes per day on most days of the week  Some examples of exercise include walking, biking, dancing, and swimming  You can also fit in more physical activity by taking the stairs instead of the elevator or parking farther away from stores  Ask your healthcare provider about the best exercise plan for you  Other things to consider as you try to lose weight:   · Be aware of situations that may give you the urge to overeat, such as eating while watching television  Find ways to avoid these situations  For example, read a book, go for a walk, or do crafts  · Meet with a weight loss support group or friends who are also trying to lose weight  This may help you stay motivated to continue working on your weight loss goals  © 2017 [employer] Information is for End User's use only and may not be sold, redistributed or otherwise used for commercial purposes  All illustrations and images included in CareNotes® are the copyrighted property of [employer]  or [employer] Malin  The above information is an  only  It is not intended as medical advice for individual conditions or treatments  Talk to your doctor, nurse or pharmacist before following any medical regimen to see if it is safe and effective for you

## 2020-09-30 NOTE — PROGRESS NOTES
[facility]    NAME: Hedy Pruett  AGE: 26 y o  SEX: male  : 1995     DATE: 2020     Assessment and Plan:     Problem List Items Addressed This Visit        Other    Hyperlipidemia (Chronic)    Relevant Orders    Lipid panel      Other Visit Diagnoses     Annual physical exam    -  Primary    Thyroid disorder screen        Relevant Orders    TSH, 3rd generation with Free T4 reflex    Diabetes mellitus screening        Relevant Orders    Comprehensive metabolic panel    Need for influenza vaccination        Relevant Orders    influenza vaccine, quadrivalent, 0 5 mL, preservative-free, for adult and pediatric patients 6 mos+ (AFLURIA, FLUARIX, FLULAVAL, FLUZONE)    Morbid obesity with BMI of 40 0-44 9, adult (HCC)        Ear itching        Relevant Orders    Ear cerumen removal    Impacted cerumen of right ear        Relevant Orders    Ear cerumen removal          Immunizations and preventive care screenings were discussed with patient today  Appropriate education was printed on patient's after visit summary  Counseling:  Dental Health: discussed importance of regular tooth brushing, flossing, and dental visits  · Exercise: the importance of regular exercise/physical activity was discussed  Recommend exercise 3-5 times per week for at least 30 minutes  BMI Counseling: Body mass index is 41 81 kg/m²  The BMI is above normal  Nutrition recommendations include decreasing portion sizes, encouraging healthy choices of fruits and vegetables, consuming healthier snacks, limiting drinks that contain sugar, moderation in carbohydrate intake and reducing intake of saturated and trans fat  Exercise recommendations include exercising 3-5 times per week  Decrease cookies every night            Return in 1 year (on 2021)       Chief Complaint:     Chief Complaint   Patient presents with    Physical Exam     No Concerns at this time       History of Present Illness:     Adult Annual Physical   Patient here for a comprehensive physical exam    His R ear gets itchy, then he picks and then he feels like he cannot hear  He goes to therapy every other week  He does art therapy in [address]  Diet and Physical Activity  · Diet/Nutrition: needs to improve junk food, portions  · Exercise: walking  Depression Screening  PHQ-9 Depression Screening    PHQ-9:    Frequency of the following problems over the past two weeks:       Little interest or pleasure in doing things:  0 - not at all  Feeling down, depressed, or hopeless:  1 - several days  PHQ-2 Score:  1       General Health  · Sleep: sleeps well and gets 7-8 hours of sleep on average  · Hearing: normal - bilateral   · Vision: no vision problems  · Dental: regular dental visits and he is due for another visit   [facility]  · History of STDs?: no   · Never sexually active      Review of Systems:     Review of Systems   Constitutional: Negative  HENT: Positive for hearing loss  Ear itchy     Respiratory: Negative  Cardiovascular: Negative  Palpitations: resolved  Gastrointestinal: Negative  Genitourinary: Negative  Skin: Negative  Neurological: Negative  Dizziness: resolved  Psychiatric/Behavioral: Positive for dysphoric mood  Negative for self-injury        Past Medical History:     Past Medical History:   Diagnosis Date    Autism     Dilated cardiomyopathy     Mononucleosis     [facility]     Tachycardia 12/10/2018      Past Surgical History:     Past Surgical History:   Procedure Laterality Date    DENTAL SURGERY      TONSILLECTOMY AND ADENOIDECTOMY      Last assessed 7/8/2014      Social History:        Social History     Socioeconomic History    Marital status: Single     Spouse name: None    Number of children: None    Years of education: None    Highest education level: None   Occupational History    None   Social Needs  Financial resource strain: None    Food insecurity     Worry: None     Inability: None    Transportation needs     Medical: None     Non-medical: None   Tobacco Use    Smoking status: Never Smoker    Smokeless tobacco: Never Used   Substance and Sexual Activity    Alcohol use: No    Drug use: No    Sexual activity: None   Lifestyle    Physical activity     Days per week: None     Minutes per session: None    Stress: None   Relationships    Social connections     Talks on phone: None     Gets together: None     Attends Hinduism service: None     Active member of club or organization: None     Attends meetings of clubs or organizations: None     Relationship status: None    Intimate partner violence     Fear of current or ex partner: None     Emotionally abused: None     Physically abused: None     Forced sexual activity: None   Other Topics Concern    None   Social History Narrative    Uses seatbelts      Family History:     Family History   Problem Relation Age of Onset    No Known Problems Father     Cancer Family     Diabetes Family     Hyperlipidemia Family     Stroke Family       Current Medications:     Current Outpatient Medications   Medication Sig Dispense Refill    clomiPRAMINE (ANAFRANIL) 50 mg capsule Take 2 capsules by mouth      cloZAPine (CLOZARIL) 100 mg tablet Take 2 tablets by mouth      fluticasone (FLONASE) 50 mcg/act nasal spray 2 sprays into each nostril daily 2 Bottle 1    loratadine (CLARITIN) 10 mg tablet Take 1 tablet (10 mg total) by mouth daily (Patient taking differently: Take 10 mg by mouth as needed ) 30 tablet 0    meclizine (ANTIVERT) 25 mg tablet Take 1 tablet (25 mg total) by mouth every 12 (twelve) hours as needed for dizziness 30 tablet 0    metoprolol succinate (TOPROL-XL) 100 mg 24 hr tablet Take 1 5 tablets (150 mg total) by mouth daily Take 100 mg in the morning and 50 mg in the afternoon   60 tablet 3    omeprazole (PriLOSEC) 20 mg delayed release capsule Take by mouth daily  0     No current facility-administered medications for this visit  Allergies: Allergies   Allergen Reactions    Prednisone      DEPRESSION      Physical Exam:     /80 (BP Location: Left arm, Patient Position: Sitting, Cuff Size: Large)   Pulse 104   Temp (!) 96 9 °F (36 1 °C) (Temporal)   Ht 5' 3" (1 6 m)   Wt 107 kg (236 lb)   SpO2 97%   BMI 41 81 kg/m²       Physical Exam  Constitutional:       General: He is not in acute distress  Appearance: Normal appearance  He is obese  He is not diaphoretic  HENT:      Head: Normocephalic and atraumatic  Right Ear: Tympanic membrane, ear canal and external ear normal       Left Ear: Tympanic membrane, ear canal and external ear normal       Nose: Nose normal       Mouth/Throat:      Mouth: Mucous membranes are moist       Pharynx: Oropharynx is clear  Eyes:      Conjunctiva/sclera: Conjunctivae normal       Pupils: Pupils are equal, round, and reactive to light  Neck:      Musculoskeletal: Normal range of motion and neck supple  Cardiovascular:      Rate and Rhythm: Normal rate and regular rhythm  Pulses: Normal pulses  Pulmonary:      Effort: Pulmonary effort is normal       Breath sounds: Normal breath sounds  No wheezing  Abdominal:      General: Bowel sounds are normal       Palpations: Abdomen is soft  Tenderness: There is no abdominal tenderness  Musculoskeletal:         General: No deformity or signs of injury  Right lower leg: No edema  Left lower leg: No edema  Skin:     General: Skin is warm and dry  Findings: No erythema or rash  Neurological:      General: No focal deficit present  Mental Status: He is alert and oriented to person, place, and time  Cranial Nerves: No cranial nerve deficit  Psychiatric:         Mood and Affect: Mood normal          Behavior: Behavior normal          Thought Content:  Thought content normal          Judgment: Judgment normal       Ear cerumen removal    Date/Time: 9/30/2020 4:06 PM  Performed by: Annabella Mcconnell PA-C  Authorized by: Annabella Mcconnell PA-C     Patient location:  Clinic  Other Assisting Provider: No    Consent:     Consent obtained:  Verbal    Risks discussed:  Pain, infection, incomplete removal and dizziness    Alternatives discussed:  No treatment  Procedure details:     Local anesthetic:  None    Location:  R ear    Procedure type: irrigation with instrumentation      Instrumentation: curette      Approach:  External and natural orifice  Post-procedure details:     Complication:  None    Hearing quality:  Improved    Patient tolerance of procedure:   Tolerated well, no immediate complications  Comments:      Large impacted cerumen successfully removed and TM/canal clear         Annabella Mcconnell PA-C   [facility]

## 2020-10-26 ENCOUNTER — TELEPHONE (OUTPATIENT)
Dept: CARDIOLOGY CLINIC | Facility: CLINIC | Age: 25
End: 2020-10-26

## 2020-12-04 ENCOUNTER — TELEMEDICINE (OUTPATIENT)
Dept: FAMILY MEDICINE CLINIC | Facility: CLINIC | Age: 25
End: 2020-12-04
Payer: COMMERCIAL

## 2020-12-04 DIAGNOSIS — E55.9 VITAMIN D DEFICIENCY: ICD-10-CM

## 2020-12-04 DIAGNOSIS — E03.9 HYPOTHYROIDISM, UNSPECIFIED TYPE: ICD-10-CM

## 2020-12-04 DIAGNOSIS — E78.1 HYPERTRIGLYCERIDEMIA: Primary | ICD-10-CM

## 2020-12-04 DIAGNOSIS — K76.0 FATTY LIVER: ICD-10-CM

## 2020-12-04 PROCEDURE — 99214 OFFICE O/P EST MOD 30 MIN: CPT | Performed by: PHYSICIAN ASSISTANT

## 2020-12-04 PROCEDURE — 1036F TOBACCO NON-USER: CPT | Performed by: PHYSICIAN ASSISTANT

## 2020-12-04 RX ORDER — FENOFIBRATE 54 MG/1
54 TABLET ORAL DAILY
Qty: 30 TABLET | Refills: 2 | Status: SHIPPED | OUTPATIENT
Start: 2020-12-04 | End: 2021-01-11 | Stop reason: SDUPTHER

## 2020-12-04 RX ORDER — LEVOTHYROXINE SODIUM 0.05 MG/1
50 TABLET ORAL DAILY
Qty: 90 TABLET | Refills: 0 | Status: SHIPPED | OUTPATIENT
Start: 2020-12-04 | End: 2021-01-11 | Stop reason: SDUPTHER

## 2020-12-05 ENCOUNTER — LAB (OUTPATIENT)
Dept: LAB | Facility: HOSPITAL | Age: 25
End: 2020-12-05
Payer: COMMERCIAL

## 2020-12-05 ENCOUNTER — TRANSCRIBE ORDERS (OUTPATIENT)
Dept: LAB | Facility: HOSPITAL | Age: 25
End: 2020-12-05

## 2020-12-05 DIAGNOSIS — Z51.81 ENCOUNTER FOR THERAPEUTIC DRUG MONITORING: ICD-10-CM

## 2020-12-05 DIAGNOSIS — Z51.81 ENCOUNTER FOR THERAPEUTIC DRUG MONITORING: Primary | ICD-10-CM

## 2020-12-05 LAB
BASOPHILS # BLD AUTO: 0 THOUSANDS/ΜL (ref 0–0.1)
BASOPHILS NFR BLD AUTO: 0 % (ref 0–2)
EOSINOPHIL # BLD AUTO: 0 THOUSAND/ΜL (ref 0–0.61)
EOSINOPHIL NFR BLD AUTO: 0 % (ref 0–5)
ERYTHROCYTE [DISTWIDTH] IN BLOOD BY AUTOMATED COUNT: 13.8 % (ref 11.5–14.5)
HCT VFR BLD AUTO: 44.9 % (ref 42–47)
HGB BLD-MCNC: 15.4 G/DL (ref 14–18)
LYMPHOCYTES # BLD AUTO: 3.1 THOUSANDS/ΜL (ref 0.6–4.47)
LYMPHOCYTES NFR BLD AUTO: 48 % (ref 21–51)
MCH RBC QN AUTO: 30.8 PG (ref 26–34)
MCHC RBC AUTO-ENTMCNC: 34.2 G/DL (ref 31–37)
MCV RBC AUTO: 90 FL (ref 81–99)
MONOCYTES # BLD AUTO: 0.4 THOUSAND/ΜL (ref 0.17–1.22)
MONOCYTES NFR BLD AUTO: 7 % (ref 2–12)
NEUTROPHILS # BLD AUTO: 2.8 THOUSANDS/ΜL (ref 1.4–6.5)
NEUTS SEG NFR BLD AUTO: 44 % (ref 42–75)
PLATELET # BLD AUTO: 238 THOUSANDS/UL (ref 149–390)
PMV BLD AUTO: 8.2 FL (ref 8.6–11.7)
RBC # BLD AUTO: 4.99 MILLION/UL (ref 4.3–5.9)
WBC # BLD AUTO: 6.4 THOUSAND/UL (ref 4.8–10.8)

## 2020-12-05 PROCEDURE — 36415 COLL VENOUS BLD VENIPUNCTURE: CPT

## 2020-12-05 PROCEDURE — 85025 COMPLETE CBC W/AUTO DIFF WBC: CPT

## 2021-01-02 DIAGNOSIS — I42.0 DILATED CARDIOMYOPATHY (HCC): ICD-10-CM

## 2021-01-02 DIAGNOSIS — R00.0 TACHYCARDIA: ICD-10-CM

## 2021-01-04 RX ORDER — METOPROLOL SUCCINATE 100 MG/1
TABLET, EXTENDED RELEASE ORAL
Qty: 60 TABLET | Refills: 3 | Status: SHIPPED | OUTPATIENT
Start: 2021-01-04 | End: 2021-01-11 | Stop reason: SDUPTHER

## 2021-01-05 DIAGNOSIS — J01.90 ACUTE SINUSITIS, RECURRENCE NOT SPECIFIED, UNSPECIFIED LOCATION: ICD-10-CM

## 2021-01-06 RX ORDER — FLUTICASONE PROPIONATE 50 MCG
SPRAY, SUSPENSION (ML) NASAL
Qty: 32 G | Refills: 1 | Status: SHIPPED | OUTPATIENT
Start: 2021-01-06

## 2021-01-11 DIAGNOSIS — R00.0 TACHYCARDIA: ICD-10-CM

## 2021-01-11 DIAGNOSIS — I42.0 DILATED CARDIOMYOPATHY (HCC): ICD-10-CM

## 2021-01-11 DIAGNOSIS — K76.0 FATTY LIVER: ICD-10-CM

## 2021-01-11 DIAGNOSIS — E03.9 HYPOTHYROIDISM, UNSPECIFIED TYPE: ICD-10-CM

## 2021-01-11 DIAGNOSIS — E78.1 HYPERTRIGLYCERIDEMIA: ICD-10-CM

## 2021-01-11 RX ORDER — LEVOTHYROXINE SODIUM 0.05 MG/1
50 TABLET ORAL DAILY
Qty: 90 TABLET | Refills: 1 | Status: SHIPPED | OUTPATIENT
Start: 2021-01-11 | End: 2021-05-28

## 2021-01-11 RX ORDER — FENOFIBRATE 54 MG/1
54 TABLET ORAL DAILY
Qty: 90 TABLET | Refills: 1 | Status: SHIPPED | OUTPATIENT
Start: 2021-01-11 | End: 2021-03-17 | Stop reason: SDUPTHER

## 2021-01-11 NOTE — TELEPHONE ENCOUNTER
Patients mother requesting refills of Levothyroxine and Feneofibrate  Since its a new year, patient now needs to use [facility]      Last appointment was 12/4/20 and next appointment is 3/8/21

## 2021-01-12 RX ORDER — METOPROLOL SUCCINATE 100 MG/1
100 TABLET, EXTENDED RELEASE ORAL DAILY
Qty: 90 TABLET | Refills: 3 | Status: SHIPPED | OUTPATIENT
Start: 2021-01-12 | End: 2021-10-06 | Stop reason: SDUPTHER

## 2021-03-08 ENCOUNTER — OFFICE VISIT (OUTPATIENT)
Dept: FAMILY MEDICINE CLINIC | Facility: CLINIC | Age: 26
End: 2021-03-08
Payer: COMMERCIAL

## 2021-03-08 DIAGNOSIS — L30.9 ECZEMA OF LEFT HAND: ICD-10-CM

## 2021-03-08 DIAGNOSIS — Z02.0 SCHOOL PHYSICAL EXAM: ICD-10-CM

## 2021-03-08 DIAGNOSIS — R00.0 TACHYCARDIA: ICD-10-CM

## 2021-03-08 DIAGNOSIS — L84 CORN OR CALLUS: ICD-10-CM

## 2021-03-08 DIAGNOSIS — E66.01 MORBID OBESITY (HCC): ICD-10-CM

## 2021-03-08 DIAGNOSIS — E78.1 ESSENTIAL HYPERTRIGLYCERIDEMIA: Primary | ICD-10-CM

## 2021-03-08 DIAGNOSIS — Z11.1 TUBERCULOSIS SCREENING: ICD-10-CM

## 2021-03-08 DIAGNOSIS — E03.9 HYPOTHYROIDISM, UNSPECIFIED TYPE: ICD-10-CM

## 2021-03-08 PROCEDURE — 99214 OFFICE O/P EST MOD 30 MIN: CPT | Performed by: PHYSICIAN ASSISTANT

## 2021-03-08 RX ORDER — CLOZAPINE 25 MG/1
25 TABLET ORAL DAILY
COMMUNITY
Start: 2021-03-08 | End: 2022-05-25

## 2021-03-08 NOTE — PROGRESS NOTES
Routine Follow-up    Javid Flannery 26 y o  male   Date:  3/8/2021      Assessment and Plan:    Angella Mejia was seen today for follow-up  Diagnoses and all orders for this visit:    Essential hypertriglyceridemia  - obtain lipid panel  - on fenofibrate    Morbid obesity ([address] )  - appreciate nutrition guidance    Hypothyroidism, unspecified type  - obtain thyroid labs    Tuberculosis screening  -     Quantiferon TB Gold Plus; Future  - after visit, came back for 2 step ppd due to school requirements    Eczema of left hand  - can apply topical otc cortisone cream sparingly  - moisturize regularly    Corn or callus  -     Ambulatory referral to Podiatry; Future    School physical exam  - UTD on physicals  - requires TB screening  - hearing test complete    Tachycardia  - stable on metoprolol  - appreciate cardiology follow up   - no changes          BMI Counseling: Body mass index is 41 95 kg/m²  The BMI is above normal  Nutrition recommendations include decreasing portion sizes, encouraging healthy choices of fruits and vegetables and reducing intake of saturated and trans fat  Patient referred to nutritionist and weight management due to patient being overweight  HPI:  Chief Complaint   Patient presents with    Follow-up     3 mo f/u, also ppwk to be completed     HPI   Patient is a 27 yo male who presents for 3 month follow up after starting synthroid and restarting fenofibrate  Unfortunately, repeat labs for thyroid and cholesterol were not done prior to appt  His BP and HR are stable on BB  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  He and mom are very anxious for him to get the COVID vaccine  He does require TB screening  He has upcoming appt to establish with nutritional counseling  ROS: Review of Systems   Constitutional: Negative for appetite change, diaphoresis and fever  Respiratory: Negative  Cardiovascular: Negative  Gastrointestinal: Negative      Genitourinary: Negative  Musculoskeletal: Negative  Skin: Positive for rash (dry patch of skin on L dorsal hand)  Callused and thickened skin of feet/great toe     Neurological: Negative      Psychiatric/Behavioral:        Autism       Past Medical History:   Diagnosis Date    Autism     Dilated cardiomyopathy     Mononucleosis     [facility]ia ([facility] )     Tachycardia 12/10/2018     Patient Active Problem List   Diagnosis    Abnormal EKG    Autistic disorder, active    Depression    Eczema    Essential hypertriglyceridemia    Hyperlipidemia    Left-sided Bell's palsy    Morbid obesity ([facility] )    PDD (pervasive developmental disorder)    Plantar warts    Chronic schizophreniform disorder (HCC)    DARREN (obstructive sleep apnea)    Vitamin D deficiency    Lactic acidosis    Tachycardia    Periumbilical abdominal pain    Hypophosphatemia    Hypomagnesemia    Hypokalemia    Dilated cardiomyopathy ([facility] )    Encounter for examination to participate in special olympics    Palpitations    Snoring       Past Surgical History:   Procedure Laterality Date    DENTAL SURGERY      TONSILLECTOMY AND ADENOIDECTOMY      Last assessed 7/8/2014       Social History     Socioeconomic History    Marital status: Single     Spouse name: None    Number of children: None    Years of education: None    Highest education level: None   Occupational History    None   Social Needs    Financial resource strain: None    Food insecurity     Worry: None     Inability: None    Transportation needs     Medical: None     Non-medical: None   Tobacco Use    Smoking status: Never Smoker    Smokeless tobacco: Never Used   Substance and Sexual Activity    Alcohol use: No    Drug use: No    Sexual activity: None   Lifestyle    Physical activity     Days per week: None     Minutes per session: None    Stress: None   Relationships    Social connections     Talks on phone: None     Gets together: None     Attends Holiness service: None     Active member of club or organization: None     Attends meetings of clubs or organizations: None     Relationship status: None    Intimate partner violence     Fear of current or ex partner: None     Emotionally abused: None     Physically abused: None     Forced sexual activity: None   Other Topics Concern    None   Social History Narrative    Uses seatbelts       Family History   Problem Relation Age of Onset    No Known Problems Father     Cancer Family     Diabetes Family     Hyperlipidemia Family     Stroke Family        Allergies   Allergen Reactions    Prednisone      DEPRESSION         Current Outpatient Medications:     clomiPRAMINE (ANAFRANIL) 50 mg capsule, Take 2 capsules by mouth, Disp: , Rfl:     cloZAPine (CLOZARIL) 100 mg tablet, Take 2 tablets by mouth, Disp: , Rfl:     cloZAPine (CLOZARIL) 25 mg tablet, Take 25 mg by mouth daily In the morning, Disp: , Rfl:     fenofibrate (TRICOR) 54 MG tablet, Take 1 tablet (54 mg total) by mouth daily, Disp: 90 tablet, Rfl: 1    fluticasone (FLONASE) 50 mcg/act nasal spray, instill 2 sprays into each nostril daily, Disp: 32 g, Rfl: 1    levothyroxine 50 mcg tablet, Take 1 tablet (50 mcg total) by mouth daily, Disp: 90 tablet, Rfl: 1    loratadine (CLARITIN) 10 mg tablet, Take 1 tablet (10 mg total) by mouth daily (Patient taking differently: Take 10 mg by mouth as needed ), Disp: 30 tablet, Rfl: 0    meclizine (ANTIVERT) 25 mg tablet, Take 1 tablet (25 mg total) by mouth every 12 (twelve) hours as needed for dizziness, Disp: 30 tablet, Rfl: 0    metoprolol succinate (TOPROL-XL) 100 mg 24 hr tablet, Take 1 tablet (100 mg total) by mouth daily, Disp: 90 tablet, Rfl: 3    omeprazole (PriLOSEC) 20 mg delayed release capsule, Take by mouth daily, Disp: , Rfl: 0      Physical Exam:  /78 (BP Location: Left arm, Patient Position: Sitting, Cuff Size: Large)   Pulse 102 Comment: manual  Temp 98 5 °F (36 9 °C) (Temporal)   Ht 5' 3 5" (1 613 m)   Wt 109 kg (240 lb 9 6 oz)   SpO2 96%   BMI 41 95 kg/m²     Physical Exam  Constitutional:       General: He is not in acute distress  Appearance: Normal appearance  He is obese  HENT:      Head: Normocephalic and atraumatic  Right Ear: Tympanic membrane, ear canal and external ear normal       Left Ear: Tympanic membrane, ear canal and external ear normal       Nose: Nose normal       Mouth/Throat:      Mouth: Mucous membranes are moist       Pharynx: Oropharynx is clear  Eyes:      Conjunctiva/sclera: Conjunctivae normal       Pupils: Pupils are equal, round, and reactive to light  Cardiovascular:      Rate and Rhythm: Regular rhythm  Tachycardia present  Pulses: Normal pulses  Pulmonary:      Effort: Pulmonary effort is normal  No respiratory distress  Breath sounds: Normal breath sounds  Musculoskeletal:         General: No deformity or signs of injury  Skin:     General: Skin is warm and dry  Coloration: Skin is not pale  Findings: Rash (small mild patch of dry skin on L dorsal hand; callused and thickening skin of L great toe) present  Neurological:      General: No focal deficit present  Mental Status: He is alert and oriented to person, place, and time  Psychiatric:         Attention and Perception: Attention normal          Mood and Affect: Mood is anxious  Behavior: Behavior normal  Behavior is cooperative  Labs:  Lab Results   Component Value Date    WBC 6 40 12/05/2020    HGB 15 4 12/05/2020    HCT 44 9 12/05/2020    MCV 90 12/05/2020     12/05/2020     Lab Results   Component Value Date    K 3 8 11/07/2018     11/07/2018    CO2 27 11/07/2018    BUN 10 11/07/2018    CREATININE 0 72 11/07/2018    GLUF 83 11/07/2018    CALCIUM 9 2 11/07/2018    AST 27 11/07/2018    ALT 45 11/07/2018    ALKPHOS 55 11/07/2018    EGFR 132 11/07/2018       Hearing Screening (03/08/2021)  Edited by:  Pati [address] [place] [address] [id number] 500hz 1000hz 2000hz 3000hz 4000hz 6000hz 8000hz   Right ear   20 20 20  20     Left ear   20 20 20  20     Hearing Screening (10/09/2018)  Edited by: Hilda Socks   125hz 250hz 500hz 1000hz 2000hz 3000hz 4000hz 6000hz 8000hz   Right ear   25 25 25  25     Left ear   25 25 25  25

## 2021-03-09 VITALS
BODY MASS INDEX: 41.07 KG/M2 | HEIGHT: 64 IN | TEMPERATURE: 98.5 F | HEART RATE: 102 BPM | OXYGEN SATURATION: 96 % | SYSTOLIC BLOOD PRESSURE: 114 MMHG | DIASTOLIC BLOOD PRESSURE: 78 MMHG | WEIGHT: 240.6 LBS

## 2021-03-10 ENCOUNTER — CLINICAL SUPPORT (OUTPATIENT)
Dept: FAMILY MEDICINE CLINIC | Facility: CLINIC | Age: 26
End: 2021-03-10
Payer: COMMERCIAL

## 2021-03-10 DIAGNOSIS — Z23 ENCOUNTER FOR IMMUNIZATION: ICD-10-CM

## 2021-03-10 DIAGNOSIS — Z11.1 ENCOUNTER FOR PPD TEST: Primary | ICD-10-CM

## 2021-03-10 PROCEDURE — 86580 TB INTRADERMAL TEST: CPT

## 2021-03-12 ENCOUNTER — CLINICAL SUPPORT (OUTPATIENT)
Dept: FAMILY MEDICINE CLINIC | Facility: CLINIC | Age: 26
End: 2021-03-12

## 2021-03-12 DIAGNOSIS — Z11.1 ENCOUNTER FOR PPD SKIN TEST READING: Primary | ICD-10-CM

## 2021-03-12 LAB
INDURATION: 0 MM
TB SKIN TEST: NEGATIVE

## 2021-03-12 NOTE — PROGRESS NOTES
Pt is here for a PPD read, administered 3/10/2021 in the left forearm      Results:  Negative: 0mm induration

## 2021-03-17 ENCOUNTER — TELEPHONE (OUTPATIENT)
Dept: FAMILY MEDICINE CLINIC | Facility: CLINIC | Age: 26
End: 2021-03-17

## 2021-03-17 DIAGNOSIS — K76.0 FATTY LIVER: ICD-10-CM

## 2021-03-17 DIAGNOSIS — R79.89 ELEVATED LFTS: Primary | ICD-10-CM

## 2021-03-17 DIAGNOSIS — Z11.1 ENCOUNTER FOR PPD TEST: Primary | ICD-10-CM

## 2021-03-17 DIAGNOSIS — E78.1 HYPERTRIGLYCERIDEMIA: ICD-10-CM

## 2021-03-17 RX ORDER — FENOFIBRATE 120 MG/1
120 TABLET ORAL DAILY
Qty: 90 TABLET | Refills: 1 | Status: SHIPPED | OUTPATIENT
Start: 2021-03-17 | End: 2021-04-19 | Stop reason: SDUPTHER

## 2021-03-17 NOTE — TELEPHONE ENCOUNTER
Patient is scheduled on the nurse visit 3/22  Patient is scheduled for a PPD   Please sign off on future order

## 2021-03-18 ENCOUNTER — CONSULT (OUTPATIENT)
Dept: BARIATRICS | Facility: CLINIC | Age: 26
End: 2021-03-18
Payer: COMMERCIAL

## 2021-03-18 VITALS
TEMPERATURE: 98.7 F | BODY MASS INDEX: 41.99 KG/M2 | RESPIRATION RATE: 18 BRPM | SYSTOLIC BLOOD PRESSURE: 122 MMHG | DIASTOLIC BLOOD PRESSURE: 74 MMHG | WEIGHT: 237 LBS | HEIGHT: 63 IN | HEART RATE: 96 BPM

## 2021-03-18 DIAGNOSIS — E66.01 MORBID OBESITY (HCC): Primary | ICD-10-CM

## 2021-03-18 DIAGNOSIS — F20.81: Chronic | ICD-10-CM

## 2021-03-18 DIAGNOSIS — E78.1 HYPERTRIGLYCERIDEMIA: ICD-10-CM

## 2021-03-18 DIAGNOSIS — E78.5 HYPERLIPIDEMIA, UNSPECIFIED HYPERLIPIDEMIA TYPE: Chronic | ICD-10-CM

## 2021-03-18 DIAGNOSIS — R00.0 TACHYCARDIA: Chronic | ICD-10-CM

## 2021-03-18 PROCEDURE — 99204 OFFICE O/P NEW MOD 45 MIN: CPT | Performed by: PHYSICIAN ASSISTANT

## 2021-03-18 PROCEDURE — 1036F TOBACCO NON-USER: CPT | Performed by: PHYSICIAN ASSISTANT

## 2021-03-18 NOTE — ASSESSMENT & PLAN NOTE
-Discussed options of [facility] Lifestyle Intervention Program and Conservative Program and the role of weight loss medications  Not a candidate for bariatric surgery  -Initial weight loss goal of 5-10% weight loss for improved health  -Screening labs: reviewed labs  -Patient is interested in pursuing conservative program    +STOP BANG (5/8):  -reviewed risks of undiagnosed/untreated sleep apnea  -Recommended referral to sleep medicine provider for further evaluation   -Home study inconclusive  In lab PSG recommended  Mother states she follow up with sleep medicine to schedule   Reports it was previously cancelled due to COVID

## 2021-03-18 NOTE — PROGRESS NOTES
Assessment/Plan: Morbid obesity ([facility] )  -Discussed options of [facility] Lifestyle Intervention Program and Conservative Program and the role of weight loss medications  Not a candidate for bariatric surgery  -Initial weight loss goal of 5-10% weight loss for improved health  -Screening labs: reviewed labs  -Patient is interested in pursuing conservative program    +STOP BANG (5/8):  -reviewed risks of undiagnosed/untreated sleep apnea  -Recommended referral to sleep medicine provider for further evaluation   -Home study inconclusive  In lab PSG recommended  Mother states she follow up with sleep medicine to schedule  Reports it was previously cancelled due to COVID    Tachycardia  -dilated cardiomyopathy  -On Metoprolol    Hyperlipidemia  -On Fenofibrate  -avoidance refined carbohydrates, saturated fats  -likely to improve with dietary changes and weight loss    Chronic schizophreniform disorder (HCC)  -w/ hx autism  -on Clozaril and Clomipramine (weight positives)  -not a candidate for bariatric surgery  -can consider use of Metformin to counteract weight gain SE of psychiatric medications    Fatty liver  -elevated LFTs with evidence of US  -advise minimum of 10% TBW loss    Goals:    Food log (ie ) www myfitnesspal com,sparkpeople  com,loseit com,calorieking  com,etc    No sugary beverages  At least 64oz of water daily  Increase physical activity by 10 minutes daily  Gradually increase physical activity to a goal of 5 days per week for 30 minutes of MODERATE intensity PLUS 2 days per week of FULL BODY resistance training  4997-6187 calories per day  5-10 servings of fruits and vegetables per day      Follow up in approximately 4-6 weeks with Non-Surgical Physician/Advanced Practitioner      Diagnoses and all orders for this visit:    Morbid obesity ([place] [address] )    Hypertriglyceridemia  -     Ambulatory referral to Weight Management    BMI 40 0-44 9, adult ([place] [address] )  -     Ambulatory referral to Weight Management    Tachycardia    Hyperlipidemia, unspecified hyperlipidemia type    Chronic schizophreniform disorder [facility])          Subjective:   Chief Complaint   Patient presents with    Consult     [facility] consult - snap bang 5/8       Patient ID: Mimi Urbina  is a 26 y o  male with excess weight/obesity here to pursue weight managment  Mother present for visit today    Past Medical History:   Diagnosis Date    Autism     Dilated cardiomyopathy     Mononucleosis     Schizophrenia ([address] )     Tachycardia 12/10/2018       HPI:  Obesity/Excess Weight:  Severity: Severe  Onset:  Around age 13    Modifiers: Diet and Exercise, monitored portion sizes  Contributing factors: like sweets  Associated symptoms: increased perspiration, increased back pain    Goals:  200 lbs  Highest: 237 lbs    Hydration: water 10 bottles; powerade or gatorade zero, crystal light   Exercise: denies  Occupation: unemployed; 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  SLeep:  ~9 hours  ETOH: denies    Colonoscopy: N/A    The following portions of the patient's history were reviewed and updated as appropriate: allergies, current medications, past family history, past medical history, past social history, past surgical history and problem list     Review of Systems   Constitutional: Negative for chills and fever  HENT: Negative for sore throat  Respiratory: Negative for cough and shortness of breath  Cardiovascular: Negative for chest pain and palpitations  Gastrointestinal: Negative for abdominal pain, constipation, diarrhea, nausea and vomiting  +GERD   Genitourinary: Negative for dysuria  Musculoskeletal: Positive for back pain  Skin: Negative for rash  Neurological: Positive for headaches (occasional)  Negative for dizziness     Psychiatric/Behavioral:        Suffers with depression - seeing therapists regularly       Objective:    /74   Pulse 96   Temp 98 7 °F (37 1 °C)   Resp 18   Ht 5' 2 75" (1 594 m)   Wt 108 kg (237 lb)   BMI 42 32 kg/m²     Physical Exam  Vitals signs and nursing note reviewed  Constitutional:       General: He is not in acute distress  Appearance: He is not toxic-appearing  HENT:      Head: Normocephalic and atraumatic  Eyes:      General: No scleral icterus  Neck:      Musculoskeletal: Normal range of motion  Pulmonary:      Effort: Pulmonary effort is normal  No respiratory distress  Musculoskeletal: Normal range of motion  Skin:     Findings: No rash (noted on face)  Neurological:      Mental Status: He is alert and oriented to person, place, and time  Psychiatric:         Mood and Affect: Mood normal          Thought Content:  Thought content normal

## 2021-03-18 NOTE — PATIENT INSTRUCTIONS
Goals: Food log (ie ) www myfitnesspal com,sparkpeople  com,loseit com,calorieking  com,etc    No sugary beverages  At least 64oz of water daily  Increase physical activity by 10 minutes daily   Gradually increase physical activity to a goal of 5 days per week for 30 minutes of MODERATE intensity PLUS 2 days per week of FULL BODY resistance training  1308-9638 calories per day  5-10 servings of fruits and vegetables per day

## 2021-03-19 ENCOUNTER — IMMUNIZATIONS (OUTPATIENT)
Dept: FAMILY MEDICINE CLINIC | Facility: HOSPITAL | Age: 26
End: 2021-03-19

## 2021-03-19 DIAGNOSIS — Z23 ENCOUNTER FOR IMMUNIZATION: Primary | ICD-10-CM

## 2021-03-19 PROCEDURE — 91300 SARS-COV-2 / COVID-19 MRNA VACCINE (PFIZER-BIONTECH) 30 MCG: CPT

## 2021-03-19 PROCEDURE — 0001A SARS-COV-2 / COVID-19 MRNA VACCINE (PFIZER-BIONTECH) 30 MCG: CPT

## 2021-03-22 ENCOUNTER — CLINICAL SUPPORT (OUTPATIENT)
Dept: FAMILY MEDICINE CLINIC | Facility: CLINIC | Age: 26
End: 2021-03-22
Payer: COMMERCIAL

## 2021-03-22 DIAGNOSIS — Z11.1 ENCOUNTER FOR PPD TEST: ICD-10-CM

## 2021-03-22 PROBLEM — K76.0 FATTY LIVER: Status: ACTIVE | Noted: 2021-03-22

## 2021-03-22 PROCEDURE — 86580 TB INTRADERMAL TEST: CPT

## 2021-03-22 NOTE — ASSESSMENT & PLAN NOTE
-w/ hx autism  -on Clozaril and Clomipramine (weight positives)  -not a candidate for bariatric surgery  -can consider use of Metformin to counteract weight gain SE of psychiatric medications

## 2021-03-22 NOTE — ASSESSMENT & PLAN NOTE
-On Fenofibrate  -avoidance refined carbohydrates, saturated fats  -likely to improve with dietary changes and weight loss

## 2021-03-24 ENCOUNTER — CLINICAL SUPPORT (OUTPATIENT)
Dept: FAMILY MEDICINE CLINIC | Facility: CLINIC | Age: 26
End: 2021-03-24

## 2021-03-24 DIAGNOSIS — Z11.1 ENCOUNTER FOR PPD SKIN TEST READING: Primary | ICD-10-CM

## 2021-03-24 LAB
INDURATION: 0 MM
TB SKIN TEST: NEGATIVE

## 2021-04-19 DIAGNOSIS — K76.0 FATTY LIVER: ICD-10-CM

## 2021-04-19 DIAGNOSIS — E78.1 HYPERTRIGLYCERIDEMIA: Primary | ICD-10-CM

## 2021-04-19 NOTE — TELEPHONE ENCOUNTER
Last refill date: 3/17/21 #90 but now needs to use mail order    Last appointment: 12/4/20  Next appointment: 4/29/12    PCP is out of the office  Please advise

## 2021-04-19 NOTE — TELEPHONE ENCOUNTER
Mother called for a refill of Fenofibrate 120mg  [facility]        It was sent last month to [facility] in [place] but they no longer use that pharmacy

## 2021-04-21 RX ORDER — FENOFIBRATE 120 MG/1
120 TABLET ORAL DAILY
Qty: 90 TABLET | Refills: 1 | Status: SHIPPED | OUTPATIENT
Start: 2021-04-21 | End: 2021-06-04

## 2021-04-22 ENCOUNTER — IMMUNIZATIONS (OUTPATIENT)
Dept: FAMILY MEDICINE CLINIC | Facility: HOSPITAL | Age: 26
End: 2021-04-22

## 2021-04-22 DIAGNOSIS — Z23 ENCOUNTER FOR IMMUNIZATION: Primary | ICD-10-CM

## 2021-04-22 PROCEDURE — 0002A SARS-COV-2 / COVID-19 MRNA VACCINE (PFIZER-BIONTECH) 30 MCG: CPT

## 2021-04-22 PROCEDURE — 91300 SARS-COV-2 / COVID-19 MRNA VACCINE (PFIZER-BIONTECH) 30 MCG: CPT

## 2021-04-22 RX ORDER — FENOFIBRATE 54 MG/1
108 TABLET ORAL DAILY
Qty: 180 TABLET | Refills: 1 | Status: SHIPPED | OUTPATIENT
Start: 2021-04-22 | End: 2021-06-04

## 2021-04-22 NOTE — TELEPHONE ENCOUNTER
Mother, Fransico Baker called that Fenofibrate 120mg is not covered by insuance      She contacted with insurance company and the only thing that will be covered is Fenofibrate 54mg take 2 tablets daily  #180    [facility] Mail order

## 2021-04-29 ENCOUNTER — CLINICAL SUPPORT (OUTPATIENT)
Dept: FAMILY MEDICINE CLINIC | Facility: CLINIC | Age: 26
End: 2021-04-29

## 2021-04-29 DIAGNOSIS — U07.1 COVID-19: Primary | ICD-10-CM

## 2021-04-29 PROCEDURE — U0003 INFECTIOUS AGENT DETECTION BY NUCLEIC ACID (DNA OR RNA); SEVERE ACUTE RESPIRATORY SYNDROME CORONAVIRUS 2 (SARS-COV-2) (CORONAVIRUS DISEASE [COVID-19]), AMPLIFIED PROBE TECHNIQUE, MAKING USE OF HIGH THROUGHPUT TECHNOLOGIES AS DESCRIBED BY CMS-2020-01-R: HCPCS | Performed by: PHYSICIAN ASSISTANT

## 2021-04-29 PROCEDURE — U0005 INFEC AGEN DETEC AMPLI PROBE: HCPCS | Performed by: PHYSICIAN ASSISTANT

## 2021-04-30 LAB — SARS-COV-2 RNA RESP QL NAA+PROBE: NEGATIVE

## 2021-05-28 DIAGNOSIS — E03.9 HYPOTHYROIDISM, UNSPECIFIED TYPE: ICD-10-CM

## 2021-05-28 RX ORDER — LEVOTHYROXINE SODIUM 50 MCG
TABLET ORAL
Qty: 90 TABLET | Refills: 1 | Status: SHIPPED | OUTPATIENT
Start: 2021-05-28 | End: 2021-11-01 | Stop reason: SDUPTHER

## 2021-06-04 ENCOUNTER — TELEPHONE (OUTPATIENT)
Dept: FAMILY MEDICINE CLINIC | Facility: CLINIC | Age: 26
End: 2021-06-04

## 2021-06-04 DIAGNOSIS — E78.1 HYPERTRIGLYCERIDEMIA: ICD-10-CM

## 2021-06-04 RX ORDER — FENOFIBRATE 54 MG/1
54 TABLET ORAL DAILY
Qty: 180 TABLET | Refills: 1
Start: 2021-06-04 | End: 2021-08-26 | Stop reason: SDUPTHER

## 2021-06-04 NOTE — TELEPHONE ENCOUNTER
Fax [phone] [address] [employer]    Patient mother states she needs letter faxed to [address] stating the change in dosing as they go by whats on the medication bottle      Please advise   Thank you

## 2021-06-04 NOTE — TELEPHONE ENCOUNTER
Mother, Jhon Bliss called that GI doctor talked to her and that patients labs for liver function is really high  GI was asking to back down the Fenofibrate to 1 tablet of Fenofibrate 54mg daily  She stated that this was the only new medication/medication increase in the last few months

## 2021-06-04 NOTE — TELEPHONE ENCOUNTER
Have patient take 1 fenofibrate 54 mg  I reconciled med list  I did review GI note  If we need to complete discontinue in the future, we will

## 2021-07-06 ENCOUNTER — TELEMEDICINE (OUTPATIENT)
Dept: CARDIOLOGY CLINIC | Facility: CLINIC | Age: 26
End: 2021-07-06
Payer: COMMERCIAL

## 2021-07-06 VITALS
WEIGHT: 226.8 LBS | BODY MASS INDEX: 40.18 KG/M2 | DIASTOLIC BLOOD PRESSURE: 76 MMHG | HEIGHT: 63 IN | SYSTOLIC BLOOD PRESSURE: 108 MMHG | HEART RATE: 92 BPM

## 2021-07-06 DIAGNOSIS — I42.0 DILATED CARDIOMYOPATHY (HCC): Primary | ICD-10-CM

## 2021-07-06 DIAGNOSIS — R00.0 TACHYCARDIA: Chronic | ICD-10-CM

## 2021-07-06 PROCEDURE — 99442 PR PHYS/QHP TELEPHONE EVALUATION 11-20 MIN: CPT | Performed by: INTERNAL MEDICINE

## 2021-07-06 NOTE — PROGRESS NOTES
Virtual Brief Visit    Assessment/Plan:    Problem List Items Addressed This Visit        Cardiovascular and Mediastinum    Dilated cardiomyopathy ([address] ) - Primary     This had resolved with better pulse rate control  Continue beta-blocker dose  Relevant Orders    Echo complete with contrast if indicated       Other    Tachycardia (Chronic)     Better controlled  Follow up Plan: one year echo and follow-up visit  HPI:   Patient is seen today by telephone regarding the above  He is at a dormitory and the visit was conducted purely by telephone  In general he states he feels well  Pulse has been better controlled  He denies chest pain or shortness of breath  Most recent or relevant cardiac/vascular testing:       Echocardiogram 12/12/2018:  Mild left ventricular enlargement  Ejection fraction 45%  Echocardiogram 10/3/2019: Normal left ventricular size and function  Holter monitor 8/3/2019:  Heart rate varied between 60 and 138 beats per minute  Past Surgical History:   Procedure Laterality Date    DENTAL SURGERY      TONSILLECTOMY AND ADENOIDECTOMY      Last assessed 7/8/2014     CMP:   Lab Results   Component Value Date    K 3 8 11/07/2018     11/07/2018    CO2 27 11/07/2018    BUN 10 11/07/2018    CREATININE 0 72 11/07/2018    EGFR 132 11/07/2018       Lipid Profile: No results found for: CHOL, TRIG, HDL, LDL      Review of Systems   10  point ROS  was otherwise non pertinent or negative except as per HPI or as below  Gait: Normal     Objective:       PHYSICAL EXAM:    This was a virtual visit and no formal exam could be performed  Home vitals were: /76   Pulse 92   Ht 5' 3" (1 6 m)   Wt 103 kg (226 lb 12 8 oz)   BMI 40 18 kg/m²     Alert and oriented  No dyspnea        Current Outpatient Medications:     clomiPRAMINE (ANAFRANIL) 50 mg capsule, Take 2 capsules by mouth, Disp: , Rfl:     cloZAPine (CLOZARIL) 100 mg tablet, Take 2 tablets by mouth 2 (two) times a day , Disp: , Rfl:     cloZAPine (CLOZARIL) 25 mg tablet, Take 25 mg by mouth daily In the morning, Disp: , Rfl:     fenofibrate (TRICOR) 54 MG tablet, Take 1 tablet (54 mg total) by mouth daily, Disp: 180 tablet, Rfl: 1    metoprolol succinate (TOPROL-XL) 100 mg 24 hr tablet, Take 1 tablet (100 mg total) by mouth daily, Disp: 90 tablet, Rfl: 3    omeprazole (PriLOSEC) 20 mg delayed release capsule, Take by mouth daily, Disp: , Rfl: 0    Synthroid 50 MCG tablet, TAKE 1 TABLET DAILY, Disp: 90 tablet, Rfl: 1    fluticasone (FLONASE) 50 mcg/act nasal spray, instill 2 sprays into each nostril daily (Patient not taking: Reported on 7/6/2021), Disp: 32 g, Rfl: 1    loratadine (CLARITIN) 10 mg tablet, Take 1 tablet (10 mg total) by mouth daily (Patient not taking: Reported on 7/6/2021), Disp: 30 tablet, Rfl: 0    meclizine (ANTIVERT) 25 mg tablet, Take 1 tablet (25 mg total) by mouth every 12 (twelve) hours as needed for dizziness (Patient not taking: Reported on 7/6/2021), Disp: 30 tablet, Rfl: 0  Allergies   Allergen Reactions    Prednisone      DEPRESSION     Past Medical History:   Diagnosis Date    Autism     Dilated cardiomyopathy     Mononucleosis     Schizophrenia ([facility] )     Tachycardia 12/10/2018           Social History     Tobacco Use   Smoking Status Never Smoker   Smokeless Tobacco Never Used               Reason for visit is   Chief Complaint   Patient presents with    Rapid Heart Rate    Virtual Brief Visit        Encounter provider Chantel Benton MD    Provider located at [address] [address]    Recent Visits  No visits were found meeting these conditions    Showing recent visits within past 7 days and meeting all other requirements  Today's Visits  Date Type Provider Dept   07/06/21 Telemedicine Chantel Benton MD [address] [place] [address] Showing today's visits and meeting all other requirements  Future Appointments  No visits were found meeting these conditions  Showing future appointments within next 150 days and meeting all other requirements       After connecting through telephone, the patient was identified by name and date of birth  Yuli Quezada was informed that this is a telemedicine visit and that the visit is b  No one else was in the room  He acknowledged consent and understanding of privacy and security of the platform  The patient has agreed to participate and understands he can discontinue the visit at any time  Patient is aware this is a billable service  Subjective    Yuli Quezada is a 23 y o  male      HPI     Past Medical History:   Diagnosis Date    Autism     Dilated cardiomyopathy     Mononucleosis     [facility]     Tachycardia 12/10/2018       Past Surgical History:   Procedure Laterality Date    DENTAL SURGERY      TONSILLECTOMY AND ADENOIDECTOMY      Last assessed 7/8/2014       Current Outpatient Medications   Medication Sig Dispense Refill    clomiPRAMINE (ANAFRANIL) 50 mg capsule Take 2 capsules by mouth      cloZAPine (CLOZARIL) 100 mg tablet Take 2 tablets by mouth 2 (two) times a day       cloZAPine (CLOZARIL) 25 mg tablet Take 25 mg by mouth daily In the morning      fenofibrate (TRICOR) 54 MG tablet Take 1 tablet (54 mg total) by mouth daily 180 tablet 1    metoprolol succinate (TOPROL-XL) 100 mg 24 hr tablet Take 1 tablet (100 mg total) by mouth daily 90 tablet 3    omeprazole (PriLOSEC) 20 mg delayed release capsule Take by mouth daily  0    Synthroid 50 MCG tablet TAKE 1 TABLET DAILY 90 tablet 1    fluticasone (FLONASE) 50 mcg/act nasal spray instill 2 sprays into each nostril daily (Patient not taking: Reported on 7/6/2021) 32 g 1    loratadine (CLARITIN) 10 mg tablet Take 1 tablet (10 mg total) by mouth daily (Patient not taking: Reported on 7/6/2021) 30 tablet 0    meclizine (ANTIVERT) 25 mg tablet Take 1 tablet (25 mg total) by mouth every 12 (twelve) hours as needed for dizziness (Patient not taking: Reported on 7/6/2021) 30 tablet 0     No current facility-administered medications for this visit  Allergies   Allergen Reactions    Prednisone      DEPRESSION       Review of Systems    Vitals:    07/06/21 1531   BP: 108/76   Pulse: 92   Weight: 103 kg (226 lb 12 8 oz)   Height: 5' 3" (1 6 m)         I spent 20 minutes directly with the patient during this visit    VIRTUAL VISIT DISCLAIMER    Penny Sorensen acknowledges that he has consented to an online visit or consultation  He understands that the online visit is based solely on information provided by him, and that, in the absence of a face-to-face physical evaluation by the physician, the diagnosis he receives is both limited and provisional in terms of accuracy and completeness  This is not intended to replace a full medical face-to-face evaluation by the physician  Penny Sorensen understands and accepts these terms

## 2021-08-26 ENCOUNTER — TELEPHONE (OUTPATIENT)
Dept: FAMILY MEDICINE CLINIC | Facility: CLINIC | Age: 26
End: 2021-08-26

## 2021-08-26 DIAGNOSIS — E78.1 HYPERTRIGLYCERIDEMIA: ICD-10-CM

## 2021-08-26 RX ORDER — FENOFIBRATE 54 MG/1
54 TABLET ORAL DAILY
Qty: 30 TABLET | Refills: 0 | Status: SHIPPED | OUTPATIENT
Start: 2021-08-26 | End: 2021-10-14 | Stop reason: SDUPTHER

## 2021-08-26 NOTE — TELEPHONE ENCOUNTER
Message left asking for a 30 day supply of Fenofibrate to be sent to [facility]  Is having a problem with mail order and medication is needed for school

## 2021-08-26 NOTE — TELEPHONE ENCOUNTER
Requested medication(s) are due for refill today: Yes  Patient has already received a courtesy refill: No  Other reason request has been forwarded to provider:  Protocol failed

## 2021-08-27 NOTE — TELEPHONE ENCOUNTER
There are outstanding orders for lipid and cmp in chart to be completed next month, it will be important to continue to monitor liver tests

## 2021-08-27 NOTE — TELEPHONE ENCOUNTER
Mother/Darlene called stating that patient's gastro had patient do lab work 08/26/2021 and called to advise that patient's sugars are high and to stop the fenofibrate  Lab results to be sent to PCP with note to discontinue it  Patient will need a note for school (he returns this weekend) that the provider is discontinuing the medication at this time  Note can be sent through My Chart, per mother, and she can print it at home

## 2021-10-06 DIAGNOSIS — I42.0 DILATED CARDIOMYOPATHY (HCC): ICD-10-CM

## 2021-10-06 DIAGNOSIS — R00.0 TACHYCARDIA: ICD-10-CM

## 2021-10-07 RX ORDER — METOPROLOL SUCCINATE 100 MG/1
100 TABLET, EXTENDED RELEASE ORAL DAILY
Qty: 90 TABLET | Refills: 3 | Status: SHIPPED | OUTPATIENT
Start: 2021-10-07

## 2021-10-14 DIAGNOSIS — E78.1 HYPERTRIGLYCERIDEMIA: ICD-10-CM

## 2021-10-15 ENCOUNTER — TELEPHONE (OUTPATIENT)
Dept: FAMILY MEDICINE CLINIC | Facility: CLINIC | Age: 26
End: 2021-10-15

## 2021-10-15 RX ORDER — FENOFIBRATE 54 MG/1
54 TABLET ORAL DAILY
Qty: 90 TABLET | Refills: 1 | Status: SHIPPED | OUTPATIENT
Start: 2021-10-15 | End: 2022-05-25

## 2021-11-01 DIAGNOSIS — E03.9 HYPOTHYROIDISM, UNSPECIFIED TYPE: ICD-10-CM

## 2021-11-01 RX ORDER — LEVOTHYROXINE SODIUM 0.05 MG/1
50 TABLET ORAL DAILY
Qty: 90 TABLET | Refills: 1 | Status: SHIPPED | OUTPATIENT
Start: 2021-11-01 | End: 2021-12-21 | Stop reason: SDUPTHER

## 2021-11-26 ENCOUNTER — OFFICE VISIT (OUTPATIENT)
Dept: FAMILY MEDICINE CLINIC | Facility: CLINIC | Age: 26
End: 2021-11-26
Payer: COMMERCIAL

## 2021-11-26 VITALS
DIASTOLIC BLOOD PRESSURE: 76 MMHG | WEIGHT: 218 LBS | OXYGEN SATURATION: 98 % | BODY MASS INDEX: 37.22 KG/M2 | HEART RATE: 96 BPM | SYSTOLIC BLOOD PRESSURE: 102 MMHG | TEMPERATURE: 97.9 F | HEIGHT: 64 IN

## 2021-11-26 DIAGNOSIS — E78.1 ESSENTIAL HYPERTRIGLYCERIDEMIA: ICD-10-CM

## 2021-11-26 DIAGNOSIS — Z00.00 ANNUAL PHYSICAL EXAM: ICD-10-CM

## 2021-11-26 DIAGNOSIS — Z23 NEED FOR INFLUENZA VACCINATION: Primary | ICD-10-CM

## 2021-11-26 PROCEDURE — 90471 IMMUNIZATION ADMIN: CPT

## 2021-11-26 PROCEDURE — 3725F SCREEN DEPRESSION PERFORMED: CPT | Performed by: FAMILY MEDICINE

## 2021-11-26 PROCEDURE — 1036F TOBACCO NON-USER: CPT | Performed by: FAMILY MEDICINE

## 2021-11-26 PROCEDURE — 99214 OFFICE O/P EST MOD 30 MIN: CPT | Performed by: FAMILY MEDICINE

## 2021-11-26 PROCEDURE — 3008F BODY MASS INDEX DOCD: CPT | Performed by: FAMILY MEDICINE

## 2021-11-26 PROCEDURE — 90686 IIV4 VACC NO PRSV 0.5 ML IM: CPT

## 2021-11-26 RX ORDER — ERGOCALCIFEROL 1.25 MG/1
CAPSULE ORAL
COMMUNITY
Start: 2021-11-23 | End: 2022-05-25

## 2021-12-20 ENCOUNTER — IMMUNIZATIONS (OUTPATIENT)
Dept: FAMILY MEDICINE CLINIC | Facility: HOSPITAL | Age: 26
End: 2021-12-20

## 2021-12-20 DIAGNOSIS — Z23 ENCOUNTER FOR IMMUNIZATION: Primary | ICD-10-CM

## 2021-12-20 PROCEDURE — 91300 COVID-19 PFIZER VACC 0.3 ML: CPT

## 2021-12-20 PROCEDURE — 0001A COVID-19 PFIZER VACC 0.3 ML: CPT

## 2021-12-21 DIAGNOSIS — E03.9 HYPOTHYROIDISM, UNSPECIFIED TYPE: ICD-10-CM

## 2021-12-21 RX ORDER — LEVOTHYROXINE SODIUM 0.05 MG/1
50 TABLET ORAL DAILY
Qty: 90 TABLET | Refills: 1 | Status: SHIPPED | OUTPATIENT
Start: 2021-12-21 | End: 2022-07-06

## 2022-03-10 NOTE — TELEPHONE ENCOUNTER
Order pended as listed as historical  Unable to find pharmacy in system  Prescription will needed to printed if provider refills and faxed to pharmacy

## 2022-03-10 NOTE — TELEPHONE ENCOUNTER
Vit D 50,000 capsule      Pharmacy:   [facility]  [place], [place]    Please call Mother/Darlene if there are any problems with sending the script  She is aware she will have to pay for it at the pharmacy due to the inclement weather on Saturday

## 2022-03-10 NOTE — TELEPHONE ENCOUNTER
Found pharmacy listed, updated in chart  Patient's mother asking if she could be notified once prescription has been sent

## 2022-03-11 DIAGNOSIS — E55.9 VITAMIN D DEFICIENCY: Primary | ICD-10-CM

## 2022-03-11 RX ORDER — ERGOCALCIFEROL 1.25 MG/1
50000 CAPSULE ORAL
OUTPATIENT
Start: 2022-03-11

## 2022-03-11 NOTE — PROGRESS NOTES
Called pt's mom relayed message, mom stated that a letter and the lad slip need to be faxed to pt's school stating that the vit d is being discontinued until lad work is completed  Pt can not get over the counter vit D on his own due to rules of school, mom will get it to him next weekend since they are calling for bad weather this weekend  Letter was and lad slip were faxed to [facility] at [phone]

## 2022-03-11 NOTE — TELEPHONE ENCOUNTER
We would need to check Vit D lab before prescribing this  Lab in chart  It is recommended to take vit d 3 2000 IU daily otc

## 2022-03-15 ENCOUNTER — TELEPHONE (OUTPATIENT)
Dept: FAMILY MEDICINE CLINIC | Facility: CLINIC | Age: 27
End: 2022-03-15

## 2022-03-15 NOTE — TELEPHONE ENCOUNTER
Patient's mother called and wanted to get results of the Vitamin D lab wok that was completed  Wanting to know if he should remain on the 50,000units or OTC  Please advise

## 2022-03-16 NOTE — TELEPHONE ENCOUNTER
Mother has been notified and has requested a note be sent to the [address] at [place] [address] stating the 50,000 unit has been d/c and he is to continue on 2000 IU OTC daily    Letter written and faxrd to [phone]

## 2022-05-23 RX ORDER — HYDROXYZINE PAMOATE 25 MG/1
25 CAPSULE ORAL 4 TIMES DAILY PRN
COMMUNITY
Start: 2022-02-16 | End: 2023-02-09

## 2022-05-23 RX ORDER — CLOZAPINE 50 MG/1
50 TABLET ORAL EVERY EVENING
COMMUNITY
Start: 2022-04-29 | End: 2022-05-25

## 2022-05-23 RX ORDER — CLOTRIMAZOLE AND BETAMETHASONE DIPROPIONATE 10; .64 MG/G; MG/G
CREAM TOPICAL
COMMUNITY
Start: 2022-03-22

## 2022-05-23 RX ORDER — LUMATEPERONE 42 MG/1
42 CAPSULE ORAL DAILY
COMMUNITY
Start: 2022-05-23

## 2022-05-25 ENCOUNTER — OFFICE VISIT (OUTPATIENT)
Dept: FAMILY MEDICINE CLINIC | Facility: CLINIC | Age: 27
End: 2022-05-25
Payer: COMMERCIAL

## 2022-05-25 VITALS
TEMPERATURE: 97.5 F | HEIGHT: 63 IN | DIASTOLIC BLOOD PRESSURE: 72 MMHG | HEART RATE: 77 BPM | SYSTOLIC BLOOD PRESSURE: 110 MMHG | WEIGHT: 204 LBS | OXYGEN SATURATION: 96 % | BODY MASS INDEX: 36.14 KG/M2

## 2022-05-25 DIAGNOSIS — E55.9 VITAMIN D DEFICIENCY: ICD-10-CM

## 2022-05-25 DIAGNOSIS — R21 RASH, SKIN: ICD-10-CM

## 2022-05-25 DIAGNOSIS — E03.9 HYPOTHYROIDISM, UNSPECIFIED TYPE: Primary | ICD-10-CM

## 2022-05-25 DIAGNOSIS — R79.89 ELEVATED LFTS: ICD-10-CM

## 2022-05-25 DIAGNOSIS — E78.1 HYPERTRIGLYCERIDEMIA: ICD-10-CM

## 2022-05-25 DIAGNOSIS — F51.02 ADJUSTMENT INSOMNIA: ICD-10-CM

## 2022-05-25 PROCEDURE — 1036F TOBACCO NON-USER: CPT | Performed by: PHYSICIAN ASSISTANT

## 2022-05-25 PROCEDURE — 3008F BODY MASS INDEX DOCD: CPT | Performed by: PHYSICIAN ASSISTANT

## 2022-05-25 PROCEDURE — 99213 OFFICE O/P EST LOW 20 MIN: CPT | Performed by: PHYSICIAN ASSISTANT

## 2022-05-25 RX ORDER — CHOLECALCIFEROL (VITAMIN D3) 50 MCG
2000 TABLET ORAL DAILY
COMMUNITY

## 2022-05-25 RX ORDER — MELATONIN 10 MG
10 CAPSULE ORAL
COMMUNITY

## 2022-05-25 NOTE — PROGRESS NOTES
Routine Follow-up    Maria Elena Courser 23 y o  male   Date:  5/25/2022      Assessment and Plan:    Hilary Franco was seen today for follow-up, fatigue, rash and insomnia  Diagnoses and all orders for this visit:    Hypothyroidism, unspecified type  Comments:  continue synthroid, due for labs   Orders:  -     TSH, 3rd generation with Free T4 reflex; Future    Hypertriglyceridemia  Comments:  low fat diet, fenofibrate held previosly due to LFTs   Orders:  -     Lipid panel; Future    Elevated LFTs  Comments:  continue to monitor on routine labs  Orders:  -     Comprehensive metabolic panel; Future    Adjustment insomnia  Comments:  due to psych medication adjustments, agree that melatonin is safe to take prn    Rash, skin  Comments:  of axilla, currenlty not present; he recalls being given lotrisone previously, okay to use bid prn    Vitamin D deficiency  Comments:  previous labs wnl, can continue 2000 IU daily alone           HPI:  Chief Complaint   Patient presents with    Follow-up    Fatigue     Did have a change in mental health issues     Rash     Under armpits     Insomnia     Believes side effect from medication, did get okay for melatonin      HPI   Patient is a 22 yo male who presents with mom for routine follow up  He recently just came home for summer  He was taken off clozapine last week, was on for about 10 years  He was started on Caplyta last week for schizophrenia instead  It has been making him feel tired and not sleeping well yet through this adjustment  He was recommended by psychiatry to start melatonin and wonders if this is okay  He has been hot and cold  No fevers  He is due for some routine labs  His arm pits get red and itchy at times  He has previously script for lotrisone cream with relief  He has been off fenofibtrate due to previous LFT elevation  He has dental appt set up for this summer and eye appt in July      ROS: Review of Systems   Constitutional: Positive for chills (hot and cold since weaning and changing psych mdications) and fatigue  Negative for fever  Respiratory: Negative  Cardiovascular: Negative  Gastrointestinal: Negative  Genitourinary: Negative  Skin: Positive for rash (not present at this time)  Psychiatric/Behavioral: Positive for sleep disturbance         Past Medical History:   Diagnosis Date    Autism     Dilated cardiomyopathy     Mononucleosis     Schizophrenia ([facility] )     Tachycardia 12/10/2018     Patient Active Problem List   Diagnosis    Abnormal EKG    Autistic disorder, active    Depression    Eczema    Essential hypertriglyceridemia    Hyperlipidemia    Left-sided Bell's palsy    Morbid obesity with BMI of 40 0-44 9, adult ([facility] )    PDD (pervasive developmental disorder)    Plantar warts    Chronic schizophreniform disorder (HCC)    DARREN (obstructive sleep apnea)    Vitamin D deficiency    Lactic acidosis    Tachycardia    Periumbilical abdominal pain    Hypophosphatemia    Hypomagnesemia    Hypokalemia    Dilated cardiomyopathy ([facility] )    Encounter for examination to participate in special olympics    Palpitations    Snoring    Fatty liver       Past Surgical History:   Procedure Laterality Date    DENTAL SURGERY      TONSILLECTOMY AND ADENOIDECTOMY      Last assessed 7/8/2014       Social History     Socioeconomic History    Marital status: Single     Spouse name: None    Number of children: None    Years of education: None    Highest education level: None   Occupational History    None   Tobacco Use    Smoking status: Never Smoker    Smokeless tobacco: Never Used   Vaping Use    Vaping Use: Never used   Substance and Sexual Activity    Alcohol use: No    Drug use: No    Sexual activity: Never   Other Topics Concern    None   Social History Narrative    Uses seatbelts     Social Determinants of Health     Financial Resource Strain: Not on file   Food Insecurity: Not on file   Transportation Needs: Not on file   Physical Activity: Not on file   Stress: Not on file   Social Connections: Not on file   Intimate Partner Violence: Not on file   Housing Stability: Not on file       Family History   Problem Relation Age of Onset    Hypothyroidism Father     Cancer Family     Diabetes Family     Hyperlipidemia Family     Stroke Family     Obesity Mother     Hypothyroidism Mother     Heart disease Maternal Grandfather     Hypothyroidism Brother        Allergies   Allergen Reactions    Prednisone      DEPRESSION         Current Outpatient Medications:     Caplyta 42 MG CAPS capsule, Take 42 mg by mouth daily, Disp: , Rfl:     Cholecalciferol (Vitamin D) 50 MCG (2000 UT) tablet, Take 2,000 Units by mouth daily, Disp: , Rfl:     clomiPRAMINE (ANAFRANIL) 50 mg capsule, Take 2 capsules by mouth daily at bedtime, Disp: , Rfl:     clotrimazole-betamethasone (LOTRISONE) 1-0 05 % cream, , Disp: , Rfl:     fluticasone (FLONASE) 50 mcg/act nasal spray, instill 2 sprays into each nostril daily, Disp: 32 g, Rfl: 1    hydrOXYzine pamoate (VISTARIL) 25 mg capsule, Take 25 mg by mouth 4 (four) times a day as needed, Disp: , Rfl:     levothyroxine (Synthroid) 50 mcg tablet, Take 1 tablet (50 mcg total) by mouth daily, Disp: 90 tablet, Rfl: 1    Melatonin 10 MG CAPS, Take 10 mg by mouth daily at bedtime as needed (insomnia), Disp: , Rfl:     metoprolol succinate (TOPROL-XL) 100 mg 24 hr tablet, Take 1 tablet (100 mg total) by mouth daily, Disp: 90 tablet, Rfl: 3    omeprazole (PriLOSEC) 20 mg delayed release capsule, Take 20 mg by mouth Monday, Wednesday, Friday, Disp: , Rfl: 0      Physical Exam:  /72 (BP Location: Left arm, Patient Position: Sitting)   Pulse 77   Temp 97 5 °F (36 4 °C)   Ht 5' 3" (1 6 m)   Wt 92 5 kg (204 lb)   SpO2 96%   BMI 36 14 kg/m²     Physical Exam  Constitutional:       General: He is not in acute distress  Appearance: He is not toxic-appearing        Comments: Tired appearing HENT:      Head: Normocephalic and atraumatic  Right Ear: Tympanic membrane, ear canal and external ear normal       Left Ear: Tympanic membrane, ear canal and external ear normal       Nose: Nose normal       Mouth/Throat:      Mouth: Mucous membranes are moist       Pharynx: Oropharynx is clear  Eyes:      Conjunctiva/sclera: Conjunctivae normal       Pupils: Pupils are equal, round, and reactive to light  Cardiovascular:      Rate and Rhythm: Normal rate and regular rhythm  Heart sounds: No murmur heard  Pulmonary:      Effort: Pulmonary effort is normal  No respiratory distress  Breath sounds: Normal breath sounds  No wheezing  Musculoskeletal:         General: No deformity or signs of injury  Cervical back: Normal range of motion and neck supple  Skin:     General: Skin is warm and dry  Coloration: Skin is not pale  Neurological:      General: No focal deficit present  Mental Status: He is alert and oriented to person, place, and time  Gait: Gait normal    Psychiatric:         Mood and Affect: Affect is flat           Speech: Speech normal          Behavior: Behavior normal            Labs:  Lab Results   Component Value Date    WBC 6 40 12/05/2020    HGB 15 4 12/05/2020    HCT 44 9 12/05/2020    MCV 90 12/05/2020     12/05/2020     Lab Results   Component Value Date    K 3 8 11/07/2018     11/07/2018    CO2 27 11/07/2018    BUN 10 11/07/2018    CREATININE 0 72 11/07/2018    GLUF 83 11/07/2018    CALCIUM 9 2 11/07/2018    AST 27 11/07/2018    ALT 45 11/07/2018    ALKPHOS 55 11/07/2018    EGFR 132 11/07/2018

## 2022-06-20 ENCOUNTER — TELEPHONE (OUTPATIENT)
Dept: FAMILY MEDICINE CLINIC | Facility: CLINIC | Age: 27
End: 2022-06-20

## 2022-06-20 ENCOUNTER — HOSPITAL ENCOUNTER (EMERGENCY)
Facility: HOSPITAL | Age: 27
Discharge: HOME | End: 2022-06-20
Attending: FAMILY MEDICINE
Payer: COMMERCIAL

## 2022-06-20 VITALS
DIASTOLIC BLOOD PRESSURE: 93 MMHG | HEIGHT: 64 IN | BODY MASS INDEX: 35.51 KG/M2 | WEIGHT: 208 LBS | RESPIRATION RATE: 18 BRPM | TEMPERATURE: 98 F | SYSTOLIC BLOOD PRESSURE: 111 MMHG | OXYGEN SATURATION: 98 % | HEART RATE: 90 BPM

## 2022-06-20 DIAGNOSIS — F41.9 ANXIETY: ICD-10-CM

## 2022-06-20 DIAGNOSIS — T50.905A ADVERSE EFFECT OF DRUG, INITIAL ENCOUNTER: Primary | ICD-10-CM

## 2022-06-20 PROCEDURE — 99284 EMERGENCY DEPT VISIT MOD MDM: CPT | Performed by: FAMILY MEDICINE

## 2022-06-20 PROCEDURE — 99283 EMERGENCY DEPT VISIT LOW MDM: CPT

## 2022-06-20 NOTE — TELEPHONE ENCOUNTER
Patient: Matthew Carpenter Date of Service: 3/10/2022   : 1954 MRN: [id number]     SUBJECTIVE:     HISTORY OF PRESENT ILLNESS:  Matthew Carpenter is a 68 year old male who presents today for follow up     Lost his brother  He had heart attack and passed away   Older brother has dementia and is getting sicker    Whole house caught covid, he did not get it-     Has been stressed out     Working on going to the dentist    He is feeling well overall       PAST MEDICAL HISTORY:  Past Medical History:   Diagnosis Date   • Diabetes mellitus (CMS/HCC)    • Essential (primary) hypertension    • High cholesterol    • Thyroid condition        MEDICATIONS:  Current Outpatient Medications   Medication Sig   • HumaLOG KwikPen 100 UNIT/ML pen-injector ADMINISTER 20 UNITS UNDER THE SKIN FOUR TIMES DAILY BEFORE MEALS AND AT NIGHT   • Insulin Pen Needle (BD Pen Needle Jessica 2nd Gen) 32G X 4 MM Misc Use to inject insulin 4 times daily. Remove needle cover(s) to expose needle before injecting.   • sildenafil (REVATIO) 20 MG tablet Take 2-3 tablets by mouth 1 hour before needed.   • atorvastatin (LIPITOR) 40 MG tablet Take 1 tablet by mouth daily.   • metformin (GLUCOPHAGE) 1000 MG tablet Take 1 tablet by mouth every 12 hours.   • omeprazole (PrilOSEC) 20 MG capsule Take 1 capsule by mouth daily.   • valsartan (DIOVAN) 160 MG tablet Take 1 tablet by mouth daily.   • FreeStyle Horacio 2 Sensor Misc 1 Device every 14 days.   • Continuous Blood Gluc  (FreeStyle Horacio 2 [place]) Device 1 Device continuous.   • fenofibrate (TRICOR) 54 MG tablet TAKE 1 TABLET BY MOUTH EVERY DAY   • levothyroxine 125 MCG tablet TAKE 1 TABLET BY MOUTH EVERY DAY   • ondansetron (ZOFRAN ODT) 4 MG disintegrating tablet Place 1 tablet onto the tongue every 6 hours as needed for Nausea.   • NovoLOG FlexPen 100 UNIT/ML pen-injector INJECT 20 UNITS INTO THE SKIN 4X DAILY (BEFORE MEALS AND NIGHTLY). PRIME 2 UNITS BEFORE EACH DOSE.   • hydroCORTisone (CORTIZONE) 1 %  Patients mother Fortunato Xavier left voicemail with the office asking if we can review the lab results that patient had completed with [facility] on 06/17  Please advise  cream Apply topically 2 times daily. To hands and feet.   • aspirin (ECOTRIN) 81 MG EC tablet Take 81 mg by mouth daily.     No current facility-administered medications for this visit.       ALLERGIES:  ALLERGIES:   Allergen Reactions   • Liraglutide Other (See Comments)     Unknown       PAST SURGICAL HISTORY:  History reviewed. No pertinent surgical history.    FAMILY HISTORY:  History reviewed. No pertinent family history.    SOCIAL HISTORY:  Social History     Tobacco Use   • Smoking status: Former Smoker     Packs/day: 0.00   • Smokeless tobacco: Never Used   Substance Use Topics   • Alcohol use: No   • Drug use: No       Review of Systems   Respiratory: Negative.    Cardiovascular: Negative.    Gastrointestinal: Negative.    Genitourinary: Negative.          OBJECTIVE:     Physical Exam  Vitals and nursing note reviewed.   Constitutional:       Appearance: Normal appearance.   HENT:      Head: Normocephalic and atraumatic.   Cardiovascular:      Rate and Rhythm: Normal rate and regular rhythm.      Pulses: Normal pulses.   Pulmonary:      Effort: Pulmonary effort is normal.      Breath sounds: No wheezing.   Musculoskeletal:      Right lower leg: No edema.      Left lower leg: No edema.   Lymphadenopathy:      Cervical: No cervical adenopathy.   Skin:     General: Skin is warm.   Neurological:      General: No focal deficit present.      Mental Status: He is alert and oriented to person, place, and time.   Psychiatric:         Mood and Affect: Mood normal.         Behavior: Behavior normal.         Thought Content: Thought content normal.         Visit Vitals  /72   Pulse 85   Temp 97.6 °F (36.4 °C)   Ht 5' 11.5\" (1.816 m)   Wt 113.4 kg (250 lb)   SpO2 98%   BMI 34.38 kg/m²         Wt Readings from Last 1 Encounters:   03/10/22 113.4 kg (250 lb)          Assessment AND PLAN:     This is a 68 year old year-old male who presents with     Diagnoses and all orders for this visit:  Acquired hypothyroidism  -      THYROID STIMULATING HORMONE REFLEX  Type 2 diabetes mellitus with stage 3a chronic kidney disease, with long-term current use of insulin (CMS/HCC)  -     GLYCOHEMOGLOBIN  -     MICROALBUMIN URINE RANDOM  -     CBC WITH DIFFERENTIAL  -     COMPREHENSIVE METABOLIC PANEL  Hypertriglyceridemia  -     LIPID PANEL WITHOUT REFLEX  Need for pneumococcal vaccination  -     PNEUMOCOCCAL POLYSACCHARIDE ADULT VACC, IM/SQ (PNEUMOVAX 23)  healthy diet  Keep moving/walking- should get easier with weather  Drink enough water  Veggies/protein  Cont same regimen   Cont insulin  Check blood work   pneumonia shot today  Eye exam due this summer    No follow-ups on file.    The patient indicated understanding of the diagnosis and agreed with the plan of care.      Ashley Coto, DO  3/10/2022

## 2022-06-20 NOTE — ED PROVIDER NOTES
History  Chief Complaint   Patient presents with    Medical Problem    Medication Problem     Patient was recently started on Caplyta and Trazodone  Patient feels it causes him to become angry  Denies SI      HPI  This is a 61-year-old male with history of autism presented to ED with his mother for possible adjustment with his medication  Mother states that he was recently started on Caplyta and trazodone  She states that she he feels that he has been more aggressive and agitated and has been sleeping  Mother states she called the [facility] who recommended to come to the ED  Patient denies any suicidal homicidal ideation  Denies any chest pain shortness of breath  Sitting comfortably in bed answer questions appropriately  Awake alert oriented at baseline  No agitation noted  Patient is extremely common cooperative  Prior to Admission Medications   Prescriptions Last Dose Informant Patient Reported? Taking?    Caplyta 42 MG CAPS capsule   Yes No   Sig: Take 42 mg by mouth daily   Cholecalciferol (Vitamin D) 50 MCG (2000 UT) tablet   Yes No   Sig: Take 2,000 Units by mouth daily   Melatonin 10 MG CAPS   Yes No   Sig: Take 10 mg by mouth daily at bedtime as needed (insomnia)   clomiPRAMINE (ANAFRANIL) 50 mg capsule  Self Yes No   Sig: Take 2 capsules by mouth daily at bedtime   clotrimazole-betamethasone (LOTRISONE) 1-0 05 % cream   Yes No   fluticasone (FLONASE) 50 mcg/act nasal spray  Self No No   Sig: instill 2 sprays into each nostril daily   hydrOXYzine pamoate (VISTARIL) 25 mg capsule   Yes No   Sig: Take 25 mg by mouth 4 (four) times a day as needed   levothyroxine (Synthroid) 50 mcg tablet   No No   Sig: Take 1 tablet (50 mcg total) by mouth daily   metoprolol succinate (TOPROL-XL) 100 mg 24 hr tablet   No No   Sig: Take 1 tablet (100 mg total) by mouth daily   omeprazole (PriLOSEC) 20 mg delayed release capsule  Self Yes No   Sig: Take 20 mg by mouth Monday, Wednesday, Friday Facility-Administered Medications: None       Past Medical History:   Diagnosis Date    Autism     Dilated cardiomyopathy     Mononucleosis     Schizophrenia ([facility] [address] [facility]     Tachycardia 12/10/2018       Past Surgical History:   Procedure Laterality Date    DENTAL SURGERY      TONSILLECTOMY AND ADENOIDECTOMY      Last assessed 7/8/2014       Family History   Problem Relation Age of Onset    Hypothyroidism Father     Cancer Family     Diabetes Family     Hyperlipidemia Family     Stroke Family     Obesity Mother     Hypothyroidism Mother     Heart disease Maternal Grandfather     Hypothyroidism Brother      I have reviewed and agree with the history as documented  E-Cigarette/Vaping    E-Cigarette Use Never User      E-Cigarette/Vaping Substances    Nicotine No     THC No     CBD No     Flavoring No     Other No     Unknown No      Social History     Tobacco Use    Smoking status: Never Smoker    Smokeless tobacco: Never Used   Vaping Use    Vaping Use: Never used   Substance Use Topics    Alcohol use: No    Drug use: No       Review of Systems   Constitutional: Negative for chills and fever  HENT: Negative for rhinorrhea and sore throat  Eyes: Negative for visual disturbance  Respiratory: Negative for cough and shortness of breath  Cardiovascular: Negative for chest pain and leg swelling  Gastrointestinal: Negative for abdominal pain, diarrhea, nausea and vomiting  Genitourinary: Negative for dysuria  Musculoskeletal: Negative for back pain and myalgias  Skin: Negative for rash  Neurological: Negative for dizziness and headaches  Psychiatric/Behavioral: Negative for confusion  The patient is not nervous/anxious  All other systems reviewed and are negative  Physical Exam  Physical Exam  Vitals and nursing note reviewed  Constitutional:       General: He is not in acute distress  Appearance: He is well-developed  He is not diaphoretic     HENT:      Head: Normocephalic and atraumatic  Right Ear: External ear normal       Left Ear: External ear normal       Nose: Nose normal       Mouth/Throat:      Mouth: Mucous membranes are moist       Pharynx: Oropharynx is clear  No posterior oropharyngeal erythema  Eyes:      Conjunctiva/sclera: Conjunctivae normal       Pupils: Pupils are equal, round, and reactive to light  Cardiovascular:      Rate and Rhythm: Normal rate and regular rhythm  Pulses: Normal pulses  Heart sounds: Normal heart sounds  Pulmonary:      Effort: Pulmonary effort is normal  No respiratory distress  Breath sounds: Normal breath sounds  No wheezing  Abdominal:      General: Bowel sounds are normal  There is no distension  Palpations: Abdomen is soft  Tenderness: There is no abdominal tenderness  Musculoskeletal:         General: Normal range of motion  Cervical back: Normal range of motion and neck supple  Lymphadenopathy:      Cervical: No cervical adenopathy  Skin:     General: Skin is warm and dry  Capillary Refill: Capillary refill takes less than 2 seconds  Neurological:      Mental Status: He is alert and oriented to person, place, and time     Psychiatric:         Mood and Affect: Mood normal          Behavior: Behavior normal          Vital Signs  ED Triage Vitals [06/20/22 0833]   Temperature Pulse Respirations Blood Pressure SpO2   98 °F (36 7 °C) 90 18 111/93 98 %      Temp Source Heart Rate Source Patient Position - Orthostatic VS BP Location FiO2 (%)   Oral Monitor Sitting Right arm --      Pain Score       No Pain           Vitals:    06/20/22 0833   BP: 111/93   Pulse: 90   Patient Position - Orthostatic VS: Sitting         Visual Acuity      ED Medications  Medications - No data to display    Diagnostic Studies  Results Reviewed     None                 No orders to display              Procedures  Procedures         ED Course  ED Course as of 06/20/22 1009   Mon Jun 20, 2022   8875 Case discussed with crisis worker list the who called it was clinic  Patient has an appointment at 1:15 a m  p m  tomorrow  According to [facility] this clinic this will be the 4th time adjusting his medication  States that mother seems to have trouble and is not happy with his medication adjustment  They do not recommend changing the regimen at this time recommended to continue with medication and follow-up in clinic tomorrow  200 Discussed with mother who agrees with the plan  She states that he is more calm and cooperative now and thinks that he will be okay today recommending follow-up with the [facility] clinic tomorrow  SBIRT 20yo+    Flowsheet Row Most Recent Value   SBIRT (23 yo +)    In order to provide better care to our patients, we are screening all of our patients for alcohol and drug use  Would it be okay to ask you these screening questions? Yes Filed at: 06/20/2022 8507   Initial Alcohol Screen: US AUDIT-C     1  How often do you have a drink containing alcohol? 0 Filed at: 06/20/2022 0954   2  How many drinks containing alcohol do you have on a typical day you are drinking? 0 Filed at: 06/20/2022 0954   3a  Male UNDER 65: How often do you have five or more drinks on one occasion? 0 Filed at: 06/20/2022 0954   Audit-C Score 0 Filed at: 06/20/2022 8191   NII: How many times in the past year have you    Used an illegal drug or used a prescription medication for non-medical reasons? Never Filed at: 06/20/2022 8241                    MDM    Disposition  Final diagnoses:    Adverse effect of drug, initial encounter   Anxiety     Time reflects when diagnosis was documented in both MDM as applicable and the Disposition within this note     Time User Action Codes Description Comment    6/20/2022 10:03 AM Ulysses Rodriguez Add [T50 905A] Adverse effect of drug, initial encounter     6/20/2022 10:03 AM Ulysses Rodriguez Add [F41 9] Anxiety       ED Disposition     ED Disposition Discharge    Condition   Stable    Date/Time   Mon Jun 20, 2022 10:03 AM    Comment   [place] discharge to home/self care  Follow-up Information     Follow up With Specialties Details Why Contact Frankie Mcrae PA-C Family Medicine, Physician Assistant Go on 6/21/2022 @1:15pm @ [facility] [place] [address]  [phone]            Discharge Medication List as of 6/20/2022 10:04 AM      CONTINUE these medications which have NOT CHANGED    Details   Caplyta 42 MG CAPS capsule Take 42 mg by mouth daily, Starting Mon 5/23/2022, Historical Med      Cholecalciferol (Vitamin D) 50 MCG (2000 UT) tablet Take 2,000 Units by mouth daily, Historical Med      clomiPRAMINE (ANAFRANIL) 50 mg capsule Take 2 capsules by mouth daily at bedtime, Historical Med      clotrimazole-betamethasone (LOTRISONE) 1-0 05 % cream Starting Tue 3/22/2022, Historical Med      fluticasone (FLONASE) 50 mcg/act nasal spray instill 2 sprays into each nostril daily, Normal      hydrOXYzine pamoate (VISTARIL) 25 mg capsule Take 25 mg by mouth 4 (four) times a day as needed, Starting Wed 2/16/2022, Historical Med      levothyroxine (Synthroid) 50 mcg tablet Take 1 tablet (50 mcg total) by mouth daily, Starting Tue 12/21/2021, Normal      Melatonin 10 MG CAPS Take 10 mg by mouth daily at bedtime as needed (insomnia), Historical Med      metoprolol succinate (TOPROL-XL) 100 mg 24 hr tablet Take 1 tablet (100 mg total) by mouth daily, Starting Thu 10/7/2021, Normal      omeprazole (PriLOSEC) 20 mg delayed release capsule Take 20 mg by mouth Monday, Wednesday, Friday, Starting Thu 4/18/2019, Historical Med             No discharge procedures on file      PDMP Review     None          ED Provider  Electronically Signed by           Garo Feliz MD  06/20/22 6894

## 2022-06-20 NOTE — ED NOTES
Call placed to [facility] at the request of the ER MD  appt scheduled on behalf of the patient for 1:15p on 6/21/22 with Azam Poor  Mother and patient aware of appt time  Provider will call mothers phone at 1:15 for appt

## 2022-06-21 NOTE — TELEPHONE ENCOUNTER
All labs stable - normal thyroid function, cholesterol well controlled, electrolyte panel normal, labs ordered by GI stable

## 2022-06-25 NOTE — ASSESSMENT & PLAN NOTE
This had resolved with better pulse rate control  Continue beta-blocker dose  [FreeTextEntry1] : unfortunately only the A1C was drawn at home on 6/23, labs were ordered today to further assess his health, see orders for details, no medication changes were made, his wife declined starting medication for the memory loss or seeing a neurologist

## 2022-07-06 DIAGNOSIS — E03.9 HYPOTHYROIDISM, UNSPECIFIED TYPE: ICD-10-CM

## 2022-07-06 RX ORDER — LEVOTHYROXINE SODIUM 50 MCG
TABLET ORAL
Qty: 90 TABLET | Refills: 1 | Status: SHIPPED | OUTPATIENT
Start: 2022-07-06

## 2022-07-18 ENCOUNTER — TELEPHONE (OUTPATIENT)
Dept: PSYCHIATRY | Facility: CLINIC | Age: 27
End: 2022-07-18

## 2022-07-18 ENCOUNTER — TELEPHONE (OUTPATIENT)
Dept: FAMILY MEDICINE CLINIC | Facility: CLINIC | Age: 27
End: 2022-07-18

## 2022-07-18 ENCOUNTER — HOSPITAL ENCOUNTER (OUTPATIENT)
Dept: NON INVASIVE DIAGNOSTICS | Facility: CLINIC | Age: 27
Discharge: HOME | End: 2022-07-18
Payer: COMMERCIAL

## 2022-07-18 VITALS
HEART RATE: 99 BPM | HEIGHT: 64 IN | BODY MASS INDEX: 35.51 KG/M2 | SYSTOLIC BLOOD PRESSURE: 111 MMHG | WEIGHT: 208 LBS | DIASTOLIC BLOOD PRESSURE: 93 MMHG

## 2022-07-18 DIAGNOSIS — F20.81: ICD-10-CM

## 2022-07-18 DIAGNOSIS — F51.02 ADJUSTMENT INSOMNIA: Primary | ICD-10-CM

## 2022-07-18 DIAGNOSIS — I42.0 DILATED CARDIOMYOPATHY (HCC): ICD-10-CM

## 2022-07-18 LAB
AORTIC ROOT: 2.6 CM
AORTIC VALVE MEAN VELOCITY: 8.2 M/S
APICAL FOUR CHAMBER EJECTION FRACTION: 52 %
ASCENDING AORTA: 2.4 CM
AV AREA BY CONTINUOUS VTI: 2.3 CM2
AV AREA PEAK VELOCITY: 2.3 CM2
AV LVOT MEAN GRADIENT: 1 MMHG
AV LVOT PEAK GRADIENT: 3 MMHG
AV MEAN GRADIENT: 3 MMHG
AV PEAK GRADIENT: 6 MMHG
AV VALVE AREA: 2.33 CM2
AV VELOCITY RATIO: 0.73
DOP CALC AO PEAK VEL: 1.2 M/S
DOP CALC AO VTI: 20.83 CM
DOP CALC LVOT AREA: 3.14 CM2
DOP CALC LVOT DIAMETER: 2 CM
DOP CALC LVOT PEAK VEL VTI: 15.47 CM
DOP CALC LVOT PEAK VEL: 0.88 M/S
DOP CALC LVOT STROKE INDEX: 24.6 ML/M2
DOP CALC LVOT STROKE VOLUME: 48.58
E WAVE DECELERATION TIME: 173 MS
FRACTIONAL SHORTENING: 28 (ref 28–44)
INTERVENTRICULAR SEPTUM IN DIASTOLE (PARASTERNAL SHORT AXIS VIEW): 1 CM
INTERVENTRICULAR SEPTUM: 1 CM (ref 0.6–1.1)
LAAS-AP2: 9.1 CM2
LAAS-AP4: 9 CM2
LEFT ATRIUM AREA SYSTOLE SINGLE PLANE A4C: 8.6 CM2
LEFT ATRIUM SIZE: 2.9 CM
LEFT INTERNAL DIMENSION IN SYSTOLE: 2.6 CM (ref 2.1–4)
LEFT VENTRICULAR INTERNAL DIMENSION IN DIASTOLE: 3.6 CM (ref 3.5–6)
LEFT VENTRICULAR POSTERIOR WALL IN END DIASTOLE: 1 CM
LEFT VENTRICULAR STROKE VOLUME: 30 ML
LVSV (TEICH): 30 ML
MV E'TISSUE VEL-SEP: 12 CM/S
MV PEAK A VEL: 0.6 M/S
MV PEAK E VEL: 73 CM/S
MV STENOSIS PRESSURE HALF TIME: 50 MS
MV VALVE AREA P 1/2 METHOD: 4.4
RIGHT ATRIUM AREA SYSTOLE A4C: 9.6 CM2
RIGHT VENTRICLE ID DIMENSION: 3.2 CM
SL CV LEFT ATRIUM LENGTH A2C: 3.7 CM
SL CV LV EF: 50
SL CV PED ECHO LEFT VENTRICLE DIASTOLIC VOLUME (MOD BIPLANE) 2D: 54 ML
SL CV PED ECHO LEFT VENTRICLE SYSTOLIC VOLUME (MOD BIPLANE) 2D: 25 ML
TR MAX PG: 18 MMHG
TR PEAK VELOCITY: 2.2 M/S
TRICUSPID VALVE PEAK REGURGITATION VELOCITY: 2.15 M/S

## 2022-07-18 PROCEDURE — 93306 TTE W/DOPPLER COMPLETE: CPT | Performed by: INTERNAL MEDICINE

## 2022-07-18 PROCEDURE — 93306 TTE W/DOPPLER COMPLETE: CPT

## 2022-07-18 NOTE — TELEPHONE ENCOUNTER
Patient's mother left a message on the voicemail requesting if a referral for mental health could be placed into epic  She had spoken with the [place] office and they advised if a referral was placed into epic patient could be placed on wait list  Please advise

## 2022-07-18 NOTE — TELEPHONE ENCOUNTER
Pt was added to the wait list for med mgmt and talk therapy  No referral on file   Sending outside resources

## 2022-07-21 NOTE — TELEPHONE ENCOUNTER
Patient gave verbal consent to speak to mom  Mom called stating patient referral was sent via epic   Confirmed with mom we have received referral

## 2022-08-25 ENCOUNTER — OFFICE VISIT (OUTPATIENT)
Dept: CARDIOLOGY CLINIC | Facility: CLINIC | Age: 27
End: 2022-08-25
Payer: COMMERCIAL

## 2022-08-25 VITALS
DIASTOLIC BLOOD PRESSURE: 68 MMHG | BODY MASS INDEX: 35.68 KG/M2 | HEIGHT: 64 IN | HEART RATE: 104 BPM | WEIGHT: 209 LBS | SYSTOLIC BLOOD PRESSURE: 108 MMHG

## 2022-08-25 DIAGNOSIS — R00.0 TACHYCARDIA: Chronic | ICD-10-CM

## 2022-08-25 DIAGNOSIS — I42.0 DILATED CARDIOMYOPATHY (HCC): Primary | ICD-10-CM

## 2022-08-25 PROCEDURE — 99213 OFFICE O/P EST LOW 20 MIN: CPT | Performed by: INTERNAL MEDICINE

## 2022-08-25 PROCEDURE — 93000 ELECTROCARDIOGRAM COMPLETE: CPT | Performed by: INTERNAL MEDICINE

## 2022-08-25 NOTE — ASSESSMENT & PLAN NOTE
Borderline low ejection fraction by recent echo  Continue beta-blocker therapy  I am hesitant to increase the dose because of problems with depression but if the resting heart rate rises further this can be readdressed

## 2022-08-25 NOTE — PROGRESS NOTES
Patient ID: Beryle Ducking is a 33 y o  male  Plan:      Dilated cardiomyopathy ([facility] [address] )  Borderline low ejection fraction by recent echo  Continue beta-blocker therapy  I am hesitant to increase the dose because of problems with depression but if the resting heart rate rises further this can be readdressed  Tachycardia  See discussion above  Follow up Plan/Other summary comments:  Return in about 1 year (around 8/25/2023)  I will consider repeat echo in 2 years  HPI:   Patient is seen in follow-up today regarding the above issues  Since the last visit with me he had to change his meds about regarding depression  He feels as if he is doing better at present  He is going to be looking for a job shortly  Patient is brought in today with his dad  Resting heart rate has been slightly on the high side although not nearly as high as it had been in the past     Results for orders placed or performed in visit on 08/25/22   POCT ECG    Impression    Sinus tachycardia at 104 beats per minute  Otherwise normal          Most recent or relevant cardiac/vascular testing:      Holter monitor 8/3/2019:  Heart rate varied between 60 and 138 beats per minute  TTE10/3/2019: Normal left ventricular size and function   TTE 07/18/2022:  EF 50%  Past Surgical History:   Procedure Laterality Date    DENTAL SURGERY      TONSILLECTOMY AND ADENOIDECTOMY      Last assessed 7/8/2014     CMP:   Lab Results   Component Value Date    K 3 8 11/07/2018     11/07/2018    CO2 27 11/07/2018    BUN 10 11/07/2018    CREATININE 0 72 11/07/2018    EGFR 132 11/07/2018       Lipid Profile: No results found for: CHOL, TRIG, HDL, LDL      Review of Systems   10  point ROS  was otherwise non pertinent or negative except as per HPI or as below     Gait: Normal          Objective:     /68   Pulse 104   Ht 5' 4" (1 626 m)   Wt 94 8 kg (209 lb)   BMI 35 87 kg/m²     PHYSICAL EXAM:    General:  Normal appearance in no distress  Eyes:  Anicteric  Oral mucosa:  Moist   Neck:  No JVD  Carotid upstrokes are brisk without bruits  No masses  Chest:  Clear to auscultation  Cardiac:  No palpable PMI  Normal S1 and S2  No murmur gallop or rub  Abdomen:  Soft and nontender  No palpable organomegaly or aortic enlargement  Extremities:  No peripheral edema  Musculoskeletal:  Symmetric  Vascular:  Femoral pulses are brisk without bruits  Popliteal pulses are intact bilaterally  Pedal pulses are intact  Neuro:  Grossly symmetric  Psych:  Alert and oriented x3          Current Outpatient Medications:     Caplyta 42 MG CAPS capsule, Take 42 mg by mouth daily, Disp: , Rfl:     Cholecalciferol (Vitamin D) 50 MCG (2000 UT) tablet, Take 2,000 Units by mouth daily, Disp: , Rfl:     clomiPRAMINE (ANAFRANIL) 50 mg capsule, Take 2 capsules by mouth daily at bedtime, Disp: , Rfl:     clotrimazole-betamethasone (LOTRISONE) 1-0 05 % cream, , Disp: , Rfl:     fluticasone (FLONASE) 50 mcg/act nasal spray, instill 2 sprays into each nostril daily, Disp: 32 g, Rfl: 1    hydrOXYzine pamoate (VISTARIL) 25 mg capsule, Take 25 mg by mouth 4 (four) times a day as needed, Disp: , Rfl:     Melatonin 10 MG CAPS, Take 10 mg by mouth daily at bedtime as needed (insomnia), Disp: , Rfl:     metoprolol succinate (TOPROL-XL) 100 mg 24 hr tablet, Take 1 tablet (100 mg total) by mouth daily, Disp: 90 tablet, Rfl: 3    omeprazole (PriLOSEC) 20 mg delayed release capsule, Take 20 mg by mouth Monday, Wednesday, Friday, Disp: , Rfl: 0    Synthroid 50 MCG tablet, TAKE 1 TABLET DAILY, Disp: 90 tablet, Rfl: 1  Allergies   Allergen Reactions    Prednisone      DEPRESSION     Past Medical History:   Diagnosis Date    Autism     Dilated cardiomyopathy     Mononucleosis     Sch[facility]ia ([facility] [address]     Tachycardia 12/10/2018           Social History     Tobacco Use   Smoking Status Never Smoker   Smokeless Tobacco Never Used

## 2022-08-31 ENCOUNTER — TELEMEDICINE (OUTPATIENT)
Dept: FAMILY MEDICINE CLINIC | Facility: CLINIC | Age: 27
End: 2022-08-31
Payer: COMMERCIAL

## 2022-08-31 ENCOUNTER — NURSE TRIAGE (OUTPATIENT)
Dept: OTHER | Facility: OTHER | Age: 27
End: 2022-08-31

## 2022-08-31 DIAGNOSIS — I42.0 DILATED CARDIOMYOPATHY (HCC): ICD-10-CM

## 2022-08-31 DIAGNOSIS — J02.9 SORE THROAT: ICD-10-CM

## 2022-08-31 DIAGNOSIS — B34.9 VIRAL INFECTION, UNSPECIFIED: ICD-10-CM

## 2022-08-31 DIAGNOSIS — Z20.822 EXPOSURE TO COVID-19 VIRUS: Primary | ICD-10-CM

## 2022-08-31 DIAGNOSIS — F20.81: Chronic | ICD-10-CM

## 2022-08-31 DIAGNOSIS — R05.9 COUGH: ICD-10-CM

## 2022-08-31 PROCEDURE — U0005 INFEC AGEN DETEC AMPLI PROBE: HCPCS | Performed by: FAMILY MEDICINE

## 2022-08-31 PROCEDURE — 99214 OFFICE O/P EST MOD 30 MIN: CPT | Performed by: FAMILY MEDICINE

## 2022-08-31 PROCEDURE — U0003 INFECTIOUS AGENT DETECTION BY NUCLEIC ACID (DNA OR RNA); SEVERE ACUTE RESPIRATORY SYNDROME CORONAVIRUS 2 (SARS-COV-2) (CORONAVIRUS DISEASE [COVID-19]), AMPLIFIED PROBE TECHNIQUE, MAKING USE OF HIGH THROUGHPUT TECHNOLOGIES AS DESCRIBED BY CMS-2020-01-R: HCPCS | Performed by: FAMILY MEDICINE

## 2022-08-31 RX ORDER — ZINC SULFATE 50(220)MG
220 CAPSULE ORAL DAILY
Qty: 30 CAPSULE | Refills: 0 | Status: SHIPPED | OUTPATIENT
Start: 2022-08-31 | End: 2022-09-30

## 2022-08-31 RX ORDER — MULTIVIT WITH MINERALS/LUTEIN
1000 TABLET ORAL DAILY
Qty: 30 TABLET | Refills: 0 | Status: SHIPPED | OUTPATIENT
Start: 2022-08-31 | End: 2022-09-30

## 2022-08-31 NOTE — PROGRESS NOTES
COVID-19 Outpatient Progress Note    Assessment/Plan:    Problem List Items Addressed This Visit        Cardiovascular and Mediastinum    Dilated cardiomyopathy ([facility] [address] )       Other    Chronic schizophreniform disorder (HCC) (Chronic)    BMI 35 0-35 9,adult      Other Visit Diagnoses     Exposure to COVID-19 virus    -  Primary    Relevant Medications    Ascorbic Acid (vitamin C) 1000 MG tablet    zinc sulfate (ZINCATE) 220 mg capsule    Other Relevant Orders    COVID Only - Office Collect    Viral infection, unspecified        Relevant Medications    Ascorbic Acid (vitamin C) 1000 MG tablet    zinc sulfate (ZINCATE) 220 mg capsule    Other Relevant Orders    COVID Only - Office Collect    Sore throat        Relevant Orders    COVID Only - Office Collect    Cough        Relevant Orders    COVID Only - Office Collect         Disposition:     Recommended patient to come to the office to test for COVID-19  While awaiting the results of covid testing, patient was advised to isolate  I discussed EUA information and risks/benefits/side effects of antiviral thx in anticipation of positive COVID test and pt meeting eligibility criteria    I have spent 16 minutes directly with the patient  Encounter provider: Brittany Adames DO     Provider located at: [address] [place] [phone]     Recent Visits  No visits were found meeting these conditions  Showing recent visits within past 7 days and meeting all other requirements  Today's Visits  Date Type Provider Dept   08/31/22 Telemedicine Brittany Adames DO [facility] [address] [place] [address] today's visits and meeting all other requirements  Future Appointments  No visits were found meeting these conditions    Showing future appointments within next 150 days and meeting all other requirements     This virtual check-in was done via [employer] and patient was informed that this is a secure, HIPAA-compliant platform  He agrees to proceed  Patient agrees to participate in a virtual check in via telephone or video visit instead of presenting to the office to address urgent/immediate medical needs  Patient is aware this is a billable service  He acknowledged consent and understanding of privacy and security of the video platform  The patient has agreed to participate and understands they can discontinue the visit at any time  After connecting through [facility], the patient was identified by name and date of birth  Jesys Rolle was informed that this was a telemedicine visit and that the exam was being conducted confidentially over secure lines  My office door was closed  No one else was in the room  Jessy Rolle acknowledged consent and understanding of privacy and security of the telemedicine visit  I informed the patient that I have reviewed his record in Epic and presented the opportunity for him to ask any questions regarding the visit today  The patient agreed to participate  Verification of patient location:  Patient is located in the following state in which I hold an active license: [place]    Subjective:   Jessy Rolle is a 33 y o  male who is concerned about COVID-19  Patient's symptoms include chills ("little bit"), nasal congestion ("little bit"), sore throat, cough, chest tightness and headache ("slight")  Patient denies fever, shortness of breath, abdominal pain, nausea, vomiting and diarrhea   Fatigue: "little bit"     - Date of symptom onset: 8/30/2022  - Date of exposure: 8/29/2022      COVID-19 vaccination status: Fully vaccinated with booster    Exposure:   Contact with a person who is under investigation (PUI) for or who is positive for COVID-19 within the last 14 days?: Yes    Hospitalized recently for fever and/or lower respiratory symptoms?: No      Currently a healthcare worker that is involved in direct patient care?: No      Works in a special setting where the risk of COVID-19 transmission may be high? (this may include long-term care, correctional and care home facilities; homeless shelters; assisted-living facilities and group homes ): No      Resident in a special setting where the risk of COVID-19 transmission may be high? (this may include long-term care, correctional and care home facilities; homeless shelters; assisted-living facilities and group homes ): No      Lab Results   Component Value Date    SARSCOV2 Negative 04/29/2021     Past Medical History:   Diagnosis Date    Autism     Dilated cardiomyopathy     Mononucleosis     Morbid obesity with BMI of 40 0-44 9, adult ([facility] [address] 10/5/2016    Schizophrenia ([facility] [address]     Tachycardia 12/10/2018     Past Surgical History:   Procedure Laterality Date    DENTAL SURGERY      TONSILLECTOMY AND ADENOIDECTOMY      Last assessed 7/8/2014     Current Outpatient Medications   Medication Sig Dispense Refill    Ascorbic Acid (vitamin C) 1000 MG tablet Take 1 tablet (1,000 mg total) by mouth daily 30 tablet 0    Caplyta 42 MG CAPS capsule Take 42 mg by mouth daily      Cholecalciferol (Vitamin D) 50 MCG (2000 UT) tablet Take 2,000 Units by mouth daily      clomiPRAMINE (ANAFRANIL) 50 mg capsule Take 2 capsules by mouth daily at bedtime      hydrOXYzine pamoate (VISTARIL) 25 mg capsule Take 25 mg by mouth 4 (four) times a day as needed      metoprolol succinate (TOPROL-XL) 100 mg 24 hr tablet Take 1 tablet (100 mg total) by mouth daily 90 tablet 3    omeprazole (PriLOSEC) 20 mg delayed release capsule Take 20 mg by mouth Monday, Wednesday, Friday  0    Synthroid 50 MCG tablet TAKE 1 TABLET DAILY 90 tablet 1    zinc sulfate (ZINCATE) 220 mg capsule Take 1 capsule (220 mg total) by mouth daily 30 capsule 0    clotrimazole-betamethasone (LOTRISONE) 1-0 05 % cream       fluticasone (FLONASE) 50 mcg/act nasal spray instill 2 sprays into each nostril daily 32 g 1    Melatonin 10 MG CAPS Take 10 mg by mouth daily at bedtime as needed (insomnia)       No current facility-administered medications for this visit  Allergies   Allergen Reactions    Prednisone      DEPRESSION       Review of Systems   Constitutional: Positive for chills ("little bit")  Negative for fever  Fatigue: "little bit"   HENT: Positive for congestion ("little bit") and sore throat  Respiratory: Positive for cough and chest tightness  Negative for shortness of breath  Gastrointestinal: Negative for abdominal pain, diarrhea, nausea and vomiting  Neurological: Positive for headaches ("slight")  Objective: There were no vitals filed for this visit  Physical Exam  Constitutional:       General: He is not in acute distress  Appearance: He is well-developed and well-groomed  He is ill-appearing (mildly)  He is not toxic-appearing or diaphoretic  HENT:      Head: Normocephalic and atraumatic  Mouth/Throat:      Mouth: Mucous membranes are moist       Pharynx: Oropharynx is clear  Pulmonary:      Effort: Pulmonary effort is normal    Neurological:      Mental Status: He is alert  Gait: Gait normal    Psychiatric:         Mood and Affect: Mood normal          Behavior: Behavior normal  Behavior is cooperative

## 2022-08-31 NOTE — TELEPHONE ENCOUNTER
Patient's mother reports that patient was exposed to [place] and is having some symptoms  She would like him tested and a call back to advise  1  Were you within 6 feet or less, for up to 15 minutes or more with a person that has a confirmed COVID-19 test? Yes  2  What was the date of your exposure? Lives with  3  Are you experiencing any symptoms attributed to the virus?  (Assess for SOB, cough, fever, difficulty breathing) Sore throat, cough, congestion, onset 8/30/22  4  HIGH RISK: Do you have any history heart or lung conditions, weakened immune system, diabetes, Asthma, CHF, HIV, COPD, Chemo, renal failure, sickle cell, etc? Tachycardia   5  PREGNANCY: Are you pregnant or did you recently give birth?  N/A  6  VACCINE: "Have you gotten the COVID-19 vaccine?" If Yes ask: "Which one, how many shots, when did you get it?" 3      Reason for Disposition   [1] COVID-19 infection suspected by caller or triager AND [2] mild symptoms (cough, fever, or others) AND [3] has not gotten tested yet    Protocols used: CORONAVIRUS (COVID-19) DIAGNOSED OR SUSPECTED-ADULT-OH

## 2022-09-01 ENCOUNTER — OFFICE VISIT (OUTPATIENT)
Dept: URGENT CARE | Facility: CLINIC | Age: 27
End: 2022-09-01
Payer: COMMERCIAL

## 2022-09-01 VITALS
WEIGHT: 208 LBS | DIASTOLIC BLOOD PRESSURE: 70 MMHG | HEART RATE: 120 BPM | TEMPERATURE: 98.6 F | OXYGEN SATURATION: 93 % | BODY MASS INDEX: 35.51 KG/M2 | RESPIRATION RATE: 18 BRPM | SYSTOLIC BLOOD PRESSURE: 120 MMHG | HEIGHT: 64 IN

## 2022-09-01 DIAGNOSIS — I42.0 DILATED CARDIOMYOPATHY (HCC): ICD-10-CM

## 2022-09-01 DIAGNOSIS — U07.1 COVID-19 VIRUS INFECTION: Primary | ICD-10-CM

## 2022-09-01 DIAGNOSIS — J02.9 SORE THROAT: ICD-10-CM

## 2022-09-01 DIAGNOSIS — B34.9 VIRAL ILLNESS: Primary | ICD-10-CM

## 2022-09-01 LAB
S PYO AG THROAT QL: NEGATIVE
SARS-COV-2 RNA RESP QL NAA+PROBE: POSITIVE

## 2022-09-01 PROCEDURE — G0382 LEV 3 HOSP TYPE B ED VISIT: HCPCS | Performed by: PHYSICIAN ASSISTANT

## 2022-09-01 PROCEDURE — 87070 CULTURE OTHR SPECIMN AEROBIC: CPT | Performed by: PHYSICIAN ASSISTANT

## 2022-09-01 PROCEDURE — 87880 STREP A ASSAY W/OPTIC: CPT | Performed by: PHYSICIAN ASSISTANT

## 2022-09-01 RX ORDER — PALIPERIDONE 3 MG/1
3 TABLET, EXTENDED RELEASE ORAL DAILY
COMMUNITY
Start: 2022-08-14

## 2022-09-01 RX ORDER — BENZONATATE 100 MG/1
100 CAPSULE ORAL 3 TIMES DAILY PRN
Qty: 20 CAPSULE | Refills: 0 | Status: SHIPPED | OUTPATIENT
Start: 2022-09-01

## 2022-09-01 RX ORDER — NIRMATRELVIR AND RITONAVIR 300-100 MG
3 KIT ORAL 2 TIMES DAILY
Qty: 30 TABLET | Refills: 0 | Status: SHIPPED | OUTPATIENT
Start: 2022-09-01 | End: 2022-09-06

## 2022-09-01 RX ORDER — NIRMATRELVIR AND RITONAVIR 300-100 MG
3 KIT ORAL 2 TIMES DAILY
Qty: 30 TABLET | Refills: 0 | Status: SHIPPED | OUTPATIENT
Start: 2022-09-01 | End: 2022-09-01 | Stop reason: CLARIF

## 2022-09-01 RX ORDER — MIRTAZAPINE 15 MG/1
15 TABLET, FILM COATED ORAL
COMMUNITY
Start: 2022-08-09

## 2022-09-01 NOTE — PROGRESS NOTES
[address] [facility]      NAME: Landon Boyle is a 33 y o  male  : 1995    MRN: [id number]  DATE: 2022  TIME: 9:38 AM    Assessment and Plan   Viral illness [B34 9]  1  Viral illness  benzonatate (TESSALON PERLES) 100 mg capsule   2  Sore throat  POCT rapid strepA    Throat culture       Patient Instructions   Rapid strep test completed and negative  Will send for culture and call patient if positive  Covid test that was done yesterday should be resulted soon  To contact PCP (order physician for these results) and possible covid medication if indicated  Otherwise OTC mucinex  Tessalon perles as directed  Alternate tylenol/ibu for sore throat, cough drops, hot tea/honey, chloraseptic spray  Parents and patient verbalized understanding of plan  Call PCP later today for results/fu  To present to the ER if symptoms worsen  Chief Complaint     Chief Complaint   Patient presents with    Sore Throat     Since Tuesday  Coughing worse at might  History of Present Illness   Landon Boyle presents to the clinic with parents c/o    Sore Throat   This is a new problem  Episode onset:   The problem has been unchanged  Neither side of throat is experiencing more pain than the other  There has been no fever  The pain is at a severity of 9/10  Associated symptoms include congestion and coughing  Pertinent negatives include no abdominal pain, ear discharge, ear pain, headaches, shortness of breath, swollen glands or trouble swallowing  Treatments tried: vitamins and zinc  The treatment provided no relief  Review of Systems   Review of Systems   Constitutional: Negative for chills, diaphoresis, fatigue and fever  HENT: Positive for congestion and sore throat  Negative for ear discharge, ear pain, facial swelling and trouble swallowing  Eyes: Negative for photophobia, pain, discharge, redness, itching and visual disturbance  Respiratory: Positive for cough   Negative for apnea, chest tightness, shortness of breath and wheezing  Cardiovascular: Negative for chest pain and palpitations  Gastrointestinal: Negative for abdominal pain  Skin: Negative for color change, rash and wound  Neurological: Negative for dizziness and headaches  Hematological: Negative for adenopathy           Current Medications     Long-Term Medications   Medication Sig Dispense Refill    Ascorbic Acid (vitamin C) 1000 MG tablet Take 1 tablet (1,000 mg total) by mouth daily 30 tablet 0    Cholecalciferol (Vitamin D) 50 MCG (2000 UT) tablet Take 2,000 Units by mouth daily      hydrOXYzine pamoate (VISTARIL) 25 mg capsule Take 25 mg by mouth 4 (four) times a day as needed      metoprolol succinate (TOPROL-XL) 100 mg 24 hr tablet Take 1 tablet (100 mg total) by mouth daily 90 tablet 3    mirtazapine (REMERON) 15 mg tablet Take 15 mg by mouth daily at bedtime      omeprazole (PriLOSEC) 20 mg delayed release capsule Take 20 mg by mouth Monday, Wednesday, Friday  0    paliperidone (INVEGA) 3 mg 24 hr tablet Take 3 mg by mouth daily      Synthroid 50 MCG tablet TAKE 1 TABLET DAILY 90 tablet 1    zinc sulfate (ZINCATE) 220 mg capsule Take 1 capsule (220 mg total) by mouth daily 30 capsule 0    clomiPRAMINE (ANAFRANIL) 50 mg capsule Take 2 capsules by mouth daily at bedtime (Patient not taking: Reported on 9/1/2022)      clotrimazole-betamethasone (LOTRISONE) 1-0 05 % cream  (Patient not taking: Reported on 9/1/2022)      fluticasone (FLONASE) 50 mcg/act nasal spray instill 2 sprays into each nostril daily (Patient not taking: Reported on 9/1/2022) 32 g 1       Current Allergies     Allergies as of 09/01/2022 - Reviewed 09/01/2022   Allergen Reaction Noted    Prednisone  02/23/2015            The following portions of the patient's history were reviewed and updated as appropriate: allergies, current medications, past family history, past medical history, past social history, past surgical history and problem list   Past Medical History:   Diagnosis Date    Autism     Dilated cardiomyopathy     Mononucleosis     Morbid obesity with BMI of 40 0-44 9, adult ([facility] ) 10/5/2016    Schizophrenia ([facility] )     Tachycardia 12/10/2018     Past Surgical History:   Procedure Laterality Date    DENTAL SURGERY      TONSILLECTOMY AND ADENOIDECTOMY      Last assessed 7/8/2014     Social History     Socioeconomic History    Marital status: Single     Spouse name: Not on file    Number of children: Not on file    Years of education: Not on file    Highest education level: Not on file   Occupational History    Not on file   Tobacco Use    Smoking status: Never Smoker    Smokeless tobacco: Never Used   Vaping Use    Vaping Use: Never used   Substance and Sexual Activity    Alcohol use: No    Drug use: No    Sexual activity: Never   Other Topics Concern    Not on file   Social History Narrative    Uses seatbelts     Social Determinants of Health     Financial Resource Strain: Not on file   Food Insecurity: Not on file   Transportation Needs: Not on file   Physical Activity: Not on file   Stress: Not on file   Social Connections: Not on file   Intimate Partner Violence: Not on file   Housing Stability: Not on file       Objective   /70   Pulse (!) 120   Temp 98 6 °F (37 °C)   Resp 18   Ht 5' 4" (1 626 m)   Wt 94 3 kg (208 lb)   SpO2 93%   BMI 35 70 kg/m²      Physical Exam     Physical Exam  Vitals and nursing note reviewed  Constitutional:       General: He is not in acute distress  Appearance: He is well-developed  He is not diaphoretic  HENT:      Head: Normocephalic and atraumatic  Right Ear: Tympanic membrane and external ear normal  Tympanic membrane is not erythematous  Left Ear: Tympanic membrane and external ear normal  Tympanic membrane is not erythematous  Nose: Nose normal       Mouth/Throat:      Mouth: Mucous membranes are moist       Pharynx: Oropharynx is clear   Posterior oropharyngeal erythema (mild) present  No oropharyngeal exudate  Eyes:      General: No scleral icterus  Right eye: No discharge  Left eye: No discharge  Conjunctiva/sclera: Conjunctivae normal    Cardiovascular:      Rate and Rhythm: Normal rate and regular rhythm  Heart sounds: Normal heart sounds  No murmur heard  No friction rub  No gallop  Pulmonary:      Effort: Pulmonary effort is normal  No respiratory distress  Breath sounds: Normal breath sounds  No decreased breath sounds, wheezing, rhonchi or rales  Skin:     General: Skin is warm and dry  Coloration: Skin is not pale  Findings: No erythema or rash  Neurological:      Mental Status: He is alert and oriented to person, place, and time  Psychiatric:         Behavior: Behavior normal          Thought Content:  Thought content normal          Judgment: Judgment normal          Debi Lopes PA-C

## 2022-09-04 LAB — BACTERIA THROAT CULT: NORMAL

## 2022-09-22 DIAGNOSIS — I42.0 DILATED CARDIOMYOPATHY (HCC): ICD-10-CM

## 2022-09-22 DIAGNOSIS — R00.0 TACHYCARDIA: ICD-10-CM

## 2022-09-22 RX ORDER — METOPROLOL SUCCINATE 100 MG/1
100 TABLET, EXTENDED RELEASE ORAL DAILY
Qty: 90 TABLET | Refills: 3 | Status: SHIPPED | OUTPATIENT
Start: 2022-09-22

## 2022-10-11 ENCOUNTER — CLINICAL SUPPORT (OUTPATIENT)
Dept: FAMILY MEDICINE CLINIC | Facility: CLINIC | Age: 27
End: 2022-10-11
Payer: COMMERCIAL

## 2022-10-11 DIAGNOSIS — Z23 FLU VACCINE NEED: Primary | ICD-10-CM

## 2022-10-11 PROCEDURE — 90471 IMMUNIZATION ADMIN: CPT

## 2022-10-11 PROCEDURE — 90686 IIV4 VACC NO PRSV 0.5 ML IM: CPT

## 2022-12-21 DIAGNOSIS — E03.9 HYPOTHYROIDISM, UNSPECIFIED TYPE: ICD-10-CM

## 2022-12-23 RX ORDER — LEVOTHYROXINE SODIUM 50 MCG
TABLET ORAL
Qty: 90 TABLET | Refills: 0 | Status: SHIPPED | OUTPATIENT
Start: 2022-12-23

## 2023-01-17 ENCOUNTER — TELEPHONE (OUTPATIENT)
Dept: PSYCHIATRY | Facility: CLINIC | Age: 28
End: 2023-01-17

## 2023-01-17 NOTE — TELEPHONE ENCOUNTER
Sent Wait List Letter VIA [employer]   Verify patients need of services from wait list after 10 days   If no communication remove from wait list

## 2023-01-31 ENCOUNTER — HOSPITAL ENCOUNTER (OUTPATIENT)
Dept: ULTRASOUND IMAGING | Facility: HOSPITAL | Age: 28
Discharge: HOME | End: 2023-01-31

## 2023-01-31 DIAGNOSIS — R10.9 UNSPECIFIED ABDOMINAL PAIN: ICD-10-CM

## 2023-01-31 DIAGNOSIS — K76.0 FATTY (CHANGE OF) LIVER, NOT ELSEWHERE CLASSIFIED: ICD-10-CM

## 2023-01-31 DIAGNOSIS — R19.5 OTHER FECAL ABNORMALITIES: ICD-10-CM

## 2023-02-09 ENCOUNTER — OFFICE VISIT (OUTPATIENT)
Dept: FAMILY MEDICINE CLINIC | Facility: CLINIC | Age: 28
End: 2023-02-09

## 2023-02-09 VITALS
TEMPERATURE: 98 F | OXYGEN SATURATION: 96 % | SYSTOLIC BLOOD PRESSURE: 122 MMHG | WEIGHT: 229 LBS | HEIGHT: 64 IN | DIASTOLIC BLOOD PRESSURE: 74 MMHG | HEART RATE: 88 BPM | BODY MASS INDEX: 39.09 KG/M2

## 2023-02-09 DIAGNOSIS — F84.0 AUTISTIC DISORDER, ACTIVE: Chronic | ICD-10-CM

## 2023-02-09 DIAGNOSIS — I42.0 DILATED CARDIOMYOPATHY (HCC): Primary | ICD-10-CM

## 2023-02-09 DIAGNOSIS — F20.81: Chronic | ICD-10-CM

## 2023-02-09 PROBLEM — E87.20 LACTIC ACIDOSIS: Chronic | Status: RESOLVED | Noted: 2018-11-01 | Resolved: 2023-02-09

## 2023-02-09 PROBLEM — R00.2 PALPITATIONS: Status: RESOLVED | Noted: 2019-08-30 | Resolved: 2023-02-09

## 2023-02-09 RX ORDER — DARIDOREXANT 50 MG/1
1 TABLET, FILM COATED ORAL
COMMUNITY

## 2023-02-09 RX ORDER — PALIPERIDONE 6 MG/1
6 TABLET, EXTENDED RELEASE ORAL DAILY
COMMUNITY
Start: 2022-12-28

## 2023-02-09 NOTE — ASSESSMENT & PLAN NOTE
Redness on hands likely eczema triggered by warm water and excessive handwashing  Decrease triggers  Trial low-dose steroid cream and eczema lotion    Call back if no improvement will increase to moderate strength steroid cream

## 2023-02-09 NOTE — PROGRESS NOTES
Name: Concepción Rolle      : 1995      MRN: [id number]  Encounter Provider: Eliseo Lowe MD  Encounter Date: 2023   Encounter department: FAMILY PRACTICE AT [facility]     1  Dilated cardiomyopathy ([facility] )  Assessment & Plan:  Stable  Following with cardiology  Continue metoprolol  2  Chronic schizophreniform disorder ([facility] )  Assessment & Plan:  Stable  Follows with psych  On [place]   3  Autistic disorder, active         Subjective      Presents to the office with mom for follow-up  Top of his foot hurts after banging it on heater a few weeks ago  They have not tried any medications at home to help with the pain  He is able to walk and go up stairs fine  No trouble bearing weight  Pain does not radiate  No weakness  Has history of cardiomyopathy  Follows with cardiology  Currently on metoprolol  Denies any symptoms  Also has behavioral health disorders and he follows with psych  Is on chronic medications and tolerating them well  No other symptoms  Review of Systems   Constitutional: Negative for chills and fever  HENT: Negative for ear pain and sore throat  Eyes: Negative for pain and visual disturbance  Respiratory: Negative for cough and shortness of breath  Cardiovascular: Negative for chest pain and palpitations  Gastrointestinal: Negative for abdominal pain and vomiting  Genitourinary: Negative for dysuria and hematuria  Musculoskeletal: Negative for arthralgias and back pain  Skin: Negative for color change and rash  Neurological: Negative for seizures and syncope  All other systems reviewed and are negative        Current Outpatient Medications on File Prior to Visit   Medication Sig   • Cholecalciferol (Vitamin D) 50 MCG ( UT) tablet Take 2,000 Units by mouth daily   • Daridorexant HCl (Quviviq) 50 MG TABS Take 1 tablet by mouth daily at bedtime   • metoprolol succinate (TOPROL-XL) 100 mg 24 hr tablet Take 1 tablet (100 mg total) by mouth daily   • omeprazole (PriLOSEC) 20 mg delayed release capsule Take 20 mg by mouth Monday, Wednesday, Friday   • paliperidone (INVEGA) 6 MG 24 hr tablet Take 6 mg by mouth daily   • Synthroid 50 MCG tablet TAKE 1 TABLET DAILY   • fluticasone (FLONASE) 50 mcg/act nasal spray instill 2 sprays into each nostril daily (Patient not taking: Reported on 9/1/2022)   • [DISCONTINUED] Ascorbic Acid (vitamin C) 1000 MG tablet Take 1 tablet (1,000 mg total) by mouth daily (Patient not taking: Reported on 2/9/2023)   • [DISCONTINUED] benzonatate (TESSALON PERLES) 100 mg capsule Take 1 capsule (100 mg total) by mouth 3 (three) times a day as needed for cough (Patient not taking: Reported on 2/9/2023)   • [DISCONTINUED] Caplyta 42 MG CAPS capsule Take 42 mg by mouth daily (Patient not taking: Reported on 9/1/2022)   • [DISCONTINUED] clomiPRAMINE (ANAFRANIL) 50 mg capsule Take 2 capsules by mouth daily at bedtime (Patient not taking: Reported on 9/1/2022)   • [DISCONTINUED] clotrimazole-betamethasone (LOTRISONE) 1-0 05 % cream  (Patient not taking: Reported on 9/1/2022)   • [DISCONTINUED] hydrOXYzine pamoate (VISTARIL) 25 mg capsule Take 25 mg by mouth 4 (four) times a day as needed (Patient not taking: Reported on 2/9/2023)   • [DISCONTINUED] Melatonin 10 MG CAPS Take 10 mg by mouth daily at bedtime as needed (insomnia) (Patient not taking: Reported on 9/1/2022)   • [DISCONTINUED] mirtazapine (REMERON) 15 mg tablet Take 15 mg by mouth daily at bedtime (Patient not taking: Reported on 2/9/2023)   • [DISCONTINUED] paliperidone (INVEGA) 3 mg 24 hr tablet Take 3 mg by mouth daily (Patient not taking: Reported on 2/9/2023)   • [DISCONTINUED] zinc sulfate (ZINCATE) 220 mg capsule Take 1 capsule (220 mg total) by mouth daily (Patient not taking: Reported on 2/9/2023)       Objective     /74 (BP Location: Left arm, Patient Position: Sitting)   Pulse 88   Temp 98 °F (36 7 °C)   Ht 5' 4" (1 626 m)   Wt 104 kg (229 lb)   SpO2 96%   BMI 39 31 kg/m²     Physical Exam  Vitals and nursing note reviewed  Constitutional:       General: He is not in acute distress  Appearance: Normal appearance  He is not ill-appearing, toxic-appearing or diaphoretic  Eyes:      General:         Right eye: No discharge  Left eye: No discharge  Extraocular Movements: Extraocular movements intact  Conjunctiva/sclera: Conjunctivae normal    Cardiovascular:      Rate and Rhythm: Normal rate  Pulmonary:      Effort: Pulmonary effort is normal    Musculoskeletal:      Cervical back: Normal range of motion and neck supple  Neurological:      Mental Status: He is alert and oriented to person, place, and time  Mental status is at baseline  Psychiatric:         Mood and Affect: Mood normal          Behavior: Behavior normal          Thought Content:  Thought content normal          Judgment: Judgment normal        Marge Aaron MD

## 2023-03-30 ENCOUNTER — OFFICE VISIT (OUTPATIENT)
Dept: BARIATRICS | Facility: CLINIC | Age: 28
End: 2023-03-30

## 2023-03-30 VITALS
WEIGHT: 210 LBS | DIASTOLIC BLOOD PRESSURE: 70 MMHG | HEART RATE: 78 BPM | HEIGHT: 64 IN | RESPIRATION RATE: 18 BRPM | OXYGEN SATURATION: 97 % | SYSTOLIC BLOOD PRESSURE: 118 MMHG | TEMPERATURE: 98.1 F | BODY MASS INDEX: 35.85 KG/M2

## 2023-03-30 DIAGNOSIS — R73.01 ELEVATED FASTING GLUCOSE: Primary | ICD-10-CM

## 2023-03-30 DIAGNOSIS — G47.33 OSA (OBSTRUCTIVE SLEEP APNEA): ICD-10-CM

## 2023-03-30 DIAGNOSIS — E66.01 SEVERE OBESITY (BMI 35.0-39.9) WITH COMORBIDITY (HCC): Primary | ICD-10-CM

## 2023-03-30 DIAGNOSIS — F20.81: Chronic | ICD-10-CM

## 2023-03-30 DIAGNOSIS — K76.0 FATTY LIVER: ICD-10-CM

## 2023-03-30 DIAGNOSIS — I42.0 DILATED CARDIOMYOPATHY (HCC): ICD-10-CM

## 2023-03-30 PROBLEM — E03.9 HYPOTHYROID: Status: ACTIVE | Noted: 2023-03-30

## 2023-03-30 NOTE — ASSESSMENT & PLAN NOTE
-Discussed options of [facility]-Intensive Lifestyle Intervention Program and Conservative Program and the role of weight loss medications   -Initial weight loss goal of 5-10% weight loss for improved health  -Not a candidate for bariatric surgery  -Screening labs: reviewed CMP, Lipid panel, TSH  -Patient is interested in pursuing conservative program  -Calorie goals, sample menu, portion size guidelines, and food logging reviewed with the patient       Initial weight (3/18/21) 237 lbs  Current weight: 210 lbs

## 2023-03-30 NOTE — PROGRESS NOTES
Assessment/Plan:    Severe obesity (BMI 35 0-39  9) with comorbidity ([facility] )  -Discussed options of [facility]ensive Lifestyle Intervention Program and Conservative Program and the role of weight loss medications   -Initial weight loss goal of 5-10% weight loss for improved health  -Not a candidate for bariatric surgery  -Screening labs: reviewed CMP, Lipid panel, TSH  -Patient is interested in pursuing conservative program  -Calorie goals, sample menu, portion size guidelines, and food logging reviewed with the patient  Initial weight (3/18/21) 237 lbs  Current weight: 210 lbs    Fatty liver  -advise minimum 10% TBW loss  -LFTs WNL    Dilated cardiomyopathy (HCC)  -On Toprol XL  -followed by cardiology    Chronic schizophreniform disorder ([facility] )  -On Daridorexant and Paliperidone (weight +)  -managed by Psych    Hypothyroid  -On Synthroid  -TSH WNL    Goals:    Food log (ie ) www myfitnesspal com,sparkpeople  com,loseit com,calorieking  com,etc    No sugary beverages  At least 64oz of water daily  Increase physical activity by 10 minutes daily  Gradually increase physical activity to a goal of 5 days per week for 30 minutes of MODERATE intensity PLUS 2 days per week of FULL BODY resistance training  4545-6830 calories per day  5-10 servings of fruits and vegetables per day    Follow up in approximately 2 weeks with Non-Surgical Dietician and 8 weeks with PA-C     Diagnoses and all orders for this visit:    Severe obesity (BMI 35 0-39  9) with comorbidity (HCC)    DARREN (obstructive sleep apnea)    Fatty liver    Dilated cardiomyopathy (HCC)    Chronic schizophreniform disorder (HCC)          Subjective:   No chief complaint on file  Patient ID: Kalia Mosley  is a 33 y o  male with excess weight/obesity here to pursue weight managment  Patient is pursuing Conservative Program      HPI Patient presents for overdue MWM follow up  Last seen 2 years ago  Having some GI issues recently with heartburn   Having "some studies done, had US recently  Mother reports all studies have been normal so far      Hydration: water 40-60oz, powerade zero  Exercise: no formal exercise, walks occasionally  Sleep: 6-7 hours  Occupation: in day program for mental health, not currently working    B: eggs and marroquin OR skips OR pancakes +/- syrup and marroquin  S: skips  L: creamy broccoli soup OR dines out 3x per week eats pizza, usually eats at grandmothers  S: skips  D: spaghetti w/ sausage with salad with garlic bread  S: frozen yogurt    Wt Readings from Last 10 Encounters:   03/30/23 95 3 kg (210 lb)   02/09/23 104 kg (229 lb)   09/01/22 94 3 kg (208 lb)   08/25/22 94 8 kg (209 lb)   07/18/22 94 3 kg (208 lb)   06/20/22 94 3 kg (208 lb)   05/25/22 92 5 kg (204 lb)   11/26/21 98 9 kg (218 lb)   07/06/21 103 kg (226 lb 12 8 oz)   03/18/21 108 kg (237 lb)        The following portions of the patient's history were reviewed and updated as appropriate: allergies, current medications, past family history, past medical history, past social history, past surgical history and problem list     Review of Systems   Respiratory: Negative for cough and shortness of breath  Cardiovascular: Negative for chest pain and palpitations  Gastrointestinal:        + GERD   Psychiatric/Behavioral:        Reports mood is labile, reports recently improved       Objective:    /70 (BP Location: Right arm, Patient Position: Sitting, Cuff Size: Standard)   Pulse 78   Temp 98 1 °F (36 7 °C) (Temporal)   Resp 18   Ht 5' 4\" (1 626 m)   Wt 95 3 kg (210 lb)   SpO2 97%   BMI 36 05 kg/m²      Physical Exam  Vitals and nursing note reviewed  Constitutional:       General: He is not in acute distress  Appearance: He is obese  He is not ill-appearing or toxic-appearing  HENT:      Head: Normocephalic and atraumatic  Mouth/Throat:      Mouth: Mucous membranes are moist    Eyes:      General: No scleral icterus    Pulmonary:      Effort: Pulmonary effort " is normal  No respiratory distress  Abdominal:      Comments: Obese, protuberant   Musculoskeletal:         General: Normal range of motion  Skin:     General: Skin is warm and dry  Neurological:      Mental Status: He is alert and oriented to person, place, and time  Mental status is at baseline  Psychiatric:         Mood and Affect: Mood normal          Thought Content:  Thought content normal

## 2023-03-30 NOTE — PATIENT INSTRUCTIONS
Goals: Food log (ie ) www myfitnesspal com,sparkpeople  com,loseit com,calorieking  com,etc    No sugary beverages  At least 64oz of water daily  Increase physical activity by 10 minutes daily   Gradually increase physical activity to a goal of 5 days per week for 30 minutes of MODERATE intensity PLUS 2 days per week of FULL BODY resistance training  3200-4042 calories per day  5-10 servings of fruits and vegetables per day

## 2023-04-02 DIAGNOSIS — E03.9 HYPOTHYROIDISM, UNSPECIFIED TYPE: ICD-10-CM

## 2023-04-03 RX ORDER — LEVOTHYROXINE SODIUM 50 MCG
TABLET ORAL
Qty: 90 TABLET | Refills: 0 | Status: SHIPPED | OUTPATIENT
Start: 2023-04-03

## 2023-04-03 NOTE — TELEPHONE ENCOUNTER
Requested medication(s) are due for refill today: Yes  Patient has already received a courtesy refill: No  Other reason request has been forwarded to provider: failed refill protocol

## 2023-04-14 NOTE — ASSESSMENT & PLAN NOTE
Resolved with better heart rate control  Tarsorrhaphy Text: A tarsorrhaphy was performed using Frost sutures.

## 2023-04-26 NOTE — PLAN OF CARE
Problem: PHYSICAL THERAPY ADULT  Goal: Performs mobility at highest level of function for planned discharge setting  See evaluation for individualized goals  Treatment/Interventions: Functional transfer training, Therapeutic exercise, Endurance training, Cognitive reorientation, Patient/family training, Equipment eval/education, Gait training  Equipment Recommended: Juany Akins       See flowsheet documentation for full assessment, interventions and recommendations  [facility], [facility]     Outcome: Progressing  Prognosis: Good  Problem List: Decreased strength, Decreased endurance, Impaired balance, Decreased mobility, Decreased coordination, Decreased cognition, Impaired judgement  Assessment: Pt agreed to PT session & mother was present  He was able to transfer [address] without  x 2 with supervision for safety  His gait was steady, no LOB  He returned to room to rest between amb trials  He performed B LE ther ex as per flowsheet in bed post amb to end session  All need in reach  He tolerated session well & should be safe ambulating at home upon d/c with supervision of parents  Recommendation: Home PT, Home with family support     PT - OK to Discharge: Yes (when med cleared)    See flowsheet documentation for full assessment     Rita Hope, PTA This is a recent snapshot of the patient's [facility] medical record.  For current drug dose and complete information and questions, call [phone] or In Basket pool, [facility] home infusion ([id number])  CSN Number:  [id number]       Yes

## 2023-05-09 ENCOUNTER — OFFICE VISIT (OUTPATIENT)
Dept: FAMILY MEDICINE CLINIC | Facility: CLINIC | Age: 28
End: 2023-05-09

## 2023-05-09 VITALS
HEIGHT: 64 IN | TEMPERATURE: 98.5 F | OXYGEN SATURATION: 96 % | DIASTOLIC BLOOD PRESSURE: 78 MMHG | HEART RATE: 96 BPM | BODY MASS INDEX: 35.89 KG/M2 | SYSTOLIC BLOOD PRESSURE: 122 MMHG | WEIGHT: 210.2 LBS

## 2023-05-09 DIAGNOSIS — Z00.00 ANNUAL PHYSICAL EXAM: Primary | ICD-10-CM

## 2023-05-09 NOTE — PROGRESS NOTES
[facility]    NAME: Ishmael Mello  AGE: 33 y o  SEX: male  : 1995     DATE: 2023     Assessment and Plan:     Problem List Items Addressed This Visit    None  Visit Diagnoses     Annual physical exam    -  Primary              Immunizations and preventive care screenings were discussed with patient today  Appropriate education was printed on patient's after visit summary  Counseling:  Alcohol/drug use: discussed moderation in alcohol intake, the recommendations for healthy alcohol use, and avoidance of illicit drug use  Dental Health: discussed importance of regular tooth brushing, flossing, and dental visits  Injury prevention: discussed safety/seat belts, safety helmets, smoke detectors, carbon dioxide detectors, and smoking near bedding or upholstery  Sexual health: discussed sexually transmitted diseases, partner selection, use of condoms, avoidance of unintended pregnancy, and contraceptive alternatives  · Exercise: the importance of regular exercise/physical activity was discussed  Recommend exercise 3-5 times per week for at least 30 minutes  Return in about 6 months (around 2023) for Recheck  Chief Complaint:     Chief Complaint   Patient presents with   • Physical Exam      History of Present Illness:     Adult Annual Physical   Patient here for a comprehensive physical exam  The patient reports no problems  Diet and Physical Activity  · Diet/Nutrition: well balanced diet  · Exercise: no formal exercise  Depression Screening  PHQ-2/9 Depression Screening         General Health  · Sleep: sleeps well  · Hearing: normal - bilateral   · Vision: no vision problems  · Dental: brushes teeth once daily   Health  · History of STDs?: no      Review of Systems:     Review of Systems   All other systems reviewed and are negative       Past Medical History:     Past Medical History: Diagnosis Date   • Autism    • Dilated cardiomyopathy    • Mononucleosis    • Morbid [facility] with BMI of 40 0-44 9, [facility] ) 10/5/2016   • Schizophrenia ([facility] )    • Tachycardia 12/10/2018      Past Surgical History:     Past Surgical History:   Procedure Laterality Date   • DENTAL SURGERY     • TONSILLECTOMY AND ADENOIDECTOMY      Last assessed 7/8/2014      Social History:     Social History     Socioeconomic History   • Marital status: Single     Spouse name: None   • Number of children: None   • Years of education: None   • Highest education level: None   Occupational History   • None   Tobacco Use   • Smoking status: Never   • Smokeless tobacco: Never   Vaping Use   • Vaping Use: Never used   Substance and Sexual Activity   • Alcohol use: No   • Drug use: No   • Sexual activity: Never   Other Topics Concern   • None   Social History Narrative    Uses seatbelts     Social Determinants of Health     Financial Resource Strain: Not on file   Food Insecurity: Not on file   Transportation Needs: Not on file   Physical Activity: Not on file   Stress: Not on file   Social Connections: Not on file   Intimate Partner Violence: Not on file   Housing Stability: Not on file      Family History:     Family History   Problem Relation Age of Onset   • Hypothyroidism Father    • Cancer Family    • Diabetes Family    • Hyperlipidemia Family    • Stroke Family    • Obesity Mother    • Hypothyroidism Mother    • Heart disease Maternal Grandfather    • Hypothyroidism Brother       Current Medications:     Current Outpatient Medications   Medication Sig Dispense Refill   • Cholecalciferol (Vitamin D) 50 MCG (2000 UT) tablet Take 2,000 Units by mouth daily     • Daridorexant HCl (Quviviq) 50 MG TABS Take 1 tablet by mouth daily at bedtime     • fluticasone (FLONASE) 50 mcg/act nasal spray instill 2 sprays into each nostril daily 32 g 1   • levothyroxine (Synthroid) 50 mcg tablet Take 1 tablet (50 mcg total) by mouth daily 90 "tablet 0   • metoprolol succinate (TOPROL-XL) 100 mg 24 hr tablet Take 1 tablet (100 mg total) by mouth daily 90 tablet 3   • omeprazole (PriLOSEC) 20 mg delayed release capsule Take 20 mg by mouth Monday, Wednesday, Friday  0   • paliperidone (INVEGA) 6 MG 24 hr tablet Take 6 mg by mouth daily       No current facility-administered medications for this visit  Allergies: Allergies   Allergen Reactions   • Prednisone      DEPRESSION      Physical Exam:     /78 (BP Location: Left arm, Patient Position: Sitting, Cuff Size: Standard)   Pulse 96   Temp 98 5 °F (36 9 °C) (Tympanic)   Ht 5' 4\" (1 626 m)   Wt 95 3 kg (210 lb 3 2 oz)   SpO2 96%   BMI 36 08 kg/m²     Physical Exam  Vitals and nursing note reviewed  Constitutional:       General: He is not in acute distress  Appearance: Normal appearance  He is well-developed  He is not ill-appearing, toxic-appearing or diaphoretic  HENT:      Head: Normocephalic and atraumatic  Nose: Nose normal       Mouth/Throat:      Mouth: Mucous membranes are moist       Pharynx: Oropharynx is clear  No oropharyngeal exudate or posterior oropharyngeal erythema  Eyes:      General: No scleral icterus  Right eye: No discharge  Left eye: No discharge  Extraocular Movements: Extraocular movements intact  Conjunctiva/sclera: Conjunctivae normal       Pupils: Pupils are equal, round, and reactive to light  Cardiovascular:      Rate and Rhythm: Normal rate and regular rhythm  Pulses: Normal pulses  Heart sounds: Normal heart sounds  No murmur heard  Pulmonary:      Effort: Pulmonary effort is normal  No respiratory distress  Breath sounds: Normal breath sounds  Abdominal:      General: There is no distension  Palpations: Abdomen is soft  There is no mass  Tenderness: There is no abdominal tenderness  There is no guarding or rebound  Hernia: No hernia is present     Musculoskeletal:         General: " Normal range of motion  Cervical back: Normal range of motion and neck supple  No rigidity or tenderness  Right lower leg: No edema  Left lower leg: No edema  Lymphadenopathy:      Cervical: No cervical adenopathy  Skin:     General: Skin is warm and dry  Neurological:      Mental Status: He is alert and oriented to person, place, and time  Mental status is at baseline  Cranial Nerves: No cranial nerve deficit  Psychiatric:         Mood and Affect: Mood normal          Behavior: Behavior normal          Thought Content:  Thought content normal          Judgment: Judgment normal           Beryl Sorensen MD   [facility]ACT[facility] AT [facility]

## 2023-08-29 ENCOUNTER — OFFICE VISIT (OUTPATIENT)
Dept: CARDIOLOGY CLINIC | Facility: CLINIC | Age: 28
End: 2023-08-29
Payer: COMMERCIAL

## 2023-08-29 VITALS
SYSTOLIC BLOOD PRESSURE: 130 MMHG | HEIGHT: 64 IN | DIASTOLIC BLOOD PRESSURE: 90 MMHG | WEIGHT: 209 LBS | HEART RATE: 78 BPM | BODY MASS INDEX: 35.68 KG/M2

## 2023-08-29 DIAGNOSIS — R00.0 TACHYCARDIA: Primary | ICD-10-CM

## 2023-08-29 DIAGNOSIS — I42.0 DILATED CARDIOMYOPATHY (HCC): ICD-10-CM

## 2023-08-29 DIAGNOSIS — R94.31 ABNORMAL EKG: ICD-10-CM

## 2023-08-29 PROCEDURE — 93000 ELECTROCARDIOGRAM COMPLETE: CPT | Performed by: INTERNAL MEDICINE

## 2023-08-29 PROCEDURE — 99214 OFFICE O/P EST MOD 30 MIN: CPT | Performed by: INTERNAL MEDICINE

## 2023-08-29 RX ORDER — METOPROLOL SUCCINATE 100 MG/1
100 TABLET, EXTENDED RELEASE ORAL DAILY
Qty: 90 TABLET | Refills: 5 | Status: SHIPPED | OUTPATIENT
Start: 2023-08-29

## 2023-08-29 RX ORDER — DARIDOREXANT 25 MG/1
TABLET, FILM COATED ORAL
COMMUNITY
Start: 2023-08-18

## 2023-08-29 NOTE — PROGRESS NOTES
Patient ID: Nelly Antoine is a 33 y.o. male. Plan:      Dilated cardiomyopathy ([address])  Minimal by prior echo. Perhaps even related to prior persistent tachycardia. Heart rate now much better. We will recheck an echo in 1 year. Abnormal EKG  Delta waves seen on the EKG but completely without symptoms. Tachycardia  Much improved either because of better mental health medication or because of metoprolol. Follow up Plan/Other summary comments:  Return in about 1 year (around 8/29/2024). HPI:   Patient seen in follow-up today regarding the above issues and in particular tachycardia. He is much better this year than last.  His mom attributes this to change in his psychoactive medication. No palpitations syncope or near syncope. Results for orders placed or performed in visit on 08/29/23   POCT ECG    Impression    Sinus rhythm at 81 bpm.  Evidence for preexcitation. Most recent or relevant cardiac/vascular testing:     Holter monitor 8/3/2019:  Heart rate varied between 60 and 138 beats per minute. TTE10/3/2019: Normal left ventricular size and function.  TTE 07/18/2022:  EF 50%. Past Surgical History:   Procedure Laterality Date   • DENTAL SURGERY     • TONSILLECTOMY AND ADENOIDECTOMY      Last assessed 7/8/2014       Lipid Profile: No results found for: "CHOL", "TRIG", "HDL", "LDL"      Review of Systems   10  point ROS  was otherwise non pertinent or negative except as per HPI or as below. Gait:  Normal.        Objective:     /90   Pulse 78   Ht 5' 4" (1.626 m)   Wt 94.8 kg (209 lb)   BMI 35.87 kg/m²     PHYSICAL EXAM:    General:  Normal appearance in no distress. Eyes:  Anicteric. Oral mucosa:  Moist.  Neck:  No JVD. Carotid upstrokes are brisk without bruits. No masses. Chest:  Clear to auscultation. Cardiac:  No palpable PMI. Normal S1 and S2. No murmur gallop or rub. Abdomen:  Soft and nontender.  No palpable organomegaly or aortic enlargement. Extremities:  No peripheral edema. Musculoskeletal:  Symmetric. Vascular:  Femoral pulses are brisk without bruits. Popliteal pulses are intact bilaterally. Pedal pulses are intact. Neuro:  Grossly symmetric. Psych:  Alert and oriented x3.         Current Outpatient Medications:   •  Cholecalciferol (Vitamin D) 50 MCG (2000 UT) tablet, Take 2,000 Units by mouth daily, Disp: , Rfl:   •  fluticasone (FLONASE) 50 mcg/act nasal spray, instill 2 sprays into each nostril daily, Disp: 32 g, Rfl: 1  •  levothyroxine (Synthroid) 50 mcg tablet, Take 1 tablet (50 mcg total) by mouth daily, Disp: 90 tablet, Rfl: 0  •  metoprolol succinate (TOPROL-XL) 100 mg 24 hr tablet, Take 1 tablet (100 mg total) by mouth daily, Disp: 90 tablet, Rfl: 5  •  omeprazole (PriLOSEC) 20 mg delayed release capsule, Take 20 mg by mouth daily, Disp: , Rfl: 0  •  paliperidone (INVEGA) 6 MG 24 hr tablet, Take 6 mg by mouth daily, Disp: , Rfl:   •  Quviviq 25 MG TABS, TAKE 1 TABLET BY MOUTH ONCE PER NIGHT WITHIN 30 MINUTES BEFORE BEDTIME AT LEAST 7 HOURS REMAINING PRIOR TO PLANNED AWAKENING, Disp: , Rfl:   •  Daridorexant HCl (Quviviq) 50 MG TABS, Take 1 tablet by mouth daily at bedtime (Patient not taking: Reported on 8/29/2023), Disp: , Rfl:   Allergies   Allergen Reactions   • Prednisone      DEPRESSION     Past Medical History:   Diagnosis Date   • Autism    • Dilated cardiomyopathy    • Mononucleosis    • Morbid obesity with BMI of 40.0-44.9, adult ([address]) 10/5/2016   • Schizophrenia ([address])    • Tachycardia 12/10/2018           Social History     Tobacco Use   Smoking Status Never   Smokeless Tobacco Never

## 2023-08-29 NOTE — ASSESSMENT & PLAN NOTE
Minimal by prior echo. Perhaps even related to prior persistent tachycardia. Heart rate now much better. We will recheck an echo in 1 year.

## 2023-09-28 DIAGNOSIS — E03.9 HYPOTHYROIDISM, UNSPECIFIED TYPE: ICD-10-CM

## 2023-09-28 RX ORDER — LEVOTHYROXINE SODIUM 0.05 MG/1
50 TABLET ORAL DAILY
Qty: 90 TABLET | Refills: 0 | Status: SHIPPED | OUTPATIENT
Start: 2023-09-28

## 2023-10-08 ENCOUNTER — OFFICE VISIT (OUTPATIENT)
Dept: URGENT CARE | Facility: CLINIC | Age: 28
End: 2023-10-08
Payer: COMMERCIAL

## 2023-10-08 VITALS
HEIGHT: 64 IN | TEMPERATURE: 98 F | BODY MASS INDEX: 36.88 KG/M2 | DIASTOLIC BLOOD PRESSURE: 58 MMHG | RESPIRATION RATE: 18 BRPM | WEIGHT: 216 LBS | HEART RATE: 85 BPM | OXYGEN SATURATION: 96 % | SYSTOLIC BLOOD PRESSURE: 110 MMHG

## 2023-10-08 DIAGNOSIS — H92.01 ACUTE OTALGIA, RIGHT: ICD-10-CM

## 2023-10-08 DIAGNOSIS — H61.23 BILATERAL IMPACTED CERUMEN: ICD-10-CM

## 2023-10-08 DIAGNOSIS — H60.553 ACUTE REACTIVE OTITIS EXTERNA OF BOTH EARS: Primary | ICD-10-CM

## 2023-10-08 PROCEDURE — 99214 OFFICE O/P EST MOD 30 MIN: CPT | Performed by: NURSE PRACTITIONER

## 2023-10-08 PROCEDURE — 69210 REMOVE IMPACTED EAR WAX UNI: CPT | Performed by: NURSE PRACTITIONER

## 2023-10-08 NOTE — PATIENT INSTRUCTIONS
You have had both ears cleaned of ear wax. You are to use the ear drops prescribed for you.   You are to follow up with your PCP maybe every 6 months for ear check  No ear buds or plugs x 10 days   Follow up with your PCP in 3-5 days   Go to the ED if symptoms worsen  Do NOT put anything in the ears - no qtips   Take tylenol or motirn for pain

## 2023-10-08 NOTE — PROGRESS NOTES
[facility]        NAME: Kelly Rivera is a 33 y.o. male  : 1995    MRN: [id number]  DATE: 2023  TIME: 12:52 PM    Assessment and Plan   Acute reactive otitis externa of both ears [H60.553]  1. Acute reactive otitis externa of both ears  neomycin-polymyxin-hydrocortisone (CORTISPORIN) otic solution      2. Acute otalgia, right  neomycin-polymyxin-hydrocortisone (CORTISPORIN) otic solution      3. Bilateral impacted cerumen              Patient Instructions       Follow up with PCP in 3-5 days. Proceed to  ER if symptoms worsen. You have had both ears cleaned of ear wax. You are to use the ear drops prescribed for you. You are to follow up with your PCP maybe every 6 months for ear check  No ear buds or plugs x 10 days   Follow up with your PCP in 3-5 days   Go to the ED if symptoms worsen  Do NOT put anything in the ears - no qtips   Take tylenol or motirn for pain          Chief Complaint     Chief Complaint   Patient presents with   • Earache     Started Friday with right ear ache. History of Present Illness       This is a 33year old male who states that on Friday he developed a right ear ache. He denies any fevers, chills, n/v/d, discharge from the ear, sorethroat or congestion. He states he does wear ear buds, mother states "all the time". He denies knowledge of ear wax. Review of Systems   Review of Systems   Constitutional: Negative. HENT: Positive for ear pain. Negative for ear discharge. Eyes: Negative. Respiratory: Negative. Cardiovascular: Negative. Gastrointestinal: Negative. Endocrine: Negative. Genitourinary: Negative. Musculoskeletal: Negative. Skin: Negative. Allergic/Immunologic: Negative. Neurological: Negative. Hematological: Negative. Psychiatric/Behavioral: Negative.           Current Medications       Current Outpatient Medications:   •  Cholecalciferol (Vitamin D) 50 MCG (2000 UT) tablet, Take 2,000 Units by mouth daily, Disp: , Rfl:   •  levothyroxine 50 mcg tablet, TAKE 1 TABLET BY MOUTH EVERY DAY, Disp: 90 tablet, Rfl: 0  •  metoprolol succinate (TOPROL-XL) 100 mg 24 hr tablet, Take 1 tablet (100 mg total) by mouth daily, Disp: 90 tablet, Rfl: 5  •  neomycin-polymyxin-hydrocortisone (CORTISPORIN) otic solution, Administer 3 drops into both ears every 6 (six) hours for 10 days, Disp: 10 mL, Rfl: 0  •  omeprazole (PriLOSEC) 20 mg delayed release capsule, Take 20 mg by mouth daily, Disp: , Rfl: 0  •  paliperidone (INVEGA) 6 MG 24 hr tablet, Take 6 mg by mouth daily, Disp: , Rfl:   •  Quviviq 25 MG TABS, TAKE 1 TABLET BY MOUTH ONCE PER NIGHT WITHIN 30 MINUTES BEFORE BEDTIME AT LEAST 7 HOURS REMAINING PRIOR TO PLANNED AWAKENING, Disp: , Rfl:   •  Daridorexant HCl (Quviviq) 50 MG TABS, Take 1 tablet by mouth daily at bedtime (Patient not taking: Reported on 8/29/2023), Disp: , Rfl:   •  fluticasone (FLONASE) 50 mcg/act nasal spray, instill 2 sprays into each nostril daily (Patient not taking: Reported on 10/8/2023), Disp: 32 g, Rfl: 1    Current Allergies     Allergies as of 10/08/2023 - Reviewed 10/08/2023   Allergen Reaction Noted   • Prednisone  02/23/2015            The following portions of the patient's history were reviewed and updated as appropriate: allergies, current medications, past family history, past medical history, past social history, past surgical history and problem list.     Past Medical History:   Diagnosis Date   • Autism    • Dilated cardiomyopathy    • Mononucleosis    • Morbid obesity with BMI of 40.0-44.9, adult ([address]) 10/5/2016   • Schizophrenia ([address])    • Tachycardia 12/10/2018       Past Surgical History:   Procedure Laterality Date   • DENTAL SURGERY     • TONSILLECTOMY AND ADENOIDECTOMY      Last assessed 7/8/2014       Family History   Problem Relation Age of Onset   • Hypothyroidism Father    • Cancer Family    • Diabetes Family    • Hyperlipidemia Family    • Stroke Family    • Obesity Mother    • Hypothyroidism Mother    • Heart disease Maternal Grandfather    • Hypothyroidism Brother          Medications have been verified. Objective   /58   Pulse 85   Temp 98 °F (36.7 °C)   Resp 18   Ht 5' 4" (1.626 m)   Wt 98 kg (216 lb)   SpO2 96%   BMI 37.08 kg/m²   No LMP for male patient. Physical Exam     Physical Exam  Vitals and nursing note reviewed. Constitutional:       General: He is not in acute distress. Appearance: Normal appearance. He is obese. He is not ill-appearing, toxic-appearing or diaphoretic. HENT:      Head: Normocephalic and atraumatic. Right Ear: There is impacted cerumen. Left Ear: There is impacted cerumen. Nose: Nose normal. No congestion or rhinorrhea. Mouth/Throat:      Mouth: Mucous membranes are moist.      Pharynx: Oropharynx is clear. No oropharyngeal exudate or posterior oropharyngeal erythema. Eyes:      Extraocular Movements: Extraocular movements intact. Cardiovascular:      Rate and Rhythm: Normal rate. Pulses: Normal pulses. Pulmonary:      Effort: Pulmonary effort is normal.   Musculoskeletal:         General: Normal range of motion. Cervical back: Normal range of motion. Skin:     General: Skin is warm and dry. Capillary Refill: Capillary refill takes less than 2 seconds. Neurological:      General: No focal deficit present. Mental Status: He is alert and oriented to person, place, and time. Psychiatric:         Mood and Affect: Mood normal.         Behavior: Behavior normal.         Thought Content: Thought content normal.         Judgment: Judgment normal.         Ear cerumen removal    Date/Time: 10/8/2023 12:10 PM    Performed by: JIMENEZ Vidales  Authorized by: JIMENEZ Vidales  Universal Protocol:  Consent: The procedure was performed in an emergent situation. Verbal consent obtained. Written consent obtained.   Risks and benefits: risks, benefits and alternatives were discussed  Consent given by: patient  Time out: Immediately prior to procedure a "time out" was called to verify the correct patient, procedure, equipment, support staff and site/side marked as required. Timeout called at: 10/8/2023 12:10 PM.  Patient understanding: patient states understanding of the procedure being performed  Patient consent: the patient's understanding of the procedure matches consent given  Procedure consent: procedure consent matches procedure scheduled  Relevant documents: relevant documents present and verified  Test results: test results available and properly labeled  Site marked: the operative site was marked  Required items: required blood products, implants, devices, and special equipment available  Patient identity confirmed: verbally with patient and hospital-assigned identification number      Patient location:  Clinic  Procedure details:     Local anesthetic:  None    Location:  L ear and R ear    Procedure type: irrigation with instrumentation      Instrumentation: curette      Approach:  External    Visualization (free text): B/L black impacted cerumen     Equipment used:  Flush water bottle, H202, curette   Post-procedure details:     Complication:  None    Hearing quality:  Improved    Patient tolerance of procedure: Tolerated well, no immediate complications  Comments:      Copious amounts of cerumen removed.

## 2023-10-11 ENCOUNTER — HOSPITAL ENCOUNTER (EMERGENCY)
Facility: HOSPITAL | Age: 28
Discharge: HOME | End: 2023-10-11
Attending: EMERGENCY MEDICINE
Payer: COMMERCIAL

## 2023-10-11 VITALS
DIASTOLIC BLOOD PRESSURE: 67 MMHG | SYSTOLIC BLOOD PRESSURE: 123 MMHG | BODY MASS INDEX: 37.59 KG/M2 | WEIGHT: 219 LBS | HEART RATE: 74 BPM | RESPIRATION RATE: 16 BRPM | OXYGEN SATURATION: 98 %

## 2023-10-11 DIAGNOSIS — H92.01 RIGHT EAR PAIN: Primary | ICD-10-CM

## 2023-10-11 PROCEDURE — 99284 EMERGENCY DEPT VISIT MOD MDM: CPT | Performed by: EMERGENCY MEDICINE

## 2023-10-11 PROCEDURE — 99284 EMERGENCY DEPT VISIT MOD MDM: CPT

## 2023-10-11 RX ORDER — IBUPROFEN 400 MG/1
400 TABLET ORAL ONCE
Status: COMPLETED | OUTPATIENT
Start: 2023-10-11 | End: 2023-10-11

## 2023-10-11 RX ADMIN — IBUPROFEN 400 MG: 400 TABLET, FILM COATED ORAL at 18:28

## 2023-10-11 NOTE — DISCHARGE INSTRUCTIONS
Continue using drops prescribed. Follow up with PCP tomorrow as planned. Tylenol 650 mg every 8 hours, Motrin 600 mg every 6 hours for pain control. Avoid water exposure, placing anything inside the ear. Return to the ED with any new/concerning issues.

## 2023-10-11 NOTE — ED ATTENDING ATTESTATION
10/11/2023  Arabella Montilla DO, saw and evaluated the patient. I have discussed the patient with the resident/non-physician practitioner and agree with the resident's/non-physician practitioner's findings, Plan of Care, and MDM as documented in the resident's/non-physician practitioner's note, except where noted. All available labs and Radiology studies were reviewed. I was present for key portions of any procedure(s) performed by the resident/non-physician practitioner and I was immediately available to provide assistance. At this point I agree with the current assessment done in the Emergency Department. I have conducted an independent evaluation of this patient a history and physical is as follows:    Patient is a 81-year-old male who presents to the emergency department complaining of jaw pain and pain with eating. The patient notes that he just had his ears irrigated for cerumen. The patient notes he was doing well but started getting more pain in his ears with opening and closing his mouth. Patient denies any fever or chills. The patient is here for evaluation and has an appointment with his doctor tomorrow. Heart is regular rate rhythm without clicks rubs gallops and lungs are clear. The patient's bilateral external auditory canals are clear without any evidence of cerumen. There is not appear to be any evidence of trauma to the tympanic membrane. The patient's oropharynx is clear. Patient was told that there is no evidence of infection at this time and his pain may be due to the fact that he had his ears recently irrigated in the [place] in close proximity to the TMJ joint. Patient notes that steroids induced depression so patient was told to utilize Motrin for now and follow-up with his family doctor tomorrow.     ED Course         Critical Care Time  Procedures

## 2023-10-11 NOTE — ED PROVIDER NOTES
History  Chief Complaint   Patient presents with    Earache     Right ear pain. Patient on antibiotics however are ineffective     HPI    Patient is a 33year old male presenting to the ED for evaluation of R ear and R jaw pain for the past few days. Mother at bedside states that patient was at PCP office 3 days ago for bilateral cerumen removal. Afterward, patient notes worsening soreness in the R ear. Patient was placed on Cortisporin drops after the cerumen removal. Patient has been taking Tylenol intermittently for his pain. He feels that the pain is starting to track down into his R jaw. Reports pain with chewing but no severe difficulty eating, drinking, or swallowing. Patient denies change in hearing. Mom states that he frequently wears ear buds for music, but not since the cleaning. No fevers, chills, cough, congestion, headache, neck pain, chest pain, difficulty breathing, abdominal pain, vomiting. Prior to Admission Medications   Prescriptions Last Dose Informant Patient Reported? Taking?    Cholecalciferol (Vitamin D) 50 MCG (2000 UT) tablet  Mother Yes No   Sig: Take 2,000 Units by mouth daily   Daridorexant HCl (Quviviq) 50 MG TABS  Mother Yes No   Sig: Take 1 tablet by mouth daily at bedtime   Patient not taking: Reported on 8/29/2023   Quviviq 25 MG TABS   Yes No   Sig: TAKE 1 TABLET BY MOUTH ONCE PER NIGHT WITHIN 30 MINUTES BEFORE BEDTIME AT LEAST 7 HOURS REMAINING PRIOR TO PLANNED AWAKENING   fluticasone (FLONASE) 50 mcg/act nasal spray  Mother No No   Sig: instill 2 sprays into each nostril daily   Patient not taking: Reported on 10/8/2023   levothyroxine 50 mcg tablet   No No   Sig: TAKE 1 TABLET BY MOUTH EVERY DAY   metoprolol succinate (TOPROL-XL) 100 mg 24 hr tablet   No No   Sig: Take 1 tablet (100 mg total) by mouth daily   neomycin-polymyxin-hydrocortisone (CORTISPORIN) otic solution   No No   Sig: Administer 3 drops into both ears every 6 (six) hours for 10 days   omeprazole (PriLOSEC) 20 mg delayed release capsule  Mother Yes No   Sig: Take 20 mg by mouth daily   paliperidone (INVEGA) 6 MG 24 hr tablet  Mother Yes No   Sig: Take 6 mg by mouth daily      Facility-Administered Medications: None       Past Medical History:   Diagnosis Date    Autism     Dilated cardiomyopathy     Mononucleosis     Morbid obesity with BMI of 40.0-44.9, adult ([address]) 10/5/2016    Schizophrenia ([address])     Tachycardia 12/10/2018       Past Surgical History:   Procedure Laterality Date    DENTAL SURGERY      TONSILLECTOMY AND ADENOIDECTOMY      Last assessed 7/8/2014       Family History   Problem Relation Age of Onset    Hypothyroidism Father     Cancer Family     Diabetes Family     Hyperlipidemia Family     Stroke Family     Obesity Mother     Hypothyroidism Mother     Heart disease Maternal Grandfather     Hypothyroidism Brother      I have reviewed and agree with the history as documented. E-Cigarette/Vaping    E-Cigarette Use Never User      E-Cigarette/Vaping Substances    Nicotine No     THC No     CBD No     Flavoring No     Other No     Unknown No      Social History     Tobacco Use    Smoking status: Never    Smokeless tobacco: Never   Vaping Use    Vaping Use: Never used   Substance Use Topics    Alcohol use: No    Drug use: No        Review of Systems   All other systems reviewed and are negative. Physical Exam  ED Triage Vitals   Temp Pulse Respirations Blood Pressure SpO2   -- 10/11/23 1753 10/11/23 1752 10/11/23 1753 10/11/23 1752    74 16 123/67 98 %      Temp src Heart Rate Source Patient Position - Orthostatic VS BP Location FiO2 (%)   -- 10/11/23 1752 10/11/23 1752 10/11/23 1752 --    Monitor Sitting Right arm       Pain Score       10/11/23 1752       10 - Worst Possible Pain             Orthostatic Vital Signs  Vitals:    10/11/23 1752 10/11/23 1753   BP:  123/67   Pulse:  74   Patient Position - Orthostatic VS: Sitting Sitting       Physical Exam  Vitals and nursing note reviewed. Constitutional:       General: He is not in acute distress. Appearance: Normal appearance. He is well-developed and normal weight. He is not ill-appearing or toxic-appearing. HENT:      Head: Normocephalic and atraumatic. Comments: No trismus; swallowing is normal       Right Ear: Tympanic membrane and external ear normal. There is no impacted cerumen. Left Ear: Tympanic membrane, ear canal and external ear normal. There is no impacted cerumen. Ears:      Comments: Slight irritation of the R ear canal, but no swelling or exudates. No TM perforations. There is no erythema, swelling, or tenderness of the ear cartilage externally. No mastoid tenderness. Nose: Nose normal. No congestion. Mouth/Throat:      Mouth: Mucous membranes are moist.      Pharynx: Oropharynx is clear. No oropharyngeal exudate or posterior oropharyngeal erythema. Eyes:      Conjunctiva/sclera: Conjunctivae normal.      Pupils: Pupils are equal, round, and reactive to light. Cardiovascular:      Rate and Rhythm: Normal rate and regular rhythm. Pulses: Normal pulses. Heart sounds: Normal heart sounds. No murmur heard. Pulmonary:      Effort: Pulmonary effort is normal. No respiratory distress. Breath sounds: Normal breath sounds. No wheezing, rhonchi or rales. Abdominal:      General: Abdomen is flat. Palpations: Abdomen is soft. Musculoskeletal:         General: No swelling. Normal range of motion. Cervical back: Normal range of motion and neck supple. No tenderness. Lymphadenopathy:      Cervical: No cervical adenopathy. Skin:     General: Skin is warm and dry. Capillary Refill: Capillary refill takes less than 2 seconds. Neurological:      General: No focal deficit present. Mental Status: He is alert and oriented to person, place, and time.    Psychiatric:         Mood and Affect: Mood normal.         ED Medications  Medications   ibuprofen (MOTRIN) tablet 400 mg (has no administration in time range)       Diagnostic Studies  Results Reviewed       None                   No orders to display         Procedures  Procedures      ED Course       Patient is a 33year old male presenting to the ED for R ear pain for the past few days, after bilateral cerumen removal by PCP. Patient was placed on Cortisporin drops for irritation. Has been using these, as well as Tylenol intermittently, but experiencing worsening aching in his R ear that is beginning to travel to the R jaw. Patient does not have any signs of mastoiditis, TM perforation, otitis media, or any jaw claudication, or trismus. Believe that his canal is mildly irritated which is the source of his discomfort. Will provide Motrin in the ED. Patient took Tylenol prior to arrival. Mom states that patient has a follow up appointment with PCP tomorrow; advised to keep that appointment. Also advised to continue using Cortisporin drops until he can be seen by PCP tomorrow. Patient is otherwise non-toxic appearing, afebrile. Stable for discharge. I gave oral return precautions for what to return for in addition to the written return precautions. The patient verbalized understanding of the discharge instructions and warnings that would necessitate return to the Emergency Department. I specifically highlighted areas of special concern regarding the written and verbal discharge instructions and return precautions. All questions were answered prior to discharge. Medical Decision Making  Risk  Prescription drug management.           Disposition  Final diagnoses:   Right ear pain     Time reflects when diagnosis was documented in both MDM as applicable and the Disposition within this note       Time User Action Codes Description Comment    10/11/2023  6:06 PM Aaron Roman Add [H92.01] Right ear pain           ED Disposition       ED Disposition   Discharge    Condition   Stable    Date/Time   Wed Oct 11, 2023  6:06 PM    Comment   Chon Paige discharge to home/self care. Follow-up Information       Follow up With Specialties Details Why Contact Info    Heather Shaw MD [facility] Schedule an appointment as soon as possible for a visit  As needed [address] [place], [place] [address] [facility]  [phone]              Patient's Medications   Discharge Prescriptions    No medications on file     No discharge procedures on file. PDMP Review       None             ED Provider  Attending physically available and evaluated Chon Paige. I managed the patient along with the ED Attending.     Electronically Signed by           Trisha Bar DO  10/11/23 [id number]

## 2023-10-12 ENCOUNTER — OFFICE VISIT (OUTPATIENT)
Dept: FAMILY MEDICINE CLINIC | Facility: CLINIC | Age: 28
End: 2023-10-12
Payer: COMMERCIAL

## 2023-10-12 VITALS
DIASTOLIC BLOOD PRESSURE: 82 MMHG | HEART RATE: 86 BPM | TEMPERATURE: 98.6 F | SYSTOLIC BLOOD PRESSURE: 128 MMHG | WEIGHT: 212.6 LBS | OXYGEN SATURATION: 97 % | BODY MASS INDEX: 36.29 KG/M2 | HEIGHT: 64 IN

## 2023-10-12 DIAGNOSIS — F84.0 AUTISTIC DISORDER, ACTIVE: Primary | Chronic | ICD-10-CM

## 2023-10-12 DIAGNOSIS — S03.00XA DISLOCATION OF TEMPOROMANDIBULAR JOINT, INITIAL ENCOUNTER: ICD-10-CM

## 2023-10-12 PROCEDURE — 99214 OFFICE O/P EST MOD 30 MIN: CPT | Performed by: FAMILY MEDICINE

## 2023-10-12 NOTE — PROGRESS NOTES
Name: Yesica Shaw      : 1995      MRN: [id number]  Encounter Provider: Dandre Garcia MD  Encounter Date: 10/12/2023   Encounter department: [address] [place] [address]     1. Autistic disorder, active    2. Dislocation of temporomandibular joint, initial encounter      Ear exam unremarkable. There is some right ear canal redness but no signs of otitis externa. Redness likely secondary to irritation from cerumen disimpaction. Pain likely TMJ. Had full in-depth discussion with mom and dad and patient about pathophysiology of TMJ and its course as well as treatment options. Recommended soft diet, ice and OTC treatment for now. May do physical therapy if no improvement in 2 weeks. Stop Corticosporin drops. May resume if symptoms worsen. Subjective      Presents to the office with mom and dad. Has history of autistic disorder. Mom and dad are primary historians as well as patient contribution. Started having ear pain on . Went to urgent care. They found his ears were blocked and they cleaned his ears. They gave him some drops as well. Symptoms got worse and he went to emergency room. They recommended follow-up with PCP. Reports he has right ear pain. No fever or chills. Pain is at the front of the ear. Worse with chewi  Mom and dad report he has not been eating anything for 2 days. No fevers    Earache       Review of Systems   HENT:  Positive for ear pain. All other systems reviewed and are negative.       Current Outpatient Medications on File Prior to Visit   Medication Sig    Cholecalciferol (Vitamin D) 50 MCG ( UT) tablet Take 2,000 Units by mouth daily    fluticasone (FLONASE) 50 mcg/act nasal spray instill 2 sprays into each nostril daily    levothyroxine 50 mcg tablet TAKE 1 TABLET BY MOUTH EVERY DAY    metoprolol succinate (TOPROL-XL) 100 mg 24 hr tablet Take 1 tablet (100 mg total) by mouth daily neomycin-polymyxin-hydrocortisone (CORTISPORIN) otic solution Administer 3 drops into both ears every 6 (six) hours for 10 days    omeprazole (PriLOSEC) 20 mg delayed release capsule Take 20 mg by mouth daily    paliperidone (INVEGA) 6 MG 24 hr tablet Take 6 mg by mouth daily    Quviviq 25 MG TABS TAKE 1 TABLET BY MOUTH ONCE PER NIGHT WITHIN 30 MINUTES BEFORE BEDTIME AT LEAST 7 HOURS REMAINING PRIOR TO PLANNED AWAKENING    Daridorexant HCl (Quviviq) 50 MG TABS Take 1 tablet by mouth daily at bedtime (Patient not taking: Reported on 8/29/2023)       Objective     /82 (BP Location: Left arm, Patient Position: Sitting, Cuff Size: Standard)   Pulse 86   Temp 98.6 °F (37 °C) (Tympanic)   Ht 5' 4" (1.626 m)   Wt 96.4 kg (212 lb 9.6 oz)   SpO2 97%   BMI 36.49 kg/m²     Physical Exam  Vitals and nursing note reviewed. Constitutional:       General: He is not in acute distress. Appearance: Normal appearance. He is not ill-appearing, toxic-appearing or diaphoretic. HENT:      Right Ear: Ear canal and external ear normal. There is no impacted cerumen. Left Ear: Tympanic membrane, ear canal and external ear normal. There is no impacted cerumen. Eyes:      General:         Right eye: No discharge. Left eye: No discharge. Extraocular Movements: Extraocular movements intact. Conjunctiva/sclera: Conjunctivae normal.   Cardiovascular:      Rate and Rhythm: Normal rate. Pulmonary:      Effort: Pulmonary effort is normal.   Musculoskeletal:      Cervical back: Normal range of motion and neck supple. Neurological:      Mental Status: He is alert and oriented to person, place, and time. Psychiatric:         Mood and Affect: Mood normal.         Behavior: Behavior normal.         Thought Content:  Thought content normal.         Judgment: Judgment normal.       Sridhar Ac MD  I have spent a total time of 30 minutes on 10/12/23 in caring for this patient including Diagnostic results, Prognosis, Risks and benefits of tx options, Instructions for management, Patient and family education, Risk factor reductions, Impressions, Documenting in the medical record, Reviewing / ordering tests, medicine, procedures  , and Obtaining or reviewing history  .

## 2023-10-20 DIAGNOSIS — E03.9 HYPOTHYROIDISM, UNSPECIFIED TYPE: ICD-10-CM

## 2023-10-20 RX ORDER — LEVOTHYROXINE SODIUM 0.05 MG/1
50 TABLET ORAL DAILY
Qty: 90 TABLET | Refills: 0 | Status: SHIPPED | OUTPATIENT
Start: 2023-10-20

## 2023-10-20 NOTE — TELEPHONE ENCOUNTER
Requested medication(s) are due for refill today: No  Patient has already received a courtesy refill: No  Other reason request has been forwarded to provider: med was requested too soon, should not be due until 12/8/23

## 2023-11-09 ENCOUNTER — OFFICE VISIT (OUTPATIENT)
Dept: FAMILY MEDICINE CLINIC | Facility: CLINIC | Age: 28
End: 2023-11-09
Payer: COMMERCIAL

## 2023-11-09 VITALS
HEART RATE: 111 BPM | WEIGHT: 213 LBS | BODY MASS INDEX: 36.37 KG/M2 | DIASTOLIC BLOOD PRESSURE: 80 MMHG | OXYGEN SATURATION: 87 % | HEIGHT: 64 IN | TEMPERATURE: 98.9 F | SYSTOLIC BLOOD PRESSURE: 130 MMHG

## 2023-11-09 DIAGNOSIS — E03.9 HYPOTHYROIDISM, UNSPECIFIED TYPE: Primary | ICD-10-CM

## 2023-11-09 DIAGNOSIS — J01.90 ACUTE SINUSITIS, RECURRENCE NOT SPECIFIED, UNSPECIFIED LOCATION: ICD-10-CM

## 2023-11-09 DIAGNOSIS — I42.0 DILATED CARDIOMYOPATHY (HCC): ICD-10-CM

## 2023-11-09 PROCEDURE — 99214 OFFICE O/P EST MOD 30 MIN: CPT | Performed by: FAMILY MEDICINE

## 2023-11-09 RX ORDER — FLUTICASONE PROPIONATE 50 MCG
2 SPRAY, SUSPENSION (ML) NASAL DAILY
Qty: 32 G | Refills: 1 | Status: SHIPPED | OUTPATIENT
Start: 2023-11-09

## 2023-11-09 NOTE — PROGRESS NOTES
Name: Mago Avelar      : 1995      MRN: [id number]  Encounter Provider: Gee Elias MD  Encounter Date: 2023   Encounter department: FAMILY [facility]ACTICE AT [address] [place] [address]     1. Hypothyroidism, unspecified type    2. Acute sinusitis, recurrence not specified, unspecified location  -     fluticasone (FLONASE) 50 mcg/act nasal spray; 2 sprays into each nostril daily    3. Dilated cardiomyopathy (HCC)    Hypothyroidism stable. Continue levothyroxine. Hypertension controlled. Cardiomyopathy stable. Continue metoprolol. URI symptoms today likely viral.  Symptoms are improving. No fevers. Continue supportive care. Flonase sent to pharmacy. Follow-up in 6 months for annual.       Subjective      Presents to the office for 6-month follow-up. Acute concerns for congestion and sore throat. Has had symptoms for about a week now and it is getting better. No fevers. Has history of intellectual disability as well as cardiomyopathy and hypothyroidism. Mom reports that his problems are stable and patient does not have any other concerns today. Tolerating medications well no side effects. Review of Systems   All other systems reviewed and are negative.       Current Outpatient Medications on File Prior to Visit   Medication Sig    Cholecalciferol (Vitamin D) 50 MCG ( UT) tablet Take 2,000 Units by mouth daily    levothyroxine 50 mcg tablet TAKE 1 TABLET BY MOUTH EVERY DAY    metoprolol succinate (TOPROL-XL) 100 mg 24 hr tablet Take 1 tablet (100 mg total) by mouth daily    omeprazole (PriLOSEC) 20 mg delayed release capsule Take 20 mg by mouth daily    paliperidone (INVEGA) 6 MG 24 hr tablet Take 6 mg by mouth daily    Quviviq 25 MG TABS TAKE 1 TABLET BY MOUTH ONCE PER NIGHT WITHIN 30 MINUTES BEFORE BEDTIME AT LEAST 7 HOURS REMAINING PRIOR TO PLANNED AWAKENING    [DISCONTINUED] fluticasone (FLONASE) 50 mcg/act nasal spray instill 2 sprays into each nostril daily Daridorexant HCl (Quviviq) 50 MG TABS Take 1 tablet by mouth daily at bedtime (Patient not taking: Reported on 11/9/2023)    neomycin-polymyxin-hydrocortisone (CORTISPORIN) otic solution Administer 3 drops into both ears every 6 (six) hours for 10 days (Patient not taking: Reported on 11/9/2023)       Objective     /80 (BP Location: Left arm, Patient Position: Sitting, Cuff Size: Standard)   Pulse (!) 111   Temp 98.9 °F (37.2 °C) (Tympanic)   Ht 5' 4" (1.626 m)   Wt 96.6 kg (213 lb)   SpO2 (!) 87%   BMI 36.56 kg/m²     Physical Exam  Vitals and nursing note reviewed. Constitutional:       General: He is not in acute distress. Appearance: Normal appearance. He is not ill-appearing, toxic-appearing or diaphoretic. HENT:      Nose: Congestion present. Mouth/Throat:      Mouth: Mucous membranes are moist.      Pharynx: Oropharynx is clear. No oropharyngeal exudate or posterior oropharyngeal erythema. Eyes:      General:         Right eye: No discharge. Left eye: No discharge. Extraocular Movements: Extraocular movements intact. Conjunctiva/sclera: Conjunctivae normal.   Cardiovascular:      Rate and Rhythm: Normal rate and regular rhythm. Pulses: Normal pulses. Heart sounds: Normal heart sounds. Pulmonary:      Effort: Pulmonary effort is normal.      Breath sounds: Normal breath sounds. Musculoskeletal:      Cervical back: Normal range of motion and neck supple. No rigidity or tenderness. Lymphadenopathy:      Cervical: No cervical adenopathy. Neurological:      Mental Status: He is alert and oriented to person, place, and time. Mental status is at baseline. Psychiatric:         Mood and Affect: Mood normal.         Behavior: Behavior normal.         Thought Content:  Thought content normal.         Judgment: Judgment normal.       Chen Clayton MD

## 2023-12-18 ENCOUNTER — OFFICE VISIT (OUTPATIENT)
Dept: FAMILY MEDICINE CLINIC | Facility: CLINIC | Age: 28
End: 2023-12-18
Payer: COMMERCIAL

## 2023-12-18 VITALS
BODY MASS INDEX: 36.19 KG/M2 | OXYGEN SATURATION: 96 % | TEMPERATURE: 98.5 F | HEART RATE: 101 BPM | WEIGHT: 212 LBS | HEIGHT: 64 IN | DIASTOLIC BLOOD PRESSURE: 80 MMHG | SYSTOLIC BLOOD PRESSURE: 110 MMHG

## 2023-12-18 DIAGNOSIS — F84.0 AUTISTIC DISORDER, ACTIVE: Chronic | ICD-10-CM

## 2023-12-18 DIAGNOSIS — K29.50 CHRONIC GASTRITIS WITHOUT BLEEDING, UNSPECIFIED GASTRITIS TYPE: Primary | ICD-10-CM

## 2023-12-18 PROCEDURE — 99214 OFFICE O/P EST MOD 30 MIN: CPT | Performed by: FAMILY MEDICINE

## 2023-12-18 RX ORDER — SUCRALFATE 1 G/1
1 TABLET ORAL 2 TIMES DAILY PRN
Qty: 30 TABLET | Refills: 0 | Status: SHIPPED | OUTPATIENT
Start: 2023-12-18 | End: 2023-12-28

## 2023-12-18 RX ORDER — OMEPRAZOLE 40 MG/1
40 CAPSULE, DELAYED RELEASE ORAL DAILY
Qty: 90 CAPSULE | Refills: 0 | Status: SHIPPED | OUTPATIENT
Start: 2023-12-18

## 2023-12-18 NOTE — PROGRESS NOTES
"Name: Lazaro Rogers      : 1995      MRN: [id number]  Encounter Provider: Jaylen Rader MD  Encounter Date: 2023   Encounter department: [facility] AT [facility]    Assessment & Plan     1. Chronic gastritis without bleeding, unspecified gastritis type  -     omeprazole (PriLOSEC) 40 MG capsule; Take 1 capsule (40 mg total) by mouth daily  -     sucralfate (CARAFATE) 1 g tablet; Take 1 tablet (1 g total) by mouth 2 (two) times a day as needed (abdominal pain)    2. Autistic disorder, active    Symptoms likely GERD and gastritis.  Had abdominal ultrasound earlier this year which showed hepatic steatosis and 5 mm gallbladder polyp.  Exam today benign.   His common triggers are spicy foods, wings and pizza.  Advised dietary modification.  Increase omeprazole from 20 mg to 40 mg daily.  Trial Carafate as as needed.    History provided by both mom and patient today.       Subjective      28-year-old with history of autism here with mom today for abdominal pain.  Symptoms have been going on for a while.  He says a few years.  He feels his abdomen \" beating\" he is not sure what that is from an he is worried.  Foods like pizza and wings make it worse.  He also noticed that spicy food makes it worse as well.  He is on omeprazole 20 mg daily.  That helps sometimes with the symptoms.  No nausea or vomiting.  No diarrhea.  No blood in stool.  No fever or chills.      Review of Systems   All other systems reviewed and are negative.      Current Outpatient Medications on File Prior to Visit   Medication Sig    Cholecalciferol (Vitamin D) 50 MCG (2000) tablet Take 2,000 Units by mouth daily    fluticasone (FLONASE) 50 mcg/act nasal spray 2 sprays into each nostril daily    levothyroxine 50 mcg tablet TAKE 1 TABLET BY MOUTH EVERY DAY    metoprolol succinate (TOPROL-XL) 100 mg 24 hr tablet Take 1 tablet (100 mg total) by mouth daily    paliperidone (INVEGA) 6 MG 24 hr tablet Take 6 mg by mouth daily " "   Quviviq 25 MG TABS TAKE 1 TABLET BY MOUTH ONCE PER NIGHT WITHIN 30 MINUTES BEFORE BEDTIME AT LEAST 7 HOURS REMAINING PRIOR TO PLANNED AWAKENING    [DISCONTINUED] omeprazole (PriLOSEC) 20 mg delayed release capsule Take 20 mg by mouth daily    Daridorexant HCl (Quviviq) 50 MG TABS Take 1 tablet by mouth daily at bedtime (Patient not taking: Reported on 11/9/2023)    neomycin-polymyxin-hydrocortisone (CORTISPORIN) otic solution Administer 3 drops into both ears every 6 (six) hours for 10 days (Patient not taking: Reported on 11/9/2023)       Objective     /80 (BP Location: Left arm, Patient Position: Sitting, Cuff Size: Standard)   Pulse 101   Temp 98.5 °F (36.9 °C) (Tympanic)   Ht 5' 4\" (1.626 m)   Wt 96.2 kg (212 lb)   SpO2 96%   BMI 36.39 kg/m²     Physical Exam  Vitals and nursing note reviewed.   Constitutional:       General: He is not in acute distress.     Appearance: Normal appearance. He is not ill-appearing, toxic-appearing or diaphoretic.   Eyes:      General:         Right eye: No discharge.         Left eye: No discharge.      Extraocular Movements: Extraocular movements intact.      Conjunctiva/sclera: Conjunctivae normal.   Cardiovascular:      Rate and Rhythm: Normal rate and regular rhythm.      Pulses: Normal pulses.      Heart sounds: Normal heart sounds.   Pulmonary:      Effort: Pulmonary effort is normal.      Breath sounds: Normal breath sounds.   Abdominal:      General: Bowel sounds are normal. There is no distension.      Palpations: Abdomen is soft. There is no mass.      Tenderness: There is no abdominal tenderness. There is no right CVA tenderness, left CVA tenderness, guarding or rebound.      Hernia: No hernia is present.   Musculoskeletal:      Cervical back: Normal range of motion and neck supple.   Neurological:      Mental Status: He is alert and oriented to person, place, and time. Mental status is at baseline.   Psychiatric:         Mood and Affect: Mood normal.    "      Behavior: Behavior normal.         Thought Content: Thought content normal.         Judgment: Judgment normal.       Jaylen Rader MD

## 2023-12-28 DIAGNOSIS — K29.50 CHRONIC GASTRITIS WITHOUT BLEEDING, UNSPECIFIED GASTRITIS TYPE: ICD-10-CM

## 2023-12-28 RX ORDER — SUCRALFATE 1 G/1
TABLET ORAL
Qty: 30 TABLET | Refills: 0 | Status: SHIPPED | OUTPATIENT
Start: 2023-12-28

## 2023-12-28 NOTE — TELEPHONE ENCOUNTER
Requested medication(s) are due for refill today: No  Patient has already received a courtesy refill: No  Other reason request has been forwarded to provider: med was signed on 12/18

## 2024-01-10 DIAGNOSIS — K29.50 CHRONIC GASTRITIS WITHOUT BLEEDING, UNSPECIFIED GASTRITIS TYPE: ICD-10-CM

## 2024-01-10 RX ORDER — SUCRALFATE 1 G/1
TABLET ORAL
Qty: 30 TABLET | Refills: 2 | Status: SHIPPED | OUTPATIENT
Start: 2024-01-10

## 2024-03-04 DIAGNOSIS — K29.50 CHRONIC GASTRITIS WITHOUT BLEEDING, UNSPECIFIED GASTRITIS TYPE: ICD-10-CM

## 2024-03-05 RX ORDER — OMEPRAZOLE 40 MG/1
40 CAPSULE, DELAYED RELEASE ORAL DAILY
Qty: 90 CAPSULE | Refills: 1 | Status: SHIPPED | OUTPATIENT
Start: 2024-03-05

## 2024-03-22 DIAGNOSIS — E03.9 HYPOTHYROIDISM, UNSPECIFIED TYPE: ICD-10-CM

## 2024-03-22 RX ORDER — LEVOTHYROXINE SODIUM 0.05 MG/1
50 TABLET ORAL DAILY
Qty: 30 TABLET | Refills: 0 | Status: SHIPPED | OUTPATIENT
Start: 2024-03-22

## 2024-03-23 DIAGNOSIS — E03.9 HYPOTHYROIDISM, UNSPECIFIED TYPE: Primary | ICD-10-CM

## 2024-03-23 NOTE — TELEPHONE ENCOUNTER
Please call patient let him know he is overdue for lab work.  I have placed new labs for him.  I would like him to get them done a few days prior to his next appointment on May 9.  Thank you.

## 2024-04-15 DIAGNOSIS — E03.9 HYPOTHYROIDISM, UNSPECIFIED TYPE: ICD-10-CM

## 2024-04-16 RX ORDER — LEVOTHYROXINE SODIUM 0.05 MG/1
50 TABLET ORAL DAILY
Qty: 90 TABLET | Refills: 1 | Status: SHIPPED | OUTPATIENT
Start: 2024-04-16

## 2024-04-30 LAB
ALBUMIN SERPL-MCNC: 4.5 G/DL (ref 3.5–5.7)
ALP SERPL-CCNC: 54 U/L (ref 35–120)
ALT SERPL-CCNC: 23 U/L
ANION GAP SERPL CALCULATED.3IONS-SCNC: 10 MMOL/L (ref 3–11)
AST SERPL-CCNC: 14 U/L
BILIRUB SERPL-MCNC: 0.5 MG/DL (ref 0.2–1)
BUN SERPL-MCNC: 21 MG/DL (ref 7–28)
CALCIUM SERPL-MCNC: 9.7 MG/DL (ref 8.5–10.1)
CHLORIDE SERPL-SCNC: 106 MMOL/L (ref 100–109)
CHOLEST SERPL-MCNC: 198 MG/DL
CHOLEST/HDLC SERPL: 5.4 {RATIO}
CO2 SERPL-SCNC: 26 MMOL/L (ref 21–31)
CREAT SERPL-MCNC: 0.88 MG/DL (ref 0.53–1.3)
CYTOLOGY CMNT CVX/VAG CYTO-IMP: NORMAL
GFR/BSA.PRED SERPLBLD CYS-BASED-ARV: 120 ML/MIN/{1.73_M2}
GLUCOSE SERPL-MCNC: 95 MG/DL (ref 65–99)
HDLC SERPL-MCNC: 37 MG/DL (ref 23–92)
LDLC SERPL CALC-MCNC: 126 MG/DL
NONHDLC SERPL-MCNC: 161 MG/DL
POTASSIUM SERPL-SCNC: 4.6 MMOL/L (ref 3.5–5.2)
PROT SERPL-MCNC: 6.5 G/DL (ref 6.3–8.3)
SODIUM SERPL-SCNC: 142 MMOL/L (ref 135–145)
TRIGL SERPL-MCNC: 176 MG/DL
TSH SERPL-ACNC: 1.75 UIU/ML (ref 0.45–5.33)

## 2024-07-16 ENCOUNTER — OFFICE VISIT (OUTPATIENT)
Dept: FAMILY MEDICINE CLINIC | Facility: CLINIC | Age: 29
End: 2024-07-16
Payer: COMMERCIAL

## 2024-07-16 VITALS
DIASTOLIC BLOOD PRESSURE: 80 MMHG | OXYGEN SATURATION: 96 % | SYSTOLIC BLOOD PRESSURE: 128 MMHG | HEART RATE: 86 BPM | WEIGHT: 200 LBS | BODY MASS INDEX: 34.15 KG/M2 | HEIGHT: 64 IN

## 2024-07-16 DIAGNOSIS — E03.9 HYPOTHYROIDISM, UNSPECIFIED TYPE: ICD-10-CM

## 2024-07-16 DIAGNOSIS — Z00.00 ANNUAL PHYSICAL EXAM: Primary | ICD-10-CM

## 2024-07-16 DIAGNOSIS — I42.0 DILATED CARDIOMYOPATHY (HCC): ICD-10-CM

## 2024-07-16 DIAGNOSIS — K21.9 GASTROESOPHAGEAL REFLUX DISEASE WITHOUT ESOPHAGITIS: ICD-10-CM

## 2024-07-16 PROBLEM — E87.6 HYPOKALEMIA: Status: RESOLVED | Noted: 2018-11-03 | Resolved: 2024-07-16

## 2024-07-16 PROBLEM — R10.33 PERIUMBILICAL ABDOMINAL PAIN: Chronic | Status: RESOLVED | Noted: 2018-11-01 | Resolved: 2024-07-16

## 2024-07-16 PROBLEM — R94.31 ABNORMAL EKG: Status: RESOLVED | Noted: 2018-01-06 | Resolved: 2024-07-16

## 2024-07-16 PROBLEM — E83.42 HYPOMAGNESEMIA: Status: RESOLVED | Noted: 2018-11-03 | Resolved: 2024-07-16

## 2024-07-16 PROBLEM — E83.39 HYPOPHOSPHATEMIA: Status: RESOLVED | Noted: 2018-11-03 | Resolved: 2024-07-16

## 2024-07-16 PROBLEM — Z02.5 ENCOUNTER FOR EXAMINATION TO PARTICIPATE IN SPECIAL OLYMPICS: Status: RESOLVED | Noted: 2019-05-23 | Resolved: 2024-07-16

## 2024-07-16 PROCEDURE — 99395 PREV VISIT EST AGE 18-39: CPT | Performed by: FAMILY MEDICINE

## 2024-07-16 PROCEDURE — 99214 OFFICE O/P EST MOD 30 MIN: CPT | Performed by: FAMILY MEDICINE

## 2024-07-16 NOTE — PROGRESS NOTES
"Ambulatory Visit  Name: Lazaro Rogers      : 1995      MRN: [id number]  Encounter Provider: Jaylen Rader MD  Encounter Date: 2024   Encounter department: [facility] [place]    Assessment & Plan   1. Annual physical exam  2. Hypothyroidism, unspecified type  Assessment & Plan:  Controlled.  Continue levothyroxine.  3. Dilated cardiomyopathy (HCC)  Assessment & Plan:  Stable.  Following with cardiology.  Continue metoprolol.  Has upcoming echo scheduled.  4. Gastroesophageal reflux disease without esophagitis  Assessment & Plan:  Stable with the use of daily omeprazole and Carafate 2-3 times weekly as needed.       History of Present Illness     Presents to the office with mom today.  Here for annual exam and follow-up.  Has upcoming appointment scheduled for echocardiogram for his dilated cardiomyopathy.  He follows with cardiology and is on metoprolol.  He denies any symptoms today.  No chest pain or shortness of breath.  No palpitations.  No easy fatigability.  He also has history of hypothyroidism.  On levothyroxine.  Up-to-date on thyroid labs.  Having no issues with levothyroxine.  GERD is relatively stable.  He is on omeprazole daily.  Mom reports he needs Carafate 2-3 times a week sometimes as needed at nighttime for stomach pain.  The stomach pain is generalized.  No other symptoms during that time.  It is relieved by Carafate.        Review of Systems   All other systems reviewed and are negative.      Objective     /80 (BP Location: Left arm, Patient Position: Sitting, Cuff Size: Large)   Pulse 86   Ht 5' 4\" (1.626 m)   Wt 90.7 kg (200 lb)   SpO2 96%   BMI 34.33 kg/m²     Physical Exam  Vitals and nursing note reviewed.   Constitutional:       General: He is not in acute distress.     Appearance: Normal appearance. He is well-developed. He is not ill-appearing, toxic-appearing or diaphoretic.   HENT:      Head: Normocephalic and atraumatic.      Nose: Nose " normal.      Mouth/Throat:      Mouth: Mucous membranes are moist.      Pharynx: Oropharynx is clear. No oropharyngeal exudate or posterior oropharyngeal erythema.   Eyes:      General: No scleral icterus.        Right eye: No discharge.         Left eye: No discharge.      Extraocular Movements: Extraocular movements intact.      Conjunctiva/sclera: Conjunctivae normal.      Pupils: Pupils are equal, round, and reactive to light.   Cardiovascular:      Rate and Rhythm: Normal rate and regular rhythm.      Pulses: Normal pulses.      Heart sounds: Normal heart sounds. No murmur heard.  Pulmonary:      Effort: Pulmonary effort is normal. No respiratory distress.      Breath sounds: Normal breath sounds. No stridor. No rhonchi.   Abdominal:      General: Bowel sounds are normal. There is no distension.      Palpations: Abdomen is soft. There is no mass.      Tenderness: There is no abdominal tenderness. There is no guarding or rebound.      Hernia: No hernia is present.   Musculoskeletal:         General: Normal range of motion.      Cervical back: Normal range of motion and neck supple. No rigidity or tenderness.      Right lower leg: No edema.      Left lower leg: No edema.   Lymphadenopathy:      Cervical: No cervical adenopathy.   Skin:     General: Skin is warm and dry.   Neurological:      Mental Status: He is alert and oriented to person, place, and time. Mental status is at baseline.      Cranial Nerves: No cranial nerve deficit.      Motor: No weakness.      Gait: Gait normal.   Psychiatric:         Mood and Affect: Mood normal.         Behavior: Behavior normal.         Thought Content: Thought content normal.         Judgment: Judgment normal.       Administrative Statements

## 2024-08-07 ENCOUNTER — HOSPITAL ENCOUNTER (OUTPATIENT)
Dept: NON INVASIVE DIAGNOSTICS | Facility: CLINIC | Age: 29
Discharge: HOME | End: 2024-08-07
Payer: COMMERCIAL

## 2024-08-07 VITALS
HEIGHT: 64 IN | DIASTOLIC BLOOD PRESSURE: 80 MMHG | HEART RATE: 76 BPM | SYSTOLIC BLOOD PRESSURE: 128 MMHG | WEIGHT: 200 LBS | BODY MASS INDEX: 34.15 KG/M2

## 2024-08-07 DIAGNOSIS — I42.0 DILATED CARDIOMYOPATHY (HCC): ICD-10-CM

## 2024-08-07 DIAGNOSIS — R00.0 TACHYCARDIA: ICD-10-CM

## 2024-08-07 LAB
AORTIC ROOT: 2.3 CM
AORTIC VALVE MEAN VELOCITY: 7.9 M/S
APICAL FOUR CHAMBER EJECTION FRACTION: 55 %
ASCENDING AORTA: 2.2 CM
AV AREA BY CONTINUOUS VTI: 2.2 CM2
AV AREA PEAK VELOCITY: 2.4 CM2
AV LVOT MEAN GRADIENT: 2 MMHG
AV LVOT PEAK GRADIENT: 4 MMHG
AV MEAN GRADIENT: 3 MMHG
AV PEAK GRADIENT: 7 MMHG
AV VALVE AREA: 2.18 CM2
AV VELOCITY RATIO: 0.76
BSA FOR ECHO PROCEDURE: 1.96 M2
DOP CALC AO PEAK VEL: 1.35 M/S
DOP CALC AO VTI: 27.21 CM
DOP CALC LVOT AREA: 3.14 CM2
DOP CALC LVOT CARDIAC INDEX: 2.17 L/MIN/M2
DOP CALC LVOT CARDIAC OUTPUT: 4.25 L/MIN
DOP CALC LVOT DIAMETER: 2 CM
DOP CALC LVOT PEAK VEL VTI: 18.91 CM
DOP CALC LVOT PEAK VEL: 1.03 M/S
DOP CALC LVOT STROKE INDEX: 30.1 ML/M2
DOP CALC LVOT STROKE VOLUME: 59
E WAVE DECELERATION TIME: 296 MS
E/A RATIO: 1.43
FRACTIONAL SHORTENING: 30 (ref 28–44)
INTERVENTRICULAR SEPTUM IN DIASTOLE (PARASTERNAL SHORT AXIS VIEW): 1 CM
INTERVENTRICULAR SEPTUM: 1 CM (ref 0.6–1.1)
LAAS-AP2: 10.9 CM2
LAAS-AP4: 10.1 CM2
LEFT ATRIUM SIZE: 3.5 CM
LEFT ATRIUM VOLUME (MOD BIPLANE): 25 ML
LEFT ATRIUM VOLUME INDEX (MOD BIPLANE): 12.8 ML/M2
LEFT INTERNAL DIMENSION IN SYSTOLE: 3.1 CM (ref 2.1–4)
LEFT VENTRICULAR INTERNAL DIMENSION IN DIASTOLE: 4.4 CM (ref 3.5–6)
LEFT VENTRICULAR POSTERIOR WALL IN END DIASTOLE: 1 CM
LEFT VENTRICULAR STROKE VOLUME: 52 ML
LVSV (TEICH): 52 ML
MV E'TISSUE VEL-SEP: 11 CM/S
MV PEAK A VEL: 0.54 M/S
MV PEAK E VEL: 77 CM/S
MV STENOSIS PRESSURE HALF TIME: 86 MS
MV VALVE AREA P 1/2 METHOD: 2.6
RA PRESSURE ESTIMATED: 5 MMHG
RIGHT ATRIUM AREA SYSTOLE A4C: 10.2 CM2
RIGHT VENTRICLE ID DIMENSION: 2.8 CM
RV PSP: 24 MMHG
SL CV LEFT ATRIUM LENGTH A2C: 3.5 CM
SL CV LV EF: 50
SL CV PED ECHO LEFT VENTRICLE DIASTOLIC VOLUME (MOD BIPLANE) 2D: 89 ML
SL CV PED ECHO LEFT VENTRICLE SYSTOLIC VOLUME (MOD BIPLANE) 2D: 37 ML
TR MAX PG: 19 MMHG
TR PEAK VELOCITY: 2.2 M/S
TRICUSPID ANNULAR PLANE SYSTOLIC EXCURSION: 2 CM
TRICUSPID VALVE PEAK REGURGITATION VELOCITY: 2.18 M/S

## 2024-08-07 PROCEDURE — 93306 TTE W/DOPPLER COMPLETE: CPT | Performed by: INTERNAL MEDICINE

## 2024-08-07 PROCEDURE — 93306 TTE W/DOPPLER COMPLETE: CPT

## 2024-08-15 DIAGNOSIS — E03.9 HYPOTHYROIDISM, UNSPECIFIED TYPE: ICD-10-CM

## 2024-08-15 RX ORDER — LEVOTHYROXINE SODIUM 50 UG/1
50 TABLET ORAL DAILY
Qty: 90 TABLET | Refills: 1 | Status: SHIPPED | OUTPATIENT
Start: 2024-08-15

## 2024-08-30 DIAGNOSIS — I42.0 DILATED CARDIOMYOPATHY (HCC): ICD-10-CM

## 2024-08-30 DIAGNOSIS — R00.0 TACHYCARDIA: ICD-10-CM

## 2024-08-30 RX ORDER — METOPROLOL SUCCINATE 100 MG/1
100 TABLET, EXTENDED RELEASE ORAL DAILY
Qty: 90 TABLET | Refills: 0 | Status: SHIPPED | OUTPATIENT
Start: 2024-08-30

## 2024-09-05 ENCOUNTER — TELEPHONE (OUTPATIENT)
Age: 29
End: 2024-09-05

## 2024-09-05 NOTE — TELEPHONE ENCOUNTER
Patient's mother Kelsie called about status of mental health physical form she dropped off the second week of August. Patient requesting a call back with an update.   
Spoke with Darlene- Informed her that when he is finished we will upload to [facility] as requested  
Spoke with Dr. Rader- provider is in the process of filling it out.   
Clothing

## 2024-09-18 ENCOUNTER — TELEPHONE (OUTPATIENT)
Age: 29
End: 2024-09-18

## 2024-09-18 NOTE — TELEPHONE ENCOUNTER
Pt's mom called requesting a print out of the appt on 7/16 - Physical.  They need the summary and notes.      Pt verbally consented for me to talk to mom and request.  Did mention they need an updated communication paper.    They are unable to get it off of the mychart.    Please print and she will  9/19 in the morning

## 2024-09-25 DIAGNOSIS — R00.0 TACHYCARDIA: ICD-10-CM

## 2024-09-25 DIAGNOSIS — I42.0 DILATED CARDIOMYOPATHY (HCC): ICD-10-CM

## 2024-09-25 RX ORDER — METOPROLOL SUCCINATE 100 MG/1
100 TABLET, EXTENDED RELEASE ORAL DAILY
Qty: 90 TABLET | Refills: 0 | OUTPATIENT
Start: 2024-09-25

## 2024-09-29 DIAGNOSIS — K29.50 CHRONIC GASTRITIS WITHOUT BLEEDING, UNSPECIFIED GASTRITIS TYPE: ICD-10-CM

## 2024-09-29 DIAGNOSIS — E03.9 HYPOTHYROIDISM, UNSPECIFIED TYPE: ICD-10-CM

## 2024-09-30 RX ORDER — LEVOTHYROXINE SODIUM 50 UG/1
50 TABLET ORAL DAILY
Qty: 90 TABLET | Refills: 0 | OUTPATIENT
Start: 2024-09-30

## 2024-10-01 NOTE — TELEPHONE ENCOUNTER
Covering for pt's PMD  Levothyroxine was refilled 8/15/24 for #90 + 1 RF  I am decreasing Omeprazole dose. Per chart review, pt last saw his GI ([facility]) 3/8/2023 and EGD was planned, but no indication in chart that pt ever had EGD done. At that time, pt was on 20mg omeprazole one QD and could take one 12 hours later PRN breakthrough GERD, per GI notes. The omeprazole was increased to 40mg QD by PMD at 12/18/2023 visit for possible/suspected gastritis. Pt would need to get back in with his GI specialist, especially if he has increased symptomatology on the lower 20mg QD dose.        Levothyroxine 50mcg  TAKE 1 TABLET BY MOUTH EVERY DAY, Starting Thu 8/15/2024, Normal   Dispense: 90 tablet   Refills: 1 ordered   Pharmacy: [facility]/pharmacy #[id number] [address] (Ph: [phone])   Order Details  Ordered on: 08/15/24   Associated Dx: Hypothyroidism, unspecified type   Authorizing provider: Jaylen Rader MD

## 2024-10-11 ENCOUNTER — OFFICE VISIT (OUTPATIENT)
Dept: CARDIOLOGY CLINIC | Facility: CLINIC | Age: 29
End: 2024-10-11
Payer: COMMERCIAL

## 2024-10-11 VITALS
BODY MASS INDEX: 39.09 KG/M2 | WEIGHT: 229 LBS | SYSTOLIC BLOOD PRESSURE: 102 MMHG | HEIGHT: 64 IN | DIASTOLIC BLOOD PRESSURE: 62 MMHG | HEART RATE: 73 BPM

## 2024-10-11 DIAGNOSIS — I45.6 PRE-EXCITATION ATRIOVENTRICULAR CONDUCTION: ICD-10-CM

## 2024-10-11 DIAGNOSIS — R00.0 TACHYCARDIA: Primary | ICD-10-CM

## 2024-10-11 DIAGNOSIS — I42.0 DILATED CARDIOMYOPATHY (HCC): ICD-10-CM

## 2024-10-11 PROCEDURE — 99214 OFFICE O/P EST MOD 30 MIN: CPT | Performed by: INTERNAL MEDICINE

## 2024-10-11 PROCEDURE — 93000 ELECTROCARDIOGRAM COMPLETE: CPT | Performed by: INTERNAL MEDICINE

## 2024-10-11 NOTE — ASSESSMENT & PLAN NOTE
Minimal by prior echo.  Perhaps even related to prior persistent tachycardia.  Heart rate now much better.  Echo reveals essentially normal LV function at this point.

## 2024-10-11 NOTE — PROGRESS NOTES
" Patient ID: Lazaro Rogers is a 29 y.o. male.        Plan:      Assessment & Plan  Tachycardia  . Resolved  Dilated cardiomyopathy (HCC)  Minimal by prior echo.  Perhaps even related to prior persistent tachycardia.  Heart rate now much better.  Echo reveals essentially normal LV function at this point.  Pre-excitation atrioventricular conduction  No associated symptoms.      Follow up Plan/Other summary comments:  Return in about 2 years (around 10/11/2026).    HPI: Patient seen in follow-up today regarding the above.  Since last visit he has felt well.  No chest pain.  Palpitations are rare and only if he misses a dose of beta-blocker.  No syncope or near syncope.      Results for orders placed or performed in visit on 10/11/24   POCT ECG    Impression    SR. Pre-excitation. Otherwise WNL.         Most recent or relevant cardiac/vascular testing:       Holter monitor 8/3/2019:  Heart rate varied between 60 and 138 beats per minute.  TTE 12/12/2018: LVEF 45%.   TTE 07/18/2022:  EF 50%.  TTE 8/7/2024: No change.  Past Surgical History:   Procedure Laterality Date    DENTAL SURGERY      TONSILLECTOMY AND ADENOIDECTOMY      Last assessed 7/8/2014       Lipid Profile: Reviewed      Review of Systems   10  point ROS  was otherwise non pertinent or negative except as per HPI or as below.   Gait:  Normal.        Objective:     /62   Pulse 73   Ht 5' 4\" (1.626 m)   Wt 104 kg (229 lb)   BMI 39.31 kg/m²     PHYSICAL EXAM:    General:  Normal appearance in no distress.  Eyes:  Anicteric.  Oral mucosa:  Moist.  Neck:  No JVD. Carotid upstrokes are brisk without bruits.  No masses.  Chest:  Clear to auscultation.  Cardiac:  No palpable PMI.  Normal S1 and S2.  No murmur gallop or rub.  Abdomen:  Soft and nontender. No palpable organomegaly or aortic enlargement.  Extremities:  No peripheral edema.  Musculoskeletal:  Symmetric.   Vascular:  Femoral pulses are brisk without bruits.  Popliteal pulses are intact " bilaterally.   Pedal pulses are intact.  Neuro:  Grossly symmetric.  Psych:  Alert and oriented x3.      Meds reviewed.    Past Medical History:   Diagnosis Date    Autism     Dilated cardiomyopathy     Mononucleosis     Morbid obesity with BMI of 40.0-44.9, adult ([facility]) 10/5/2016    Schizophrenia ([facility])     Tachycardia 12/10/2018           Social History     Tobacco Use   Smoking Status Never   Smokeless Tobacco Never

## 2024-11-12 DIAGNOSIS — I42.0 DILATED CARDIOMYOPATHY (HCC): ICD-10-CM

## 2024-11-12 DIAGNOSIS — R00.0 TACHYCARDIA: ICD-10-CM

## 2024-11-13 RX ORDER — METOPROLOL SUCCINATE 100 MG/1
100 TABLET, EXTENDED RELEASE ORAL DAILY
Qty: 90 TABLET | Refills: 1 | Status: SHIPPED | OUTPATIENT
Start: 2024-11-13

## 2024-11-22 DIAGNOSIS — K29.50 CHRONIC GASTRITIS WITHOUT BLEEDING, UNSPECIFIED GASTRITIS TYPE: ICD-10-CM

## 2024-11-22 NOTE — TELEPHONE ENCOUNTER
Patient has  appointment   on Tuesday 11/26/24 out of medication needs sent to different pharmacy     Reason for call:   [x] Refill   [] Prior Auth  [x] Other: patient sees DR Rader would like R on prescription if possible     Office:   [x] PCP/Provider - Jaylen Rader  [] Specialty/Provider -     Medication: Omeprazole     Dose/Frequency: 20 mg Daily    Quantity: 90    Pharmacy: [facility] [place]     Does the patient have enough for 3 days?   [] Yes   [x] No - Send as HP to POD

## 2024-11-26 ENCOUNTER — OFFICE VISIT (OUTPATIENT)
Dept: FAMILY MEDICINE CLINIC | Facility: CLINIC | Age: 29
End: 2024-11-26
Payer: COMMERCIAL

## 2024-11-26 VITALS
WEIGHT: 234 LBS | HEART RATE: 79 BPM | BODY MASS INDEX: 39.95 KG/M2 | HEIGHT: 64 IN | SYSTOLIC BLOOD PRESSURE: 112 MMHG | RESPIRATION RATE: 17 BRPM | DIASTOLIC BLOOD PRESSURE: 78 MMHG | OXYGEN SATURATION: 98 % | TEMPERATURE: 96.6 F

## 2024-11-26 DIAGNOSIS — E03.9 HYPOTHYROIDISM, UNSPECIFIED TYPE: Primary | ICD-10-CM

## 2024-11-26 DIAGNOSIS — K29.50 CHRONIC GASTRITIS WITHOUT BLEEDING, UNSPECIFIED GASTRITIS TYPE: ICD-10-CM

## 2024-11-26 DIAGNOSIS — F84.0 AUTISTIC DISORDER, ACTIVE: Chronic | ICD-10-CM

## 2024-11-26 DIAGNOSIS — F32.A DEPRESSION, UNSPECIFIED DEPRESSION TYPE: Chronic | ICD-10-CM

## 2024-11-26 PROCEDURE — 99214 OFFICE O/P EST MOD 30 MIN: CPT | Performed by: FAMILY MEDICINE

## 2024-11-26 RX ORDER — OMEPRAZOLE 40 MG/1
40 CAPSULE, DELAYED RELEASE ORAL DAILY
Qty: 90 CAPSULE | Refills: 0 | Status: SHIPPED | OUTPATIENT
Start: 2024-11-26

## 2024-11-26 NOTE — PROGRESS NOTES
"Name: Lazaro Rogers      : 1995      MRN: [id number]  Encounter Provider: Jaylen Rader MD  Encounter Date: 2024   Encounter department: [facility] [place]  :  Assessment & Plan  Hypothyroidism, unspecified type  Mom reports patient has been feeling a little more tired recently.  Will check new TSH.  Continue levothyroxine 50 mcg daily.  Orders:    TSH, 3rd generation with Free T4 reflex; Future    Chronic gastritis without bleeding, unspecified gastritis type  Controlled.  Continue omeprazole daily.  Refill sent to pharmacy.  Orders:    omeprazole (PriLOSEC) 40 MG capsule; Take 1 capsule (40 mg total) by mouth daily    Autistic disorder, active         Depression, unspecified depression type  Stable.  Follow with psychiatry.  Continue current meds.                History of Present Illness     Patient presents office today for follow-up.  Mom says he is been little more tired recently.  Going to sleep earlier than normal.  No new cardiac or psych med changes.  He has no other symptoms.  He has history of hypothyroidism on levothyroxine.  Levothyroxine dose is chronic and usually well-controlled his thyroid.  He saw cardiology recently and everything was stable and no med changes were conducted.      Review of Systems   All other systems reviewed and are negative.         Objective   /78 (BP Location: Left arm, Patient Position: Sitting, Cuff Size: Large)   Pulse 79   Temp (!) 96.6 °F (35.9 °C) (Tympanic)   Resp 17   Ht 5' 4\" (1.626 m)   Wt 106 kg (234 lb)   SpO2 98%   BMI 40.17 kg/m²      Physical Exam  Vitals and nursing note reviewed.   Constitutional:       General: He is not in acute distress.     Appearance: Normal appearance. He is well-developed. He is not ill-appearing, toxic-appearing or diaphoretic.   HENT:      Head: Normocephalic and atraumatic.      Right Ear: External ear normal.      Left Ear: External ear normal.      Nose: Nose normal.   Eyes:      " General:         Right eye: No discharge.         Left eye: No discharge.      Extraocular Movements: Extraocular movements intact.      Conjunctiva/sclera: Conjunctivae normal.   Cardiovascular:      Rate and Rhythm: Normal rate and regular rhythm.      Pulses: Normal pulses.           Dorsalis pedis pulses are 2+ on the right side and 2+ on the left side.        Posterior tibial pulses are 2+ on the right side and 2+ on the left side.      Heart sounds: Normal heart sounds.   Pulmonary:      Effort: Pulmonary effort is normal.      Breath sounds: Normal breath sounds.   Musculoskeletal:      Cervical back: Normal range of motion and neck supple.   Feet:      Right foot:      Skin integrity: No ulcer, skin breakdown, erythema, warmth, callus or dry skin.      Left foot:      Skin integrity: No ulcer, skin breakdown, erythema, warmth, callus or dry skin.   Skin:     General: Skin is dry.      Capillary Refill: Capillary refill takes less than 2 seconds.   Neurological:      Mental Status: He is alert and oriented to person, place, and time. Mental status is at baseline.   Psychiatric:         Mood and Affect: Mood normal.         Behavior: Behavior normal.         Thought Content: Thought content normal.         Judgment: Judgment normal.

## 2024-11-26 NOTE — ASSESSMENT & PLAN NOTE
Mom reports patient has been feeling a little more tired recently.  Will check new TSH.  Continue levothyroxine 50 mcg daily.  Orders:    TSH, 3rd generation with Free T4 reflex; Future

## 2025-01-23 ENCOUNTER — OFFICE VISIT (OUTPATIENT)
Dept: FAMILY MEDICINE CLINIC | Facility: CLINIC | Age: 30
End: 2025-01-23
Payer: COMMERCIAL

## 2025-01-23 VITALS
HEART RATE: 71 BPM | WEIGHT: 233.6 LBS | OXYGEN SATURATION: 95 % | TEMPERATURE: 96.2 F | SYSTOLIC BLOOD PRESSURE: 120 MMHG | HEIGHT: 64 IN | DIASTOLIC BLOOD PRESSURE: 78 MMHG | BODY MASS INDEX: 39.88 KG/M2

## 2025-01-23 DIAGNOSIS — I42.0 DILATED CARDIOMYOPATHY (HCC): Primary | ICD-10-CM

## 2025-01-23 DIAGNOSIS — E03.9 HYPOTHYROIDISM, UNSPECIFIED TYPE: ICD-10-CM

## 2025-01-23 DIAGNOSIS — F20.81: Chronic | ICD-10-CM

## 2025-01-23 DIAGNOSIS — F84.0 AUTISTIC DISORDER, ACTIVE: Chronic | ICD-10-CM

## 2025-01-23 DIAGNOSIS — G47.33 OSA (OBSTRUCTIVE SLEEP APNEA): ICD-10-CM

## 2025-01-23 PROCEDURE — 99214 OFFICE O/P EST MOD 30 MIN: CPT | Performed by: FAMILY MEDICINE

## 2025-01-24 NOTE — ASSESSMENT & PLAN NOTE
Mom reports patient has been feeling a little more tired recently.  Continue levothyroxine 50 mcg daily.

## 2025-01-24 NOTE — PROGRESS NOTES
"Name: Lazaro Rogers      : 1995      MRN: [id number]  Encounter Provider: Jaylen Rader MD  Encounter Date: 2025   Encounter department: [facility] [place]  :  Assessment & Plan  Dilated cardiomyopathy (HCC)  Stable.  Following with cardiology.         DARREN (obstructive sleep apnea)  Stable.       Hypothyroidism, unspecified type  Mom reports patient has been feeling a little more tired recently.  Continue levothyroxine 50 mcg daily.           Chronic schizophreniform disorder (HCC)  Stable.  Continue current treatment plan managed by psychiatry.       Autistic disorder, active  Stable.  No contraindications to continuing participation in program.  Mom will call the coordinator and see if he needs PPD if so they will return back for nurse visit if not I will fill out paperwork and they can pick it up next week.              History of Present Illness   Patient presents to the office today with mom for physical exam for [facility] program.  He volunteers weekly with the program and needs clearance.  He has been doing this for a few years now and would like to continue.  He enjoys this time and says it makes him feel good and helpful.  He is never had any injuries or issues.  There is no high impact activities.  No heavy lifting.  He feels well and physically able to do so.  Mom also believes it is good for him.  He has cardiomyopathy but denies any chest pain or shortness of breath today.  No leg swelling.  He takes his medication daily.  He also is on hypothyroid medication.      Review of Systems   All other systems reviewed and are negative.      Objective   /78 (BP Location: Left arm, Patient Position: Sitting, Cuff Size: Large)   Pulse 71   Temp (!) 96.2 °F (35.7 °C) (Tympanic)   Ht 5' 4\" (1.626 m)   Wt 106 kg (233 lb 9.6 oz)   SpO2 95%   BMI 40.10 kg/m²      Physical Exam  Vitals and nursing note reviewed.   Constitutional:       General: He is not in acute distress.     " Appearance: Normal appearance. He is well-developed. He is not ill-appearing, toxic-appearing or diaphoretic.   HENT:      Head: Normocephalic and atraumatic.      Right Ear: External ear normal.      Left Ear: External ear normal.      Nose: Nose normal.      Mouth/Throat:      Mouth: Mucous membranes are moist.      Pharynx: Oropharynx is clear. No oropharyngeal exudate or posterior oropharyngeal erythema.   Eyes:      General: No scleral icterus.        Right eye: No discharge.         Left eye: No discharge.      Extraocular Movements: Extraocular movements intact.      Conjunctiva/sclera: Conjunctivae normal.      Pupils: Pupils are equal, round, and reactive to light.   Cardiovascular:      Rate and Rhythm: Normal rate and regular rhythm.      Pulses: Normal pulses.      Heart sounds: Normal heart sounds. No murmur heard.  Pulmonary:      Effort: Pulmonary effort is normal. No respiratory distress.      Breath sounds: Normal breath sounds.   Abdominal:      General: There is no distension.      Palpations: Abdomen is soft. There is no mass.      Tenderness: There is no abdominal tenderness. There is no guarding or rebound.      Hernia: No hernia is present.   Musculoskeletal:         General: Normal range of motion.      Cervical back: Normal range of motion and neck supple. No rigidity or tenderness.      Right lower leg: No edema.      Left lower leg: No edema.   Lymphadenopathy:      Cervical: No cervical adenopathy.   Skin:     General: Skin is warm and dry.   Neurological:      Mental Status: He is alert and oriented to person, place, and time. Mental status is at baseline.      Cranial Nerves: No cranial nerve deficit.      Sensory: No sensory deficit.      Motor: No weakness.      Coordination: Coordination normal.      Gait: Gait normal.      Deep Tendon Reflexes: Reflexes normal.   Psychiatric:         Mood and Affect: Mood normal.         Behavior: Behavior normal.         Thought Content: Thought  content normal.         Judgment: Judgment normal.

## 2025-01-24 NOTE — ASSESSMENT & PLAN NOTE
Stable.  No contraindications to continuing participation in program.  Mom will call the coordinator and see if he needs PPD if so they will return back for nurse visit if not I will fill out paperwork and they can pick it up next week.

## 2025-01-28 ENCOUNTER — CLINICAL SUPPORT (OUTPATIENT)
Dept: FAMILY MEDICINE CLINIC | Facility: CLINIC | Age: 30
End: 2025-01-28
Payer: COMMERCIAL

## 2025-01-28 DIAGNOSIS — Z11.1 ENCOUNTER FOR PPD TEST: Primary | ICD-10-CM

## 2025-01-28 PROCEDURE — 86580 TB INTRADERMAL TEST: CPT

## 2025-01-30 ENCOUNTER — CLINICAL SUPPORT (OUTPATIENT)
Dept: FAMILY MEDICINE CLINIC | Facility: CLINIC | Age: 30
End: 2025-01-30
Payer: COMMERCIAL

## 2025-01-30 DIAGNOSIS — Z11.1 ENCOUNTER FOR PPD TEST: Primary | ICD-10-CM

## 2025-01-30 LAB
INDURATION: 0 MM
TB SKIN TEST: NEGATIVE

## 2025-03-02 DIAGNOSIS — E03.9 HYPOTHYROIDISM, UNSPECIFIED TYPE: ICD-10-CM

## 2025-03-03 RX ORDER — LEVOTHYROXINE SODIUM 50 UG/1
50 TABLET ORAL DAILY
Qty: 90 TABLET | Refills: 1 | Status: SHIPPED | OUTPATIENT
Start: 2025-03-03

## 2025-03-12 DIAGNOSIS — K29.50 CHRONIC GASTRITIS WITHOUT BLEEDING, UNSPECIFIED GASTRITIS TYPE: ICD-10-CM

## 2025-03-12 NOTE — TELEPHONE ENCOUNTER
Reason for call:   [x] Refill   [] Prior Auth  [] Other:     Office:   [x] PCP/Provider -   [] Specialty/Provider -     Medication:   - Omeprazole 40mg- take 1 capsule by mouth daily      Pharmacy: [facility] [place]    Local Pharmacy   Does the patient have enough for 3 days?   [x] Yes   [] No - Send as HP to POD    Mail Away Pharmacy   Does the patient have enough for 10 days?   [] Yes   [] No - Send as HP to POD

## 2025-03-13 RX ORDER — OMEPRAZOLE 40 MG/1
40 CAPSULE, DELAYED RELEASE ORAL DAILY
Qty: 90 CAPSULE | Refills: 0 | Status: SHIPPED | OUTPATIENT
Start: 2025-03-13

## 2025-03-27 ENCOUNTER — OFFICE VISIT (OUTPATIENT)
Dept: FAMILY MEDICINE CLINIC | Facility: CLINIC | Age: 30
End: 2025-03-27
Payer: COMMERCIAL

## 2025-03-27 VITALS
TEMPERATURE: 97.1 F | BODY MASS INDEX: 40.74 KG/M2 | HEART RATE: 79 BPM | DIASTOLIC BLOOD PRESSURE: 78 MMHG | RESPIRATION RATE: 18 BRPM | HEIGHT: 64 IN | OXYGEN SATURATION: 96 % | WEIGHT: 238.6 LBS | SYSTOLIC BLOOD PRESSURE: 116 MMHG

## 2025-03-27 DIAGNOSIS — G47.33 OSA (OBSTRUCTIVE SLEEP APNEA): ICD-10-CM

## 2025-03-27 DIAGNOSIS — D22.9 BENIGN MOLE: ICD-10-CM

## 2025-03-27 DIAGNOSIS — M79.671 BILATERAL FOOT PAIN: ICD-10-CM

## 2025-03-27 DIAGNOSIS — E78.1 ESSENTIAL HYPERTRIGLYCERIDEMIA: ICD-10-CM

## 2025-03-27 DIAGNOSIS — E78.2 MIXED HYPERLIPIDEMIA: Primary | Chronic | ICD-10-CM

## 2025-03-27 DIAGNOSIS — E03.9 HYPOTHYROIDISM, UNSPECIFIED TYPE: ICD-10-CM

## 2025-03-27 DIAGNOSIS — M79.672 BILATERAL FOOT PAIN: ICD-10-CM

## 2025-03-27 PROCEDURE — 99214 OFFICE O/P EST MOD 30 MIN: CPT | Performed by: FAMILY MEDICINE

## 2025-03-27 NOTE — PROGRESS NOTES
Name: Lazaro Rogers      : 1995      MRN: [id number]  Encounter Provider: Jaylen Rader MD  Encounter Date: 3/27/2025   Encounter department: [facility] [place]  :  Assessment & Plan  Mixed hyperlipidemia    Orders:    Lipid panel; Future    Comprehensive metabolic panel; Future    TSH, 3rd generation with Free T4 reflex; Future    Essential hypertriglyceridemia  Controlled.  Continue current regiment.  Orders:    Lipid panel; Future    Comprehensive metabolic panel; Future    TSH, 3rd generation with Free T4 reflex; Future    ADRREN (obstructive sleep apnea)  Stable. CPAP         Hypothyroidism, unspecified type  Continue levothyroxine 50 mcg daily.  Get new labs.      Orders:    Lipid panel; Future    TSH, 3rd generation with Free T4 reflex; Future      Benign mole  Patient has benign mole on right cheek.  Less than the 10th of an inch.  It circular and slightly raised.  Uniform color.  Smooth borders.  Recommended he continue to monitor.       Bilateral foot pain  Physical exam unremarkable.  Intermittent mild foot pain responds well to OTC treatment.  I recommend continue to monitor for worsening symptoms and continue OCT treatment.  Follow-up as needed.              History of Present Illness   Presents office to follow-up on chronic conditions.  Has history of hypothyroidism hypertension.  He has concerns for mole on his cheek.'s been there for a while.  Has not changed in size.  Or color or shape.  He was googling online and said it could be cancer.  I does not painful.  No family history of skin cancer.  He also has foot pain.  Foot pain is random and intermittent.  Sometimes it lasts a few minutes to a whole day.  I responds well to OTC treatment when he takes it.  It is on both of the feet in different parts every time.  Nothing consistently.  No amatory dysfunction.  He denied any trauma.  No previous history of leg or foot surgery.  There is no swelling or redness to the foot  "when it hurts.      Review of Systems   All other systems reviewed and are negative.      Objective   /78 (BP Location: Left arm, Patient Position: Sitting, Cuff Size: Large)   Pulse 79   Temp (!) 97.1 °F (36.2 °C) (Tympanic)   Resp 18   Ht 5' 4\" (1.626 m)   Wt 108 kg (238 lb 9.6 oz)   SpO2 96%   BMI 40.96 kg/m²      Physical Exam  Vitals and nursing note reviewed.   Constitutional:       General: He is not in acute distress.     Appearance: Normal appearance. He is well-developed. He is not ill-appearing, toxic-appearing or diaphoretic.   HENT:      Head: Normocephalic and atraumatic.   Eyes:      General:         Right eye: No discharge.         Left eye: No discharge.      Extraocular Movements: Extraocular movements intact.      Conjunctiva/sclera: Conjunctivae normal.   Cardiovascular:      Rate and Rhythm: Normal rate and regular rhythm.      Pulses: Normal pulses.           Dorsalis pedis pulses are 2+ on the right side and 2+ on the left side.        Posterior tibial pulses are 2+ on the right side and 2+ on the left side.      Heart sounds: Normal heart sounds.   Pulmonary:      Effort: Pulmonary effort is normal.      Breath sounds: Normal breath sounds.   Musculoskeletal:         General: No swelling, tenderness, deformity or signs of injury. Normal range of motion.      Cervical back: Normal range of motion and neck supple.      Right lower leg: No edema.      Left lower leg: No edema.   Feet:      Right foot:      Skin integrity: No ulcer, skin breakdown, erythema, warmth, callus or dry skin.      Left foot:      Skin integrity: No ulcer, skin breakdown, erythema, warmth, callus or dry skin.   Skin:     General: Skin is dry.      Capillary Refill: Capillary refill takes less than 2 seconds.   Neurological:      Mental Status: He is alert and oriented to person, place, and time.   Psychiatric:         Mood and Affect: Mood normal.         Behavior: Behavior normal.         Thought Content: " Thought content normal.         Judgment: Judgment normal.

## 2025-03-27 NOTE — ASSESSMENT & PLAN NOTE
Orders:    Lipid panel; Future    Comprehensive metabolic panel; Future    TSH, 3rd generation with Free T4 reflex; Future

## 2025-03-27 NOTE — ASSESSMENT & PLAN NOTE
Controlled.  Continue current regiment.  Orders:    Lipid panel; Future    Comprehensive metabolic panel; Future    TSH, 3rd generation with Free T4 reflex; Future

## 2025-03-27 NOTE — ASSESSMENT & PLAN NOTE
Continue levothyroxine 50 mcg daily.  Get new labs.      Orders:    Lipid panel; Future    TSH, 3rd generation with Free T4 reflex; Future

## 2025-05-28 DIAGNOSIS — I42.0 DILATED CARDIOMYOPATHY (HCC): ICD-10-CM

## 2025-05-28 DIAGNOSIS — R00.0 TACHYCARDIA: ICD-10-CM

## 2025-05-28 RX ORDER — METOPROLOL SUCCINATE 100 MG/1
100 TABLET, EXTENDED RELEASE ORAL DAILY
Qty: 90 TABLET | Refills: 1 | Status: SHIPPED | OUTPATIENT
Start: 2025-05-28

## 2025-06-02 DIAGNOSIS — K29.50 CHRONIC GASTRITIS WITHOUT BLEEDING, UNSPECIFIED GASTRITIS TYPE: ICD-10-CM

## 2025-06-02 NOTE — TELEPHONE ENCOUNTER
Reason for call:   [x] Refill   [] Prior Auth  [] Other:     Office:   [x] PCP/Provider - : Jaylen [facility] AT [place]   [] Specialty/Provider -     Medication: Prilosec    Dose/Frequency: 40 mg capsule    Quantity: #90    Pharmacy: [address] [facility]   Does the patient have enough for 3 days?   [x] Yes   [] No - Send as HP to POD    Mail Away Pharmacy   Does the patient have enough for 10 days?   [] Yes   [] No - Send as HP to POD

## 2025-06-03 RX ORDER — OMEPRAZOLE 40 MG/1
40 CAPSULE, DELAYED RELEASE ORAL DAILY
Qty: 90 CAPSULE | Refills: 1 | Status: SHIPPED | OUTPATIENT
Start: 2025-06-03

## 2025-06-27 ENCOUNTER — TELEPHONE (OUTPATIENT)
Age: 30
End: 2025-06-27

## 2025-06-30 NOTE — TELEPHONE ENCOUNTER
Letter written.  he can get this from [employer] or come  in office at his convenience.

## 2025-07-17 ENCOUNTER — OFFICE VISIT (OUTPATIENT)
Dept: FAMILY MEDICINE CLINIC | Facility: CLINIC | Age: 30
End: 2025-07-17
Payer: COMMERCIAL

## 2025-07-17 VITALS
DIASTOLIC BLOOD PRESSURE: 72 MMHG | WEIGHT: 244 LBS | SYSTOLIC BLOOD PRESSURE: 116 MMHG | TEMPERATURE: 97.4 F | BODY MASS INDEX: 41.66 KG/M2 | OXYGEN SATURATION: 100 % | HEART RATE: 80 BPM | HEIGHT: 64 IN

## 2025-07-17 DIAGNOSIS — K21.9 GASTROESOPHAGEAL REFLUX DISEASE WITHOUT ESOPHAGITIS: ICD-10-CM

## 2025-07-17 DIAGNOSIS — E03.9 HYPOTHYROIDISM, UNSPECIFIED TYPE: ICD-10-CM

## 2025-07-17 DIAGNOSIS — I42.0 DILATED CARDIOMYOPATHY (HCC): Primary | ICD-10-CM

## 2025-07-17 PROCEDURE — 99214 OFFICE O/P EST MOD 30 MIN: CPT | Performed by: FAMILY MEDICINE

## 2025-07-17 NOTE — PROGRESS NOTES
"Name: Lazaro Rogers      : 1995      MRN: [id number]  Encounter Provider: Jaylen Rader MD  Encounter Date: 2025   Encounter department: [facility] [place]    :  Assessment & Plan  Dilated cardiomyopathy (HCC)  Stable.  Following with cardiology.           Gastroesophageal reflux disease without esophagitis  Controlled. Contiue omeprazole . Used it only once last month .        Hypothyroidism, unspecified type  Continue levothyroxine 50 mcg daily.  Get new labs.                 Assessment & Plan             History of Present Illness     History of Present Illness  Here for follow up. No concerns. Didn't get lab work. Here with mom. Foot pain went away.  Abdominal pain from reflux is well-controlled.  He took PPI once.  Still follow with cardiology.  No concerns.     Review of Systems   All other systems reviewed and are negative.    Objective   /72   Pulse 80   Temp (!) 97.4 °F (36.3 °C) (Tympanic)   Ht 5' 4\" (1.626 m)   Wt 111 kg (244 lb)   SpO2 100%   BMI 41.88 kg/m²     Physical Exam    Physical Exam  Vitals and nursing note reviewed.   Constitutional:       General: He is not in acute distress.     Appearance: Normal appearance. He is well-developed. He is not ill-appearing, toxic-appearing or diaphoretic.   HENT:      Head: Normocephalic and atraumatic.     Eyes:      General:         Right eye: No discharge.         Left eye: No discharge.      Extraocular Movements: Extraocular movements intact.      Conjunctiva/sclera: Conjunctivae normal.       Cardiovascular:      Rate and Rhythm: Normal rate.      Pulses:           Dorsalis pedis pulses are 2+ on the right side and 2+ on the left side.        Posterior tibial pulses are 2+ on the right side and 2+ on the left side.   Pulmonary:      Effort: Pulmonary effort is normal.     Musculoskeletal:      Cervical back: Normal range of motion and neck supple.   Feet:      Right foot:      Skin integrity: No ulcer, skin " breakdown, erythema, warmth, callus or dry skin.      Left foot:      Skin integrity: No ulcer, skin breakdown, erythema, warmth, callus or dry skin.     Skin:     General: Skin is dry.      Capillary Refill: Capillary refill takes less than 2 seconds.     Neurological:      Mental Status: He is alert and oriented to person, place, and time. Mental status is at baseline.     Psychiatric:         Mood and Affect: Mood normal.         Behavior: Behavior normal.         Thought Content: Thought content normal.         Judgment: Judgment normal.